# Patient Record
Sex: MALE | Race: BLACK OR AFRICAN AMERICAN | NOT HISPANIC OR LATINO | Employment: UNEMPLOYED | URBAN - METROPOLITAN AREA
[De-identification: names, ages, dates, MRNs, and addresses within clinical notes are randomized per-mention and may not be internally consistent; named-entity substitution may affect disease eponyms.]

---

## 2024-05-11 ENCOUNTER — APPOINTMENT (EMERGENCY)
Dept: CT IMAGING | Facility: HOSPITAL | Age: 49
DRG: 045 | End: 2024-05-11
Payer: COMMERCIAL

## 2024-05-11 ENCOUNTER — APPOINTMENT (EMERGENCY)
Dept: RADIOLOGY | Facility: HOSPITAL | Age: 49
DRG: 045 | End: 2024-05-11
Payer: COMMERCIAL

## 2024-05-11 ENCOUNTER — HOSPITAL ENCOUNTER (INPATIENT)
Facility: HOSPITAL | Age: 49
LOS: 3 days | Discharge: HOME | DRG: 045 | End: 2024-05-14
Attending: EMERGENCY MEDICINE | Admitting: HOSPITALIST
Payer: COMMERCIAL

## 2024-05-11 DIAGNOSIS — I63.9 CEREBROVASCULAR ACCIDENT (CVA), UNSPECIFIED MECHANISM (HCC): Primary | ICD-10-CM

## 2024-05-11 DIAGNOSIS — R29.90 STROKE-LIKE SYMPTOMS: ICD-10-CM

## 2024-05-11 DIAGNOSIS — I63.81 CEREBROVASCULAR ACCIDENT (CVA) OF LEFT BASAL GANGLIA (HCC): ICD-10-CM

## 2024-05-11 PROBLEM — I10 HYPERTENSION: Status: ACTIVE | Noted: 2024-05-11

## 2024-05-11 PROBLEM — E87.6 HYPOKALEMIA: Status: ACTIVE | Noted: 2024-05-11

## 2024-05-11 PROBLEM — K59.00 CONSTIPATION: Status: ACTIVE | Noted: 2024-05-11

## 2024-05-11 LAB
ALBUMIN SERPL BCP-MCNC: 4 G/DL (ref 3.5–5)
ALP SERPL-CCNC: 71 U/L (ref 34–104)
ALT SERPL W P-5'-P-CCNC: 23 U/L (ref 7–52)
ANION GAP SERPL CALCULATED.3IONS-SCNC: 6 MMOL/L (ref 4–13)
AST SERPL W P-5'-P-CCNC: 19 U/L (ref 13–39)
BASOPHILS # BLD AUTO: 0.02 THOUSANDS/ÂΜL (ref 0–0.1)
BASOPHILS NFR BLD AUTO: 0 % (ref 0–1)
BILIRUB SERPL-MCNC: 0.71 MG/DL (ref 0.2–1)
BUN SERPL-MCNC: 15 MG/DL (ref 5–25)
CALCIUM SERPL-MCNC: 8.9 MG/DL (ref 8.4–10.2)
CARDIAC TROPONIN I PNL SERPL HS: <2 NG/L
CHLORIDE SERPL-SCNC: 103 MMOL/L (ref 96–108)
CO2 SERPL-SCNC: 27 MMOL/L (ref 21–32)
CREAT SERPL-MCNC: 0.86 MG/DL (ref 0.6–1.3)
EOSINOPHIL # BLD AUTO: 0.16 THOUSAND/ÂΜL (ref 0–0.61)
EOSINOPHIL NFR BLD AUTO: 3 % (ref 0–6)
ERYTHROCYTE [DISTWIDTH] IN BLOOD BY AUTOMATED COUNT: 12.3 % (ref 11.6–15.1)
GFR SERPL CREATININE-BSD FRML MDRD: 102 ML/MIN/1.73SQ M
GLUCOSE SERPL-MCNC: 99 MG/DL (ref 65–140)
HCT VFR BLD AUTO: 38.9 % (ref 36.5–49.3)
HGB BLD-MCNC: 13.2 G/DL (ref 12–17)
IMM GRANULOCYTES # BLD AUTO: 0.02 THOUSAND/UL (ref 0–0.2)
IMM GRANULOCYTES NFR BLD AUTO: 0 % (ref 0–2)
LIPASE SERPL-CCNC: 28 U/L (ref 11–82)
LYMPHOCYTES # BLD AUTO: 1.57 THOUSANDS/ÂΜL (ref 0.6–4.47)
LYMPHOCYTES NFR BLD AUTO: 25 % (ref 14–44)
MAGNESIUM SERPL-MCNC: 1.9 MG/DL (ref 1.9–2.7)
MCH RBC QN AUTO: 29.7 PG (ref 26.8–34.3)
MCHC RBC AUTO-ENTMCNC: 33.9 G/DL (ref 31.4–37.4)
MCV RBC AUTO: 87 FL (ref 82–98)
MONOCYTES # BLD AUTO: 0.59 THOUSAND/ÂΜL (ref 0.17–1.22)
MONOCYTES NFR BLD AUTO: 9 % (ref 4–12)
NEUTROPHILS # BLD AUTO: 3.95 THOUSANDS/ÂΜL (ref 1.85–7.62)
NEUTS SEG NFR BLD AUTO: 63 % (ref 43–75)
NRBC BLD AUTO-RTO: 0 /100 WBCS
PLATELET # BLD AUTO: 206 THOUSANDS/UL (ref 149–390)
PMV BLD AUTO: 9.2 FL (ref 8.9–12.7)
POTASSIUM SERPL-SCNC: 3.4 MMOL/L (ref 3.5–5.3)
PROT SERPL-MCNC: 6.7 G/DL (ref 6.4–8.4)
RBC # BLD AUTO: 4.45 MILLION/UL (ref 3.88–5.62)
SODIUM SERPL-SCNC: 136 MMOL/L (ref 135–147)
TSH SERPL DL<=0.05 MIU/L-ACNC: 1.4 UIU/ML (ref 0.45–4.5)
WBC # BLD AUTO: 6.31 THOUSAND/UL (ref 4.31–10.16)

## 2024-05-11 PROCEDURE — 36415 COLL VENOUS BLD VENIPUNCTURE: CPT

## 2024-05-11 PROCEDURE — 80053 COMPREHEN METABOLIC PANEL: CPT

## 2024-05-11 PROCEDURE — 83690 ASSAY OF LIPASE: CPT

## 2024-05-11 PROCEDURE — 99222 1ST HOSP IP/OBS MODERATE 55: CPT | Performed by: HOSPITALIST

## 2024-05-11 PROCEDURE — 83735 ASSAY OF MAGNESIUM: CPT

## 2024-05-11 PROCEDURE — 99285 EMERGENCY DEPT VISIT HI MDM: CPT

## 2024-05-11 PROCEDURE — 84484 ASSAY OF TROPONIN QUANT: CPT

## 2024-05-11 PROCEDURE — 99285 EMERGENCY DEPT VISIT HI MDM: CPT | Performed by: EMERGENCY MEDICINE

## 2024-05-11 PROCEDURE — 93005 ELECTROCARDIOGRAM TRACING: CPT

## 2024-05-11 PROCEDURE — 84443 ASSAY THYROID STIM HORMONE: CPT

## 2024-05-11 PROCEDURE — 85025 COMPLETE CBC W/AUTO DIFF WBC: CPT

## 2024-05-11 PROCEDURE — 71045 X-RAY EXAM CHEST 1 VIEW: CPT

## 2024-05-11 PROCEDURE — 70450 CT HEAD/BRAIN W/O DYE: CPT

## 2024-05-11 RX ORDER — ONDANSETRON 2 MG/ML
4 INJECTION INTRAMUSCULAR; INTRAVENOUS EVERY 6 HOURS PRN
Status: DISCONTINUED | OUTPATIENT
Start: 2024-05-11 | End: 2024-05-14 | Stop reason: HOSPADM

## 2024-05-11 RX ORDER — POLYETHYLENE GLYCOL 3350 17 G/17G
17 POWDER, FOR SOLUTION ORAL DAILY PRN
Status: DISCONTINUED | OUTPATIENT
Start: 2024-05-11 | End: 2024-05-14 | Stop reason: HOSPADM

## 2024-05-11 RX ORDER — ENOXAPARIN SODIUM 100 MG/ML
40 INJECTION SUBCUTANEOUS 2 TIMES DAILY
Status: DISCONTINUED | OUTPATIENT
Start: 2024-05-11 | End: 2024-05-11

## 2024-05-11 RX ORDER — ASPIRIN 325 MG
325 TABLET ORAL DAILY
Status: DISCONTINUED | OUTPATIENT
Start: 2024-05-11 | End: 2024-05-11

## 2024-05-11 RX ORDER — HYDRALAZINE HYDROCHLORIDE 20 MG/ML
10 INJECTION INTRAMUSCULAR; INTRAVENOUS EVERY 6 HOURS PRN
Status: DISCONTINUED | OUTPATIENT
Start: 2024-05-11 | End: 2024-05-14 | Stop reason: HOSPADM

## 2024-05-11 RX ORDER — SENNOSIDES 8.6 MG
1 TABLET ORAL DAILY
Status: DISCONTINUED | OUTPATIENT
Start: 2024-05-12 | End: 2024-05-14 | Stop reason: HOSPADM

## 2024-05-11 RX ORDER — ASPIRIN 325 MG
325 TABLET ORAL ONCE
Status: COMPLETED | OUTPATIENT
Start: 2024-05-11 | End: 2024-05-11

## 2024-05-11 RX ORDER — ASPIRIN 81 MG/1
81 TABLET, CHEWABLE ORAL DAILY
Status: DISCONTINUED | OUTPATIENT
Start: 2024-05-12 | End: 2024-05-12

## 2024-05-11 RX ORDER — ACETAMINOPHEN 325 MG/1
650 TABLET ORAL EVERY 6 HOURS PRN
Status: DISCONTINUED | OUTPATIENT
Start: 2024-05-11 | End: 2024-05-14 | Stop reason: HOSPADM

## 2024-05-11 RX ORDER — ATORVASTATIN CALCIUM 40 MG/1
40 TABLET, FILM COATED ORAL EVERY EVENING
Status: DISCONTINUED | OUTPATIENT
Start: 2024-05-11 | End: 2024-05-12

## 2024-05-11 RX ORDER — POTASSIUM CHLORIDE 20 MEQ/1
40 TABLET, EXTENDED RELEASE ORAL ONCE
Status: COMPLETED | OUTPATIENT
Start: 2024-05-11 | End: 2024-05-11

## 2024-05-11 RX ORDER — ENOXAPARIN SODIUM 100 MG/ML
40 INJECTION SUBCUTANEOUS
Status: DISCONTINUED | OUTPATIENT
Start: 2024-05-12 | End: 2024-05-14 | Stop reason: HOSPADM

## 2024-05-11 RX ADMIN — ASPIRIN 325 MG ORAL TABLET 325 MG: 325 PILL ORAL at 19:30

## 2024-05-11 RX ADMIN — POTASSIUM CHLORIDE 40 MEQ: 1500 TABLET, EXTENDED RELEASE ORAL at 19:30

## 2024-05-11 RX ADMIN — ATORVASTATIN CALCIUM 40 MG: 40 TABLET, FILM COATED ORAL at 19:30

## 2024-05-11 NOTE — ASSESSMENT & PLAN NOTE
Presented with 1 week of persistent hiccups.   Expressive aphasia and impaired finger-to-nose noted on admission exam  CT head: Hypodensity in the left basal ganglia concerning for subacute ischemia  No TNK or stroke alert in the ED due to symptoms being present for 1 week  At baseline patient is highly educated (Masters degree) and has no difficulty expressing himself fluently in English  The reflex arc that causes hiccups is thought to involve the basal ganglia - the patient's presenting symptoms are consistent with basal ganglia stroke   TSH within normal limits  Risk factor for CVA: Hypertension  Blood Pressure: 124/80    Plan:  Follow-up MRI brain  Follow up TTE  Monitor on tele  Follow up Lipid Panel and HbA1c  Start Atorvastatin 40 mg daily  Aspirin 325 mg now, followed by Aspirin 81 mg daily  Nursing dysphagia screen prior to starting diet  PT/OT/Speech eval  Neuro checks per protocol  Neurology consulted, appreciate recommendations

## 2024-05-11 NOTE — H&P
[facility]  H&P  Name: Galdino Gaines 48 y.o. male I MRN: [id number]  Unit/Bed#: S MS 311Alex I Date of Admission: 5/11/2024   Date of Service: 5/11/2024 I Hospital Day: 0      Assessment/Plan   * Cerebrovascular accident (CVA) (HCC)  Assessment & Plan  Presented with 1 week of persistent hiccups.   Expressive aphasia and impaired finger-to-nose noted on admission exam  CT head: Hypodensity in the left basal ganglia concerning for subacute ischemia  No TNK or stroke alert in the ED due to symptoms being present for 1 week  At baseline patient is highly educated (Masters degree) and has no difficulty expressing himself fluently in English  The reflex arc that causes hiccups is thought to involve the basal ganglia - the patient's presenting symptoms are consistent with basal ganglia stroke   TSH within normal limits  Risk factor for CVA: Hypertension  Blood Pressure: 139/82    Plan:  Follow-up MRI brain  Follow up TTE  Monitor on tele  Follow up Lipid Panel and HbA1c  Start Atorvastatin 40 mg daily  Aspirin 325 mg now, followed by Aspirin 81 mg daily  Nursing dysphagia screen prior to starting diet  PT/OT/Speech eval  Neuro checks per protocol  Neurology consulted, appreciate recommendations    Hypertension  Assessment & Plan  Blood Pressure: 139/82  Patient reports history of hypertension, is on 1 medication at home but does not recall the name.  He does not recall the name of his primary physician.  He states that it is a private physician, not with [facility] or [facility].  Blood pressure currently well-controlled  The patient's stroke symptoms have been going on for 1 week. Do not need to allow for permissive HTN.     Plan:  Monitor blood pressure.   Avoid hypotension in the setting of CVA  The patient's sister will look up his home med  PRN Hydralazine for SBP >160    Constipation  Assessment & Plan  Patient reports last BM 2-3 days ago    Plan:  Senna 8.6mg daily  Miralax daily  PRN    Hypokalemia  Assessment & Plan  Recent Labs     05/11/24  1402   K 3.4*   MG 1.9       Mild hypokalemia present on admission  Patient endorses poor po intake due to decreased appetite x 1 week    Plan:  Check Mg  Monitor BMP   K/Mg repletion as appropriate         VTE Pharmacologic Prophylaxis: VTE Score: 7 High Risk (Score >/= 5) - Pharmacological DVT Prophylaxis Ordered: enoxaparin (Lovenox). Sequential Compression Devices Ordered.  Code Status: Level 1 - Full Code   Discussion with family: Updated  (sister) via phone.    Anticipated Length of Stay: Patient will be admitted on an inpatient basis with an anticipated length of stay of greater than 2 midnights secondary to CVA.    Chief Complaint: Persistent Hiccups x 1 week    History of Present Illness:  History was obtained from the patient and his sister, Meli (who he lives) with as collateral.     Galdino Gaines is a 48 y.o. male with a PMH of hypertension who presents with persistent hiccups x 1 week.  The patient also notes decreased appetite for the past week.  He has not felt hungry, and has not had a desire to eat.  He denies recent illness, recent travel nausea, vomiting, or abdominal pain.  He has a history of hypertension for which he takes 1 medication.  He does not remember the name of the medication.  He denies noticing any difficulty expressing himself, or word-finding difficulty.     Per the patient's sister Meli, the patient has been having hiccups for the past week. He has also been more quiet than usual this week, and his appetite has been decreased. He has not complained of headaches, vision changes, dizziness, weakness, gait disturbance, chest pain, or palpations. She notes that he had difficulty expressing himself while trying to answer questions in triage today. This is very unusual for him, because he is highly educated, has a Master's in Accounting, and all of his higher education has been in English so he speaks  fluently at baseline. She has not noticed any difficulty with coordination or balance.  The patient does not have any history of blood clots, hypercoagulability, previous strokes, autoimmune disease, or arrhythmias.       Review of Systems:  Review of Systems   Constitutional:  Positive for appetite change (decreased). Negative for chills, fatigue and fever.   HENT:  Negative for rhinorrhea, sneezing and sore throat.    Eyes:  Negative for visual disturbance.   Respiratory:  Negative for cough, choking and shortness of breath.    Cardiovascular:  Negative for chest pain, palpitations and leg swelling.   Gastrointestinal:  Positive for constipation (last BM 2-3 days ago). Negative for abdominal pain, diarrhea, nausea and vomiting.   Genitourinary:  Negative for difficulty urinating, dysuria and frequency.   Musculoskeletal:  Negative for gait problem and neck stiffness.   Skin:  Negative for rash.   Allergic/Immunologic: Negative for food allergies.   Neurological:  Negative for dizziness, syncope, facial asymmetry, speech difficulty, weakness, numbness and headaches.   Psychiatric/Behavioral:  Negative for confusion, decreased concentration and sleep disturbance.    All other systems reviewed and are negative.      Past Medical and Surgical History:   Past Medical History:   Diagnosis Date    Hypertension        No past surgical history on file.    Meds/Allergies:  Prior to Admission medications    Not on File     The patient reports taking 1 medication for hypertension.  Neither the patient nor his Sister Meli recall the name of the medication. The patient's sister will find out the name of the medication.     Supplements:  The patient does not take any supplements or vitamins. No testosterone supplementation.     Allergies: No Known Allergies    Social History:  Marital Status: Single   Occupation: Has a Master's Degree level of education in Accounting   Patient Pre-hospital Living Situation: With other family  "member: Sister, Meli  Patient Pre-hospital Level of Mobility: walks  Patient Pre-hospital Diet Restrictions: None  Substance Use History: Denies tobacco, alcohol, or other substance use   Social History     Substance and Sexual Activity   Alcohol Use Not on file     Social History     Tobacco Use   Smoking Status Not on file   Smokeless Tobacco Not on file     Social History     Substance and Sexual Activity   Drug Use Not on file       Family History:  Patient reports family history of CVA, unsure of age (father)    Physical Exam:     Vitals:   Blood Pressure: 139/82 (05/11/24 1915)  Pulse: 59 (05/11/24 1915)  Temperature: 98.6 °F (37 °C) (05/11/24 1915)  Temp Source: Oral (05/11/24 1731)  Respirations: 18 (05/11/24 1915)  Height: 5' 7\" (170.2 cm) (05/11/24 1731)  Weight - Scale: 73.8 kg (162 lb 11.2 oz) (05/11/24 1813)  SpO2: 99 % (05/11/24 1915)    Physical Exam  Vitals reviewed.   Constitutional:       General: He is not in acute distress.     Appearance: Normal appearance. He is not ill-appearing.   HENT:      Head: Normocephalic and atraumatic.      Right Ear: External ear normal.      Left Ear: External ear normal.      Nose: Nose normal.      Mouth/Throat:      Mouth: Mucous membranes are moist.      Pharynx: Oropharynx is clear. No oropharyngeal exudate.   Eyes:      General: No visual field deficit or scleral icterus.     Extraocular Movements: Extraocular movements intact.      Pupils: Pupils are equal, round, and reactive to light.   Cardiovascular:      Rate and Rhythm: Normal rate and regular rhythm.      Pulses: Normal pulses.      Heart sounds: Normal heart sounds.   Pulmonary:      Effort: Pulmonary effort is normal.      Breath sounds: Normal breath sounds.   Abdominal:      General: Abdomen is flat. Bowel sounds are normal. There is no distension.      Palpations: Abdomen is soft.      Tenderness: There is no abdominal tenderness.   Musculoskeletal:      Cervical back: Neck supple.      Right lower " leg: No edema.      Left lower leg: No edema.   Skin:     General: Skin is warm and dry.   Neurological:      Mental Status: He is alert and oriented to person, place, and time.      Cranial Nerves: Cranial nerves 2-12 are intact. No dysarthria or facial asymmetry.      Sensory: Sensation is intact.      Motor: No weakness, tremor or atrophy.      Coordination: Finger-Nose-Finger Test abnormal (decreased accuracy bilaterally, right worse than left). Heel to Shin Test normal. Impaired rapid alternating movements (slow, bilaterally).      Deep Tendon Reflexes: Reflexes normal. Babinski sign absent on the right side. Babinski sign absent on the left side.      Reflex Scores:       Bicep reflexes are 2+ on the right side.       Brachioradialis reflexes are 2+ on the left side.       Patellar reflexes are 2+ on the right side and 2+ on the left side.     Comments: Uncontrolled hiccupping and yawning throughout exam    Expressive aphasia noted. The patient takes much longer than expected to answer questions, and answers in short, simple sentences. He does not appear to be frustrated with his limited ability to communicate.  Once he does answer, the answers are accurate.  He does not appear to be confused.   Psychiatric:         Mood and Affect: Affect is flat.         Speech: Speech is delayed.         Behavior: Behavior is slowed.        Additional Data:     Lab Results:  Results from last 7 days   Lab Units 05/11/24  1330   WBC Thousand/uL 6.31   HEMOGLOBIN g/dL 13.2   HEMATOCRIT % 38.9   PLATELETS Thousands/uL 206   SEGS PCT % 63   LYMPHO PCT % 25   MONO PCT % 9   EOS PCT % 3     Results from last 7 days   Lab Units 05/11/24  1402   SODIUM mmol/L 136   POTASSIUM mmol/L 3.4*   CHLORIDE mmol/L 103   CO2 mmol/L 27   BUN mg/dL 15   CREATININE mg/dL 0.86   ANION GAP mmol/L 6   CALCIUM mg/dL 8.9   ALBUMIN g/dL 4.0   TOTAL BILIRUBIN mg/dL 0.71   ALK PHOS U/L 71   ALT U/L 23   AST U/L 19   GLUCOSE RANDOM mg/dL 99                        Lines/Drains:  Invasive Devices       Peripheral Intravenous Line  Duration             Peripheral IV 05/11/24 Left Antecubital <1 day                        Imaging: Reviewed radiology reports from this admission including: chest xray and CT head and Personally reviewed the following imaging: chest xray and CT head  CT head without contrast   Final Result by Shravan Dennis MD (05/11 1619)      Hypodensity in the left basal ganglia measuring approximately 3.0 x 1.3 cm, concerning for subacute ischemia. MRI recommended.                  Workstation performed: [id number]         XR chest portable   ED Interpretation by Cruz De León DO (05/11 1317)       No acute pathology      MRI Inpatient Order    (Results Pending)       EKG and Other Studies Reviewed on Admission:   EKG: Sinus Bradycardia. HR 59. No ischemic changes or bundle branch blocks noted.     ** Please Note: This note has been constructed using a voice recognition system. **

## 2024-05-11 NOTE — ASSESSMENT & PLAN NOTE
Blood Pressure: 124/80  Patient reports history of hypertension, is on 1 medication at home but does not recall the name.  He does not recall the name of his primary physician.  He states that it is a private physician[facility]  Blood pressure currently well-controlled  The patient's stroke symptoms have been going on for 1 week. Do not need to allow for permissive HTN.     Plan:  Monitor blood pressure. Blood pressure currently well-controlled.   The patient's sister will look up his home med  PRN Hydralazine for SBP >160

## 2024-05-11 NOTE — ASSESSMENT & PLAN NOTE
Patient reports last BM 2-3 days ago    Plan:  Senna 8.6mg daily  Miralax daily PRN   Pt returned call. Spoke with pt to convey PCP recommendations as below. Pt verbalized understanding and is agreeable to referral.

## 2024-05-11 NOTE — ASSESSMENT & PLAN NOTE
Recent Labs     05/11/24  1402   K 3.4*     Mild hypokalemia present on admission  Patient endorses poor po intake due to decreased appetite x 1 week    Plan:  Check Mg  Monitor BMP   K/Mg repletion as appropriate

## 2024-05-11 NOTE — ASSESSMENT & PLAN NOTE
Blood Pressure: 139/82  Patient reports history of hypertension, is on 1 medication at home but does not recall the name.  He does not recall the name of his primary physician.  He states that it is a private physician, [facility]  Blood pressure currently well-controlled  The patient's stroke symptoms have been going on for 1 week. Do not need to allow for permissive HTN.     Plan:  Monitor blood pressure.   Avoid hypotension in the setting of CVA  The patient's sister will look up his home med  PRN Hydralazine for SBP >160

## 2024-05-11 NOTE — ASSESSMENT & PLAN NOTE
Recent Labs     05/11/24  1402   K 3.4*   MG 1.9       Mild hypokalemia present on admission  Patient endorses poor po intake due to decreased appetite x 1 week    Plan:  Check Mg  Monitor BMP   K/Mg repletion as appropriate

## 2024-05-11 NOTE — ED PROVIDER NOTES
"History  Chief Complaint   Patient presents with    Hiccups     X5 days. No other symptoms. C/o chest pressure    Chest Pain     HPI    Patient is a 47 yo M presenting with hiccups for 5 days - he states in addition to this, he has just felt \"off\". He has difficulty describing his symptoms. In providing his HPI, patient seems to be somewhat difficult to understand and seems to have some aphasia. His family is in the room and notes that they believe his affect has been flat, that he has been acting more subdued than usual.     None       Past Medical History:   Diagnosis Date    Hypertension        No past surgical history on file.    No family history on file.  I have reviewed and agree with the history as documented.    E-Cigarette/Vaping     E-Cigarette/Vaping Substances           Review of Systems   Constitutional:  Positive for fatigue. Negative for chills and fever.   HENT: Negative.     Eyes: Negative.    Respiratory:  Negative for cough and shortness of breath.    Cardiovascular:  Negative for chest pain and palpitations.   Gastrointestinal:  Negative for abdominal pain and vomiting.        Hiccups   Genitourinary:  Negative for dysuria and hematuria.   Musculoskeletal:  Negative for arthralgias and back pain.   Skin:  Negative for color change and rash.   Neurological:  Positive for speech difficulty. Negative for syncope, light-headedness and headaches.   All other systems reviewed and are negative.      Physical Exam  ED Triage Vitals   Temperature Pulse Respirations Blood Pressure SpO2   05/11/24 1252 05/11/24 1235 05/11/24 1235 05/11/24 1235 05/11/24 1235   98.9 °F (37.2 °C) 68 18 144/72 99 %      Temp Source Heart Rate Source Patient Position - Orthostatic VS BP Location FiO2 (%)   05/11/24 1252 05/11/24 1235 05/11/24 1235 05/11/24 1235 --   Oral Monitor Sitting Right arm       Pain Score       --                    Orthostatic Vital Signs  Vitals:    05/11/24 1235 05/11/24 1400 05/11/24 1500   BP: " 144/72 127/75    Pulse: 68 62 59   Patient Position - Orthostatic VS: Sitting Sitting        Physical Exam  Vitals and nursing note reviewed.   Constitutional:       General: He is not in acute distress.  HENT:      Head: Normocephalic and atraumatic.   Eyes:      Conjunctiva/sclera: Conjunctivae normal.   Cardiovascular:      Rate and Rhythm: Normal rate and regular rhythm.      Heart sounds: No murmur heard.  Pulmonary:      Effort: Pulmonary effort is normal. No respiratory distress.      Breath sounds: Normal breath sounds.   Abdominal:      Palpations: Abdomen is soft.      Tenderness: There is no abdominal tenderness.   Musculoskeletal:         General: No swelling.      Cervical back: Neck supple.   Skin:     General: Skin is warm and dry.   Neurological:      Mental Status: He is alert.      Cranial Nerves: No cranial nerve deficit.      Motor: Weakness present.      Coordination: Finger-Nose-Finger Test abnormal.      Comments: Aphasia noted with difficulty speaking. 4/5 muscle strength on R side in upper and lower extremities.         ED Medications  Medications - No data to display    Diagnostic Studies  Results Reviewed       Procedure Component Value Units Date/Time    TSH, 3rd generation with Free T4 reflex [[id number]]  (Normal) Collected: 05/11/24 1402    Lab Status: Final result Specimen: Blood from Arm, Left Updated: 05/11/24 1649     TSH 3RD GENERATON 1.404 uIU/mL     Lipase [[id number]]  (Normal) Collected: 05/11/24 1402    Lab Status: Final result Specimen: Blood from Arm, Left Updated: 05/11/24 1424     Lipase 28 u/L     Comprehensive metabolic panel [[id number]]  (Abnormal) Collected: 05/11/24 1402    Lab Status: Final result Specimen: Blood from Arm, Left Updated: 05/11/24 1424     Sodium 136 mmol/L      Potassium 3.4 mmol/L      Chloride 103 mmol/L      CO2 27 mmol/L      ANION GAP 6 mmol/L      BUN 15 mg/dL      Creatinine 0.86 mg/dL      Glucose 99 mg/dL      Calcium 8.9 mg/dL      AST 19 U/L       ALT 23 U/L      Alkaline Phosphatase 71 U/L      Total Protein 6.7 g/dL      Albumin 4.0 g/dL      Total Bilirubin 0.71 mg/dL      eGFR 102 ml/min/1.73sq m     Narrative:      National Kidney Disease Foundation guidelines for Chronic Kidney Disease (CKD):     Stage 1 with normal or high GFR (GFR > 90 mL/min/1.73 square meters)    Stage 2 Mild CKD (GFR = 60-89 mL/min/1.73 square meters)    Stage 3A Moderate CKD (GFR = 45-59 mL/min/1.73 square meters)    Stage 3B Moderate CKD (GFR = 30-44 mL/min/1.73 square meters)    Stage 4 Severe CKD (GFR = 15-29 mL/min/1.73 square meters)    Stage 5 End Stage CKD (GFR <15 mL/min/1.73 square meters)  Note: GFR calculation is accurate only with a steady state creatinine    HS Troponin 0hr (reflex protocol) [[id number]]  (Normal) Collected: 05/11/24 1331    Lab Status: Final result Specimen: Blood from Arm, Right Updated: 05/11/24 1403     hs TnI 0hr <2 ng/L     CBC and differential [[id number]] Collected: 05/11/24 1330    Lab Status: Final result Specimen: Blood from Arm, Right Updated: 05/11/24 1344     WBC 6.31 Thousand/uL      RBC 4.45 Million/uL      Hemoglobin 13.2 g/dL      Hematocrit 38.9 %      MCV 87 fL      MCH 29.7 pg      MCHC 33.9 g/dL      RDW 12.3 %      MPV 9.2 fL      Platelets 206 Thousands/uL      nRBC 0 /100 WBCs      Segmented % 63 %      Immature Grans % 0 %      Lymphocytes % 25 %      Monocytes % 9 %      Eosinophils Relative 3 %      Basophils Relative 0 %      Absolute Neutrophils 3.95 Thousands/µL      Absolute Immature Grans 0.02 Thousand/uL      Absolute Lymphocytes 1.57 Thousands/µL      Absolute Monocytes 0.59 Thousand/µL      Eosinophils Absolute 0.16 Thousand/µL      Basophils Absolute 0.02 Thousands/µL                    CT head without contrast   Final Result by Shravan Dennis MD (05/11 1619)      Hypodensity in the left basal ganglia measuring approximately 3.0 x 1.3 cm, concerning for subacute ischemia. MRI recommended.                   Workstation performed: [id number]         XR chest portable   ED Interpretation by Cruz De León DO (05/11 1317)       No acute pathology            Procedures  ECG 12 Lead Documentation Only    Date/Time: 5/11/2024 5:09 PM    Performed by: Helene Shannon DO  Authorized by: Helene Shannon DO    Indications / Diagnosis:  Stroke  ECG reviewed by me, the ED Provider: yes    Patient location:  ED  Previous ECG:     Previous ECG:  Unavailable  Interpretation:     Interpretation: abnormal    Rate:     ECG rate:  58  Rhythm:     Rhythm: sinus bradycardia    Ectopy:     Ectopy: none    QRS:     QRS axis:  Normal  Conduction:     Conduction: normal    ST segments:     ST segments:  Normal  T waves:     T waves: inverted      Inverted:  III, aVR, aVL and V1        ED Course                  Stroke Assessment       Row Name 05/11/24 1337             NIH Stroke Scale    Interval Baseline      Level of Consciousness (1a.) 0      LOC Questions (1b.) 0      LOC Commands (1c.) 0      Best Gaze (2.) 0      Visual (3.) 0      Facial Palsy (4.) 1      Motor Arm, Left (5a.) 0      Motor Arm, Right (5b.) 0      Motor Leg, Left (6a.) 0      Motor Leg, Right (6b.) 0      Limb Ataxia (7.) 1      Sensory (8.) 0      Best Language (9.) 1      Dysarthria (10.) 1      Extinction and Inattention (11.) (Formerly Neglect) 0      Total 4                    Flowsheet Row Most Recent Value   Thrombolytic Decision Options    Thrombolytic Decision Patient not a candidate.   Patient is not a candidate options Unclear time of onset outside appropriate time window.                    SBIRT 20yo+      Flowsheet Row Most Recent Value   Initial Alcohol Screen: US AUDIT-C     1. How often do you have a drink containing alcohol? 0 Filed at: 05/11/2024 1237   2. How many drinks containing alcohol do you have on a typical day you are drinking?  0 Filed at: 05/11/2024 1237   3a. Male UNDER 65: How often do you have five or  more drinks on one occasion? 0 Filed at: 05/11/2024 1237   Audit-C Score 0 Filed at: 05/11/2024 1237   REBEKAH: How many times in the past year have you...    Used an illegal drug or used a prescription medication for non-medical reasons? Never Filed at: 05/11/2024 1237                  Medical Decision Making  47 yo M patient presenting with hiccups for 5 days. On initial evaluation, patient had stable vitals, on exam had a flat affect and difficulty speaking. Initially was thought to be secondary to language barrier, when consulting with family it was noted that patient has been speaking english since he was young. Given concern for aphasia, full neuro exam revealed some mild right sided weakness and ataxia with finger-nose finger testing. Given this, CT head was ordered which showed basal ganglia stroke on L side. Otherwise, lab workup was unremarkable and EKG showed no acute changes. It is difficult to say what etiology of hiccups is, could potentially be related to stroke or undiagnosed GI issue. The case was discussed with ZBIGNIEW who ultimately admitted the patient in stable condition for stroke workup.     Problems Addressed:  Cerebrovascular accident (CVA), unspecified mechanism (HCC): acute illness or injury    Amount and/or Complexity of Data Reviewed  Labs: ordered.  Radiology: ordered and independent interpretation performed.    Risk  Decision regarding hospitalization.          Disposition  Final diagnoses:   Cerebrovascular accident (CVA), unspecified mechanism (HCC)     Time reflects when diagnosis was documented in both MDM as applicable and the Disposition within this note       Time User Action Codes Description Comment    5/11/2024  4:38 PM Helene Shannon Add [I63.9] Left basal ganglia embolic stroke (HCC)     5/11/2024  4:38 PM Helene Shannon Remove [I63.9] Left basal ganglia embolic stroke (HCC)     5/11/2024  4:38 PM Helene Shannon Add [I63.9] Cerebrovascular accident (CVA), unspecified mechanism  ([facility])           ED Disposition       ED Disposition   Admit    Condition   Stable    Date/Time   Sat May 11, 2024 3379    Comment   Case was discussed with Dr. Jean and the patient's admission status was agreed to be Admission Status: inpatient status .               Follow-up Information    None         Patient's Medications    No medications on file     No discharge procedures on file.    PDMP Review       None             ED Provider  Attending physically available and evaluated Galdino Gaines. I managed the patient along with the ED Attending.    Electronically Signed by           Helene Shannon DO  05/11/24 2610

## 2024-05-11 NOTE — ASSESSMENT & PLAN NOTE
Presented with 1 week of persistent hiccups.   Expressive aphasia and impaired finger-to-nose noted on admission exam  CT head: Hypodensity in the left basal ganglia concerning for subacute ischemia  No TNK or stroke alert in the ED due to symptoms being present for 1 week  At baseline patient is highly educated (Masters degree) and has no difficulty expressing himself fluently in English  The reflex arc that causes hiccups is thought to involve the basal ganglia - the patient's presenting symptoms are consistent with basal ganglia stroke   TSH within normal limits  Risk factor for CVA: Hypertension, severe hyperlipidemia      Plan:  Follow-up MRI brain  Follow up TTE  Monitor on tele  Follow up HbA1c  Atorvastatin 80 mg daily, aspirin 81 mg daily  PT/OT/Speech eval  Neurology consulted, appreciate recommendations

## 2024-05-11 NOTE — ED ATTENDING ATTESTATION
5/11/2024  ICruz DO, saw and evaluated the patient. I have discussed the patient with the resident/non-physician practitioner and agree with the resident's/non-physician practitioner's findings, Plan of Care, and MDM as documented in the resident's/non-physician practitioner's note, except where noted. All available labs and Radiology studies were reviewed.  I was present for key portions of any procedure(s) performed by the resident/non-physician practitioner and I was immediately available to provide assistance.       At this point I agree with the current assessment done in the Emergency Department.  I have conducted an independent evaluation of this patient a history and physical is as follows:                         48-year-old male presents with multiple symptoms initially his main complaint was intractable hiccups for the past 4 days which actually stopped this morning, patient initially not very forthcoming with this but family states his speech has been off, has had a flat affect, and has been acting  differently.  Patient was not very forthcoming with history and history was limited.         History provided by: brother and sister.  Chest Pain                     Physical Exam  Vitals reviewed.   Constitutional:       Appearance: He is well-developed. He is not diaphoretic.   HENT:      Head: Normocephalic and atraumatic.      Right Ear: External ear normal.      Left Ear: External ear normal.      Nose: Nose normal.   Eyes:      General:         Right eye: No discharge.         Left eye: No discharge.      Pupils: Pupils are equal, round, and reactive to light.   Neck:      Trachea: No tracheal deviation.   Cardiovascular:      Rate and Rhythm: Normal rate and regular rhythm.      Heart sounds: Normal heart sounds. No murmur heard.  Pulmonary:      Effort: Pulmonary effort is normal. No respiratory distress.      Breath sounds: Normal breath sounds. No stridor.   Abdominal:      General:  There is no distension.      Palpations: Abdomen is soft.      Tenderness: There is no abdominal tenderness. There is no guarding or rebound.   Musculoskeletal:         General: Normal range of motion.      Cervical back: Normal range of motion and neck supple.   Skin:     General: Skin is warm and dry.      Coloration: Skin is not pale.      Findings: No erythema.   Neurological:      Mental Status: He is alert and oriented to person, place, and time.      Comments:     Abnormal right hand to finger-to-nose, abnormal right heel-to-shin, ataxia of the right hand, right-sided facial droop, abnormal/atypical speech             Labs Reviewed   COMPREHENSIVE METABOLIC PANEL - Abnormal       Result Value Ref Range Status    Sodium 136  135 - 147 mmol/L Final    Potassium 3.4 (*) 3.5 - 5.3 mmol/L Final    Chloride 103  96 - 108 mmol/L Final    CO2 27  21 - 32 mmol/L Final    ANION GAP 6  4 - 13 mmol/L Final    BUN 15  5 - 25 mg/dL Final    Creatinine 0.86  0.60 - 1.30 mg/dL Final    Comment: Standardized to IDMS reference method    Glucose 99  65 - 140 mg/dL Final    Comment: If the patient is fasting, the ADA then defines impaired fasting glucose as > 100 mg/dL and diabetes as > or equal to 123 mg/dL.    Calcium 8.9  8.4 - 10.2 mg/dL Final    AST 19  13 - 39 U/L Final    ALT 23  7 - 52 U/L Final    Comment: Specimen collection should occur prior to Sulfasalazine administration due to the potential for falsely depressed results.     Alkaline Phosphatase 71  34 - 104 U/L Final    Total Protein 6.7  6.4 - 8.4 g/dL Final    Albumin 4.0  3.5 - 5.0 g/dL Final    Total Bilirubin 0.71  0.20 - 1.00 mg/dL Final    Comment: Use of this assay is not recommended for patients undergoing treatment with eltrombopag due to the potential for falsely elevated results.  N-acetyl-p-benzoquinone imine (metabolite of Acetaminophen) will generate erroneously low results in samples for patients that have taken an overdose of Acetaminophen.     "eGFR 102  ml/min/1.73sq m Final    Narrative:     National Kidney Disease Foundation guidelines for Chronic Kidney Disease (CKD):     Stage 1 with normal or high GFR (GFR > 90 mL/min/1.73 square meters)    Stage 2 Mild CKD (GFR = 60-89 mL/min/1.73 square meters)    Stage 3A Moderate CKD (GFR = 45-59 mL/min/1.73 square meters)    Stage 3B Moderate CKD (GFR = 30-44 mL/min/1.73 square meters)    Stage 4 Severe CKD (GFR = 15-29 mL/min/1.73 square meters)    Stage 5 End Stage CKD (GFR <15 mL/min/1.73 square meters)  Note: GFR calculation is accurate only with a steady state creatinine   LIPASE - Normal    Lipase 28  11 - 82 u/L Final   HS TROPONIN I 0HR - Normal    hs TnI 0hr <2  \"Refer to ACS Flowchart\"- see link ng/L Final    Comment:                                              Initial (time 0) result  If >=50 ng/L, Myocardial injury suggested ;  Type of myocardial injury and treatment strategy  to be determined.  If 5-49 ng/L, a delta result at 2 hours and or 4 hours will be needed to further evaluate.  If <4 ng/L, and chest pain has been >3 hours since onset, patient may qualify for discharge based on the HEART score in the ED.  If <5 ng/L and <3hours since onset of chest pain, a delta result at 2 hours will be needed to further evaluate.    HS Troponin 99th Percentile URL of a Health Population=12 ng/L with a 95% Confidence Interval of 8-18 ng/L.    Second Troponin (time 2 hours)  If calculated delta >= 20 ng/L,  Myocardial injury suggested ; Type of myocardial injury and treatment strategy to be determined.  If 5-49 ng/L and the calculated delta is 5-19 ng/L, consult medical service for evaluation.  Continue evaluation for ischemia on ecg and other possible etiology and repeat hs troponin at 4 hours.  If delta is <5 ng/L at 2 hours, consider discharge based on risk stratification via the HEART score (if in ED), or JAVIER risk score in IP/Observation.    HS Troponin 99th Percentile URL of a Health Population=12 ng/L " with a 95% Confidence Interval of 8-18 ng/L.   CBC AND DIFFERENTIAL    WBC 6.31  4.31 - 10.16 Thousand/uL Final    RBC 4.45  3.88 - 5.62 Million/uL Final    Hemoglobin 13.2  12.0 - 17.0 g/dL Final    Hematocrit 38.9  36.5 - 49.3 % Final    MCV 87  82 - 98 fL Final    MCH 29.7  26.8 - 34.3 pg Final    MCHC 33.9  31.4 - 37.4 g/dL Final    RDW 12.3  11.6 - 15.1 % Final    MPV 9.2  8.9 - 12.7 fL Final    Platelets 206  149 - 390 Thousands/uL Final    nRBC 0  /100 WBCs Final    Segmented % 63  43 - 75 % Final    Immature Grans % 0  0 - 2 % Final    Lymphocytes % 25  14 - 44 % Final    Monocytes % 9  4 - 12 % Final    Eosinophils Relative 3  0 - 6 % Final    Basophils Relative 0  0 - 1 % Final    Absolute Neutrophils 3.95  1.85 - 7.62 Thousands/µL Final    Absolute Immature Grans 0.02  0.00 - 0.20 Thousand/uL Final    Absolute Lymphocytes 1.57  0.60 - 4.47 Thousands/µL Final    Absolute Monocytes 0.59  0.17 - 1.22 Thousand/µL Final    Eosinophils Absolute 0.16  0.00 - 0.61 Thousand/µL Final    Basophils Absolute 0.02  0.00 - 0.10 Thousands/µL Final   TSH, 3RD GENERATION WITH FREE T4 REFLEX         CT head without contrast   Final Result      Hypodensity in the left basal ganglia measuring approximately 3.0 x 1.3 cm, concerning for subacute ischemia. MRI recommended.                  Workstation performed: [id number]         XR chest portable   ED Interpretation       No acute pathology                   Procedures                            MDM  Number of Diagnoses or Management Options  Cerebrovascular accident (CVA), unspecified mechanism (HCC)  Diagnosis management comments:       Initial ED assessment:   48-year-old male, brought in for hiccups but found to have a right-sided facial droop, right arm ataxia abnormal finger-to-nose    Initial DDx includes but is not limited to:   CVA, intracranial neoplasm, intracranial hemorrhage    Initial ED plan:   Blood work, CT head, admission    No stroke alert called/no  thrombolytics given as patient is out of the safe therapeutic window    Final ED summary/disposition:   After evaluation and workup in the emergency department, subacute CVA, admitted to hospital for further workup evaluation               Time reflects when diagnosis was documented in both MDM as applicable and the Disposition within this note       Time User Action Codes Description Comment    5/11/2024  4:38 PM Helene Shannon Add [I63.9] Left basal ganglia embolic stroke (HCC)     5/11/2024  4:38 PM Helene Shannon Remove [I63.9] Left basal ganglia embolic stroke (HCC)     5/11/2024  4:38 PM Helene Shannon Add [I63.9] Cerebrovascular accident (CVA), unspecified mechanism (HCC)           ED Disposition       ED Disposition   Admit    Condition   Stable    Date/Time   Sat May 11, 2024  4:37 PM    Comment   Case was discussed with Dr. Jean and the patient's admission status was agreed to be Admission Status: inpatient status .               Follow-up Information    None

## 2024-05-12 ENCOUNTER — APPOINTMENT (INPATIENT)
Dept: MRI IMAGING | Facility: HOSPITAL | Age: 49
DRG: 045 | End: 2024-05-12
Payer: COMMERCIAL

## 2024-05-12 ENCOUNTER — APPOINTMENT (INPATIENT)
Dept: CT IMAGING | Facility: HOSPITAL | Age: 49
DRG: 045 | End: 2024-05-12
Payer: COMMERCIAL

## 2024-05-12 ENCOUNTER — APPOINTMENT (INPATIENT)
Dept: NON INVASIVE DIAGNOSTICS | Facility: HOSPITAL | Age: 49
DRG: 045 | End: 2024-05-12
Payer: COMMERCIAL

## 2024-05-12 PROBLEM — I63.81 CEREBROVASCULAR ACCIDENT (CVA) OF LEFT BASAL GANGLIA (HCC): Status: ACTIVE | Noted: 2024-05-11

## 2024-05-12 PROBLEM — R06.6 INTRACTABLE HICCUPS: Status: ACTIVE | Noted: 2024-05-12

## 2024-05-12 PROBLEM — E87.6 HYPOKALEMIA: Status: RESOLVED | Noted: 2024-05-11 | Resolved: 2024-05-12

## 2024-05-12 LAB
ANION GAP SERPL CALCULATED.3IONS-SCNC: 7 MMOL/L (ref 4–13)
AORTIC ROOT: 2.8 CM
APICAL FOUR CHAMBER EJECTION FRACTION: 72 %
ATRIAL RATE: 59 BPM
BASOPHILS # BLD AUTO: 0.03 THOUSANDS/ÂΜL (ref 0–0.1)
BASOPHILS NFR BLD AUTO: 0 % (ref 0–1)
BSA FOR ECHO PROCEDURE: 1.86 M2
BUN SERPL-MCNC: 12 MG/DL (ref 5–25)
CALCIUM SERPL-MCNC: 9.3 MG/DL (ref 8.4–10.2)
CHLORIDE SERPL-SCNC: 103 MMOL/L (ref 96–108)
CHOLEST SERPL-MCNC: 282 MG/DL
CO2 SERPL-SCNC: 27 MMOL/L (ref 21–32)
CREAT SERPL-MCNC: 0.96 MG/DL (ref 0.6–1.3)
E WAVE DECELERATION TIME: 341 MS
E/A RATIO: 0.59
EOSINOPHIL # BLD AUTO: 0.14 THOUSAND/ÂΜL (ref 0–0.61)
EOSINOPHIL NFR BLD AUTO: 2 % (ref 0–6)
ERYTHROCYTE [DISTWIDTH] IN BLOOD BY AUTOMATED COUNT: 12.5 % (ref 11.6–15.1)
FRACTIONAL SHORTENING: 47 (ref 28–44)
GFR SERPL CREATININE-BSD FRML MDRD: 93 ML/MIN/1.73SQ M
GLUCOSE SERPL-MCNC: 92 MG/DL (ref 65–140)
HCT VFR BLD AUTO: 43.3 % (ref 36.5–49.3)
HDLC SERPL-MCNC: 59 MG/DL
HGB BLD-MCNC: 14.5 G/DL (ref 12–17)
IMM GRANULOCYTES # BLD AUTO: 0.03 THOUSAND/UL (ref 0–0.2)
IMM GRANULOCYTES NFR BLD AUTO: 0 % (ref 0–2)
INTERVENTRICULAR SEPTUM IN DIASTOLE (PARASTERNAL SHORT AXIS VIEW): 1 CM
INTERVENTRICULAR SEPTUM: 1 CM (ref 0.6–1.1)
LAAS-AP2: 14 CM2
LAAS-AP4: 15.2 CM2
LDLC SERPL CALC-MCNC: 205 MG/DL (ref 0–100)
LEFT ATRIUM SIZE: 2.7 CM
LEFT ATRIUM VOLUME (MOD BIPLANE): 37 ML
LEFT ATRIUM VOLUME INDEX (MOD BIPLANE): 19.9 ML/M2
LEFT INTERNAL DIMENSION IN SYSTOLE: 2.3 CM (ref 2.1–4)
LEFT VENTRICULAR INTERNAL DIMENSION IN DIASTOLE: 4.3 CM (ref 3.5–6)
LEFT VENTRICULAR POSTERIOR WALL IN END DIASTOLE: 0.8 CM
LEFT VENTRICULAR STROKE VOLUME: 65 ML
LVSV (TEICH): 65 ML
LYMPHOCYTES # BLD AUTO: 1.51 THOUSANDS/ÂΜL (ref 0.6–4.47)
LYMPHOCYTES NFR BLD AUTO: 20 % (ref 14–44)
MCH RBC QN AUTO: 29.3 PG (ref 26.8–34.3)
MCHC RBC AUTO-ENTMCNC: 33.5 G/DL (ref 31.4–37.4)
MCV RBC AUTO: 88 FL (ref 82–98)
MONOCYTES # BLD AUTO: 0.57 THOUSAND/ÂΜL (ref 0.17–1.22)
MONOCYTES NFR BLD AUTO: 8 % (ref 4–12)
MV E'TISSUE VEL-SEP: 6 CM/S
MV PEAK A VEL: 0.75 M/S
MV PEAK E VEL: 44 CM/S
MV STENOSIS PRESSURE HALF TIME: 99 MS
MV VALVE AREA P 1/2 METHOD: 2.22
NEUTROPHILS # BLD AUTO: 5.23 THOUSANDS/ÂΜL (ref 1.85–7.62)
NEUTS SEG NFR BLD AUTO: 70 % (ref 43–75)
NRBC BLD AUTO-RTO: 0 /100 WBCS
P AXIS: 46 DEGREES
PLATELET # BLD AUTO: 223 THOUSANDS/UL (ref 149–390)
PMV BLD AUTO: 9.4 FL (ref 8.9–12.7)
POTASSIUM SERPL-SCNC: 4.3 MMOL/L (ref 3.5–5.3)
PR INTERVAL: 158 MS
QRS AXIS: 88 DEGREES
QRSD INTERVAL: 124 MS
QT INTERVAL: 404 MS
QTC INTERVAL: 399 MS
RBC # BLD AUTO: 4.95 MILLION/UL (ref 3.88–5.62)
RIGHT ATRIAL 2D VOLUME: 25 ML
RIGHT ATRIUM AREA SYSTOLE A4C: 11.9 CM2
RIGHT VENTRICLE ID DIMENSION: 3 CM
SL CV LEFT ATRIUM LENGTH A2C: 4.8 CM
SL CV LV EF: 70
SL CV PED ECHO LEFT VENTRICLE DIASTOLIC VOLUME (MOD BIPLANE) 2D: 83 ML
SL CV PED ECHO LEFT VENTRICLE SYSTOLIC VOLUME (MOD BIPLANE) 2D: 18 ML
SODIUM SERPL-SCNC: 137 MMOL/L (ref 135–147)
T WAVE AXIS: -2 DEGREES
TRICUSPID ANNULAR PLANE SYSTOLIC EXCURSION: 2.4 CM
TRIGL SERPL-MCNC: 88 MG/DL
VENTRICULAR RATE: 59 BPM
WBC # BLD AUTO: 7.51 THOUSAND/UL (ref 4.31–10.16)

## 2024-05-12 PROCEDURE — 85306 CLOT INHIBIT PROT S FREE: CPT | Performed by: NURSE PRACTITIONER

## 2024-05-12 PROCEDURE — 86147 CARDIOLIPIN ANTIBODY EA IG: CPT | Performed by: NURSE PRACTITIONER

## 2024-05-12 PROCEDURE — 70496 CT ANGIOGRAPHY HEAD: CPT

## 2024-05-12 PROCEDURE — 86146 BETA-2 GLYCOPROTEIN ANTIBODY: CPT | Performed by: NURSE PRACTITIONER

## 2024-05-12 PROCEDURE — 85613 RUSSELL VIPER VENOM DILUTED: CPT | Performed by: NURSE PRACTITIONER

## 2024-05-12 PROCEDURE — 80048 BASIC METABOLIC PNL TOTAL CA: CPT

## 2024-05-12 PROCEDURE — 85670 THROMBIN TIME PLASMA: CPT | Performed by: NURSE PRACTITIONER

## 2024-05-12 PROCEDURE — 97163 PT EVAL HIGH COMPLEX 45 MIN: CPT

## 2024-05-12 PROCEDURE — 85025 COMPLETE CBC W/AUTO DIFF WBC: CPT

## 2024-05-12 PROCEDURE — 85300 ANTITHROMBIN III ACTIVITY: CPT | Performed by: NURSE PRACTITIONER

## 2024-05-12 PROCEDURE — 93010 ELECTROCARDIOGRAM REPORT: CPT | Performed by: INTERNAL MEDICINE

## 2024-05-12 PROCEDURE — 99232 SBSQ HOSP IP/OBS MODERATE 35: CPT | Performed by: INTERNAL MEDICINE

## 2024-05-12 PROCEDURE — 93306 TTE W/DOPPLER COMPLETE: CPT

## 2024-05-12 PROCEDURE — 80061 LIPID PANEL: CPT

## 2024-05-12 PROCEDURE — 85305 CLOT INHIBIT PROT S TOTAL: CPT | Performed by: NURSE PRACTITIONER

## 2024-05-12 PROCEDURE — 99255 IP/OBS CONSLTJ NEW/EST HI 80: CPT | Performed by: STUDENT IN AN ORGANIZED HEALTH CARE EDUCATION/TRAINING PROGRAM

## 2024-05-12 PROCEDURE — 97166 OT EVAL MOD COMPLEX 45 MIN: CPT

## 2024-05-12 PROCEDURE — 70551 MRI BRAIN STEM W/O DYE: CPT

## 2024-05-12 PROCEDURE — 85732 THROMBOPLASTIN TIME PARTIAL: CPT | Performed by: NURSE PRACTITIONER

## 2024-05-12 PROCEDURE — 70498 CT ANGIOGRAPHY NECK: CPT

## 2024-05-12 PROCEDURE — 83036 HEMOGLOBIN GLYCOSYLATED A1C: CPT

## 2024-05-12 PROCEDURE — 93306 TTE W/DOPPLER COMPLETE: CPT | Performed by: INTERNAL MEDICINE

## 2024-05-12 PROCEDURE — 85705 THROMBOPLASTIN INHIBITION: CPT | Performed by: NURSE PRACTITIONER

## 2024-05-12 PROCEDURE — 85303 CLOT INHIBIT PROT C ACTIVITY: CPT | Performed by: NURSE PRACTITIONER

## 2024-05-12 RX ORDER — CHLORPROMAZINE HYDROCHLORIDE 10 MG/1
10 TABLET, FILM COATED ORAL EVERY 6 HOURS PRN
Status: DISCONTINUED | OUTPATIENT
Start: 2024-05-12 | End: 2024-05-12

## 2024-05-12 RX ORDER — METOCLOPRAMIDE HYDROCHLORIDE 5 MG/ML
10 INJECTION INTRAMUSCULAR; INTRAVENOUS EVERY 6 HOURS PRN
Status: DISCONTINUED | OUTPATIENT
Start: 2024-05-12 | End: 2024-05-12

## 2024-05-12 RX ORDER — CHLORPROMAZINE HYDROCHLORIDE 25 MG/1
25 TABLET, FILM COATED ORAL EVERY 6 HOURS
Status: DISCONTINUED | OUTPATIENT
Start: 2024-05-12 | End: 2024-05-13

## 2024-05-12 RX ORDER — CHLORPROMAZINE HYDROCHLORIDE 10 MG/1
10 TABLET, FILM COATED ORAL EVERY 6 HOURS
Status: DISCONTINUED | OUTPATIENT
Start: 2024-05-12 | End: 2024-05-12

## 2024-05-12 RX ORDER — CHLORPROMAZINE HYDROCHLORIDE 25 MG/1
25 TABLET, FILM COATED ORAL EVERY 6 HOURS
Status: DISCONTINUED | OUTPATIENT
Start: 2024-05-12 | End: 2024-05-12

## 2024-05-12 RX ORDER — ATORVASTATIN CALCIUM 40 MG/1
80 TABLET, FILM COATED ORAL EVERY EVENING
Status: DISCONTINUED | OUTPATIENT
Start: 2024-05-12 | End: 2024-05-14 | Stop reason: HOSPADM

## 2024-05-12 RX ADMIN — CHLORPROMAZINE HYDROCHLORIDE 25 MG: 25 TABLET, FILM COATED ORAL at 23:42

## 2024-05-12 RX ADMIN — IOHEXOL 85 ML: 350 INJECTION, SOLUTION INTRAVENOUS at 21:49

## 2024-05-12 RX ADMIN — CHLORPROMAZINE HYDROCHLORIDE 10 MG: 10 TABLET, FILM COATED ORAL at 09:07

## 2024-05-12 RX ADMIN — ENOXAPARIN SODIUM 40 MG: 40 INJECTION SUBCUTANEOUS at 08:59

## 2024-05-12 RX ADMIN — SENNOSIDES 8.6 MG: 8.6 TABLET, FILM COATED ORAL at 08:59

## 2024-05-12 RX ADMIN — CHLORPROMAZINE HYDROCHLORIDE 25 MG: 25 TABLET, FILM COATED ORAL at 17:30

## 2024-05-12 RX ADMIN — ONDANSETRON 4 MG: 2 INJECTION INTRAMUSCULAR; INTRAVENOUS at 09:00

## 2024-05-12 RX ADMIN — CHLORPROMAZINE HYDROCHLORIDE 50 MG: 25 TABLET, FILM COATED ORAL at 11:50

## 2024-05-12 RX ADMIN — ATORVASTATIN CALCIUM 80 MG: 40 TABLET, FILM COATED ORAL at 17:30

## 2024-05-12 RX ADMIN — ASPIRIN 81 MG CHEWABLE TABLET 81 MG: 81 TABLET CHEWABLE at 08:59

## 2024-05-12 NOTE — ASSESSMENT & PLAN NOTE
Neurology is asked to see this 48-year-old right-hand-dominant gentleman who as noted elsewhere presents with a 1 week history of reportedly acute onset hiccups and by the patient's report also difficulty with language.  The patient reports only a history of hypertension.  The patient reports that it was about a week ago that when he started with the hiccups he also noted that he was having trouble getting his words out.  He presented yesterday to our facility for a workup of the hiccups and his family thought that he was not acting his usual self.  Screening head CT done noted what appears to be a subacute infarct in the left basal ganglia region.  Patient is now admitted for the rest of the workup.  At the time of this exam his CTA head and neck, MRI brain with and without, as well as his echo are all pending.     -  Stroke protocol as he is currently on  -  MRI of brain with out contrast - likely to be done this evening or tomorrow a.m.  -  Echocardiogram -  -  maintain 1 40 if possible.  If he becomes hypotensive decrease head of the bed maintain perfusion pressure with the fluids.  -  Telemetry, continue no AFib seen so far,   -  Continue ... Antipltl aspirin 81.  He has had 325 aspirin bolus.  -  Lipid panel - Statin medication started and to be maintained post discharge  -  A1C & other labs  -  Keep euvolemic, as dehydration can worsen exam and function  -  Treat high blood sugars if arises  -  Therapies to include PT/OT/ST  -  DVT prevention   -  Secondary stroke / TIA  risk factor modification  -  Frequent neurological check and notify neurology with any change in neuro status  -  Continue to monitor for infectious and metabolic derangements and treat as arises

## 2024-05-12 NOTE — OCCUPATIONAL THERAPY NOTE
Occupational Therapy Evaluation     Patient Name: Galdino Gaines  Today's Date: 5/12/2024  Problem List  Principal Problem:    Sub Acute  Cerebrovascular accident (CVA) of left basal ganglia (HCC)  Active Problems:    Hypertension    Constipation    Intractable hiccups    Past Medical History  Past Medical History:   Diagnosis Date    Hypertension       05/12/24 1111   Note Type   Note type Evaluation   Pain Assessment   Pain Assessment Tool 0-10   Pain Score No Pain   Restrictions/Precautions   Weight Bearing Precautions Per Order No   Other Precautions (S)  Chair Alarm;Bed Alarm;Cognitive   Home Living   Type of Home House  (0 JESSIE)   Home Layout One level   Bathroom Shower/Tub Walk-in shower   Additional Comments Patient is questionable on history.Patient states lives with sister and her family. He came 2 months ago from  [place] . His sister home his bed and bath is on the main level   Prior Function   Level of [place] Independent with ADLs;Independent with functional mobility   Lives With Family   Vocational Full time employment  (Computer science)   ADL   Eating Assistance 7  Independent   Grooming Assistance 5  Supervision/Setup   UB Bathing Assistance 5  Supervision/Setup   UB Bathing Deficit Right arm;Left arm   UB Dressing Assistance 5  Supervision/Setup   LB Dressing Assistance 5  Supervision/Setup   LB Dressing Deficit Thread RLE into pants;Thread LLE into pants;Don/doff R sock;Don/doff L sock   Toileting Assistance  5  Supervision/Setup   Bed Mobility   Supine to Sit 6  Modified independent   Additional items Increased time required   Sit to Supine 6  Modified independent   Additional items Assist x 1;Increased time required   Transfers   Sit to Stand 6  Modified independent   Additional items Increased time required   Stand to Sit 6  Modified independent   Additional items Increased time required   Functional Mobility   Additional Comments Patient ambuted with out AD at Sup. Patient had  unsteady gait noted when making turns   Balance   Static Sitting Fair +   Dynamic Sitting Fair +   Static Standing Fair -   Dynamic Standing Fair -   Activity Tolerance   Activity Tolerance Patient tolerated treatment well   Nurse Made Aware RN   RUE Assessment   RUE Assessment WFL   LUE Assessment   LUE Assessment WFL   Hand Function   Gross Motor Coordination Functional   Fine Motor Coordination Functional   Vision-Basic Assessment   Current Vision Wears glasses all the time   Vision - Complex Assessment   Acuity Able to read clock/calendar on wall without difficulty   Cognition   Overall Cognitive Status Impaired   Arousal/Participation Alert   Attention Difficulty attending to directions   Orientation Level Oriented to person;Disoriented to place;Disoriented to time;Oriented to place   Following Commands Follows one step commands with increased time or repetition   Comments (S)  Patient has difficulty following commands requires increase time fro processing information.   Assessment   Limitation Decreased ADL status;Decreased self-care trans;Decreased high-level ADLs   Prognosis Fair   Assessment Patient evaluated by Occupational Therapy.  Patient admitted with Cerebrovascular accident (CVA) of left basal ganglia (HCC).  The patients occupational profile, medical and therapy history includes a expanded review of medical and/or therapy records and additional review of physical, cognitive, or psychosocial history related to current functional performance.  Comorbidities affecting functional mobility and ADLS include: CVA and hypertension.  Prior to admission, patient was independent with functional mobility without assistive device, independent with ADLS, independent with IADLS, and living with sister and gregory family in a 2 level home with 0 steps to enter. See above for performance levels. The evaluation identifies the following performance deficits: impaired balance, decreased endurance, decreased coordination,  increased fall risk, new onset of impairment of functional mobility, decreased ADLS, decreased activity tolerance, and decreased safety awareness, that result in activity limitations and/or participation restrictions. This evaluation requires clinical decision making of moderate complexity, because the patient may present with comorbidities that affect occupational performance and required minimal or moderate modification of tasks or assistance with the consideration of several treatment options.  The Barthel Index was used as a functional outcome tool presenting with a score of Barthel Index Score: 90, .  The patient's raw score on the -PAC Daily Activity Inpatient Short Form is 20. A raw score of less than 19 suggests the patient may benefit from discharge to home.  Please refer to the recommendation of the Occupational Therapist for safe discharge planning. Patient will benefit from skilled Occupational Therapy services to address above deficits and facilitate a safe return to prior level of function.   Goals   Patient Goals unable to express at this time   LTG Time Frame   (8-14)   Long Term Goal #1 See below   Plan   Treatment Interventions ADL retraining;Endurance training;Patient/family training;Neuromuscular reeducation;Compensatory technique education;Continued evaluation;Activityengagement   Goal Expiration Date 05/26/24   OT Frequency 1-2x/wk   Discharge Recommendation   Rehab Resource Intensity Level, OT III (Minimum Resource Intensity)   AM-PAC Daily Activity Inpatient   Lower Body Dressing 3   Bathing 3   Toileting 3   Upper Body Dressing 4   Grooming 3   Eating 4   Daily Activity Raw Score 20   Daily Activity Standardized Score (Calc for Raw Score >=11) 42.03   AM-[facility] Applied Cognition Inpatient   Following a Speech/Presentation 4   Understanding Ordinary Conversation 4   Taking Medications 3   Remembering Where Things Are Placed or Put Away 3   Remembering List of 4-5 Errands 3   Taking Care of  Complicated Tasks 3   Applied Cognition Raw Score 20   Applied Cognition Standardized Score 41.76   Barthel Index   Feeding 10   Bathing 5   Grooming Score 5   Dressing Score 10   Bladder Score 10   Bowels Score 10   Toilet Use Score 10   Transfers (Bed/Chair) Score 10   Mobility (Level Surface) Score 10   Stairs Score 10   Barthel Index Score 90   Modified Hye Scale   Modified Hye Scale 2   End of Consult   Patient Position at End of Consult Supine;Bed/Chair alarm activated;All needs within reach   Nurse Communication Nurse aware of consult   GOALS    1) Pt will improve activity tolerance to G for min 30 min txment sessions for increase engagement in functional tasks    2) Pt will complete UB/LB dressing/self care w/ mod I using adaptive device and DME as needed    3) Pt will complete bathing Independent  w/ use of AE and DME as needed    4) Pt will complete toileting w/ Independent w/ G hygiene/thoroughness using DME as needed    5) Pt will improve functional transfers to Independent on/off all surfaces using DME as needed w/ G balance/safety     6) Pt will improve functional mobility during ADL/IADL/leisure tasks to Independent using DME as needed w/ G balance/safety

## 2024-05-12 NOTE — CONSULTS
[facility]  Neurology consult - Name: Galdino Gaines 48 y.o. male   I MRN: [id number] Unit/Bed#: S -01 I   Date of Admission: 5/11/2024  Date of Service: 5/12/2024 I Hospital Day: 1    Inpatient consult to Neurology  Consult performed by: EVE Miller  Consult ordered by: Shana Weinstein MD    Reason for Consult / Principal Problem: Stroke found on CAT scan  Hx and PE limited by: None, although the patient's language right now related to his stroke is limiting his review of systems  Review of previous medical records was completed.   Family, was not present at the bedside for history and examination    Assessment/Plan   * Sub Acute  Cerebrovascular accident (CVA) of left basal ganglia (HCC)  Assessment & Plan  Neurology is asked to see this 48-year-old right-hand-dominant gentleman who as noted elsewhere presents with a 1 week history of reportedly acute onset hiccups and by the patient's report also difficulty with language.  The patient reports only a history of hypertension.  The patient reports that it was about a week ago that when he started with the hiccups he also noted that he was having trouble getting his words out.  He presented yesterday to our facility for a workup of the hiccups and his family thought that he was not acting his usual self.  Screening head CT done noted what appears to be a subacute infarct in the left basal ganglia region.  Patient is now admitted for the rest of the workup.  At the time of this exam his CTA head and neck, MRI brain with and without, as well as his echo are all pending.     -  Stroke protocol as he is currently on  -  MRI of brain with out contrast - likely to be done this evening or tomorrow a.m.  -  Echocardiogram -  -  maintain 1 40 if possible.  If he becomes hypotensive decrease head of the bed maintain perfusion pressure with the fluids.  -  Telemetry, continue no AFib seen so far,   -  Continue ... Antipltl aspirin 81.   He has had 325 aspirin bolus.  -  Lipid panel - Statin medication started and to be maintained post discharge  -  A1C & other labs  -  Keep euvolemic, as dehydration can worsen exam and function  -  Treat high blood sugars if arises  -  Therapies to include PT/OT/ST  -  DVT prevention   -  Secondary stroke / TIA  risk factor modification  -  Frequent neurological check and notify neurology with any change in neuro status  -  Continue to monitor for infectious and metabolic derangements and treat as arises         This patient will need to see neurology as an outpatient nonurgently hopefully within 1 to 2 months.  His meds are yet to be determined given the rest of his workup is pending.  He should also be following up with his PCP within 1 to 2 weeks if possible.      HPI: Galdino Gaines is a right handed  48 y.o. male who neurology is asked to see after he presented to our ED for 5 days his hiccups.  As noted above routine CT head scan screening appreciated what appears to be a subacute left basal ganglia infarct.  Patient is admitted for workup.  He has a history of hypertension and he reports to this examiner he is medication compliant.  He denies that he had any prodromal that he is unable to recall or report, he does have language problems currently see below, at this time..      ROS: 12 system cued query: He has been medicated for the headaches and this feels improved.  He has no headache at this time.  He has no numbness or tingling, no chest pain palpitations or shortness of breath.  The remainder of his queries negative.      Historical Information     Past Medical History:   Diagnosis Date    Hypertension      History reviewed. No pertinent surgical history.    Social History : Patient is single.  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  He is a non-smoker no alcohol no recreational drugs.        Family History:  "History reviewed. No pertinent family history.      No Known Allergies  Meds:all current active meds have been reviewed.  He is aspirin naïve and compliant with his meds.    Scheduled Meds:  Current Facility-Administered Medications   Medication Dose Route Frequency    acetaminophen  650 mg Oral Q6H PRN    aspirin  81 mg Oral Daily    atorvastatin  80 mg Oral QPM    chlorproMAZINE  25 mg Oral Q6H    enoxaparin  40 mg Subcutaneous Q24H WILLIAM    hydrALAZINE  10 mg Intravenous Q6H PRN    ondansetron  4 mg Intravenous Q6H PRN    polyethylene glycol  17 g Oral Daily PRN    senna  1 tablet Oral Daily     PRN Meds:.  acetaminophen    hydrALAZINE    ondansetron    polyethylene glycol      Physical Exam:   Objective   Vitals:Blood pressure 152/85, pulse 66, temperature 99.3 °F (37.4 °C), resp. rate 20, height 5' 7\" (1.702 m), weight 74.7 kg (164 lb 10.9 oz), SpO2 99%.,Body mass index is 25.79 kg/m².      Patient was examined in bed there is no family present.  General: alert, appears stated age and cooperative  Head: Normocephalic, without obvious abnormality, atraumatic  Oral exam: lips, mucosa, and tongue moist;   Neck: no carotid bruit,   Lungs: clear to auscultation ant. bilaterally  Heart: regular rate and rhythm, S1, S2 normal, no murmur appreciated,   Abdomen: soft, +BS    Extremities: atraumatic, no cyanosis or edema    Neurologic:   Mental status: Alert, he appears quiet and subdued.  Oriented to name he was able to engage in the LES.  He consistently reported the date incorrectly, 12/14/2004, despite attempts to recall it correctly.  He was able to write the date correctly though.  He was able to read numbers easier than he was able to verbally answer questions.  He had some difficulty reading the word list.  He was able to more spontaneously describe the cookie jar picture and he had difficulty with the cactus in the hammock, possibly because English is his second language.  Thought content appropriate  CN Exam: " YARELIS, EOM's I, VF full, Gaze conjugate No sensory lateralizations (No PP on face on today's exam), however he had a subtle right facial weakness.  Hearing I B, CNIX-XII I B  Motor: full power, age appropriate x 4 limbs  Sensory: intact  X 4 limbs, 4 mod inc lt, temp, (not PP tested on today's exam)  Cerebellar: He did well with cerebellar maneuvers  DTR's: Age appropriate, WNL; Plantars: downgoing  Gait: I did not gait him on today's exam.  His nurse reports that he is ambulating smoothly.       Lab Results: I have personally reviewed pertinent reports.  , CBC:   Results from last 7 days   Lab Units 05/12/24 0527 05/11/24  1330   WBC Thousand/uL 7.51 6.31   RBC Million/uL 4.95 4.45   HEMOGLOBIN g/dL 14.5 13.2   HEMATOCRIT % 43.3 38.9   MCV fL 88 87   PLATELETS Thousands/uL 223 206   , BMP/CMP:   Results from last 7 days   Lab Units 05/12/24 0527 05/11/24  1402   SODIUM mmol/L 137 136   POTASSIUM mmol/L 4.3 3.4*   CHLORIDE mmol/L 103 103   CO2 mmol/L 27 27   BUN mg/dL 12 15   CREATININE mg/dL 0.96 0.86   CALCIUM mg/dL 9.3 8.9   AST U/L  --  19   ALT U/L  --  23   ALK PHOS U/L  --  71   EGFR ml/min/1.73sq m 93 102   , Vitamin B12:   , HgBA1C:   , TSH:   Results from last 7 days   Lab Units 05/11/24  1402   TSH 3RD GENERATON uIU/mL 1.404   , Coagulation:   , Lipid Profile:   Results from last 7 days   Lab Units 05/12/24  0527   HDL mg/dL 59   LDL CALC mg/dL 205*   TRIGLYCERIDES mg/dL 88        Imaging Studies: I have personally reviewed pertinent films in PACS and I have reviewed his images, his CAT scan only so far is available, and I have reviewed the pictures at the bedside with the patient at his request.  His MRI is pending.    EEG, Echo, Pathology, and Other Studies:  His echocardiogram is pending likely to be done today.    Counseling / Coordination of Care  Total time spent today approximately 60 total minutes. Greater than 50% of total time was spent with the patient and / or family counseling and / or  coordination of care. A description of the counseling / coordination of care: All of the above was discussed and reviewed with the patient today at the bedside.  His language and speech is somewhat slow today because of both his stroke as well as I suspect because of English is his second language.  He had several questions I believe they were all answered to his satisfaction within the limits of the workup at this time.  His family was not available on today's exam.      Dictation voice to text software has been used in the creation of this document. Please consider this in light of any contextual or grammatical errors.

## 2024-05-12 NOTE — PROGRESS NOTES
[facility]  Progress Note  Name: Galdino Gaines I  MRN: [id number]  Unit/Bed#: S -[facility] I Date of Admission: 5/11/2024   Date of Service: 5/12/2024 I Hospital Day: 1    Assessment/Plan   * Sub Acute  Cerebrovascular accident (CVA) of left basal ganglia (HCC)  Assessment & Plan  Presented with 1 week of persistent hiccups.   Expressive aphasia and impaired finger-to-nose noted on admission exam  CT head: Hypodensity in the left basal ganglia concerning for subacute ischemia  No TNK or stroke alert in the ED due to symptoms being present for 1 week  At baseline patient is highly educated (Masters degree) and has no difficulty expressing himself fluently in English  The reflex arc that causes hiccups is thought to involve the basal ganglia - the patient's presenting symptoms are consistent with basal ganglia stroke   TSH within normal limits  Risk factor for CVA: Hypertension, severe hyperlipidemia      Plan:  Follow-up MRI brain  Follow up TTE  Monitor on tele  Follow up HbA1c  Atorvastatin 80 mg daily, aspirin 81 mg daily  PT/OT/Speech eval  Neurology consulted, appreciate recommendations    Intractable hiccups  Assessment & Plan  Possibly related to CVA, will start Thorazine     Constipation  Assessment & Plan  Patient reports last BM 2-3 days ago    Plan:  Senna 8.6mg daily  Miralax daily PRN    Hypertension  Assessment & Plan  Blood Pressure: 139/82  Patient reports history of hypertension, is on 1 medication at home but does not recall the name.  He does not recall the name of his primary physician.  He states that it is a private physician, not with [facility] or [facility].  Blood pressure currently well-controlled  The patient's stroke symptoms have been going on for 1 week. Do not need to allow for permissive HTN.     Plan:  Monitor blood pressure.   Avoid hypotension in the setting of CVA  The patient's sister will look up his home med  PRN Hydralazine for SBP  >160    Hypokalemia-resolved as of 2024  Assessment & Plan  Recent Labs     24  1402   K 3.4*   MG 1.9       Mild hypokalemia present on admission  Patient endorses poor po intake due to decreased appetite x 1 week    Plan:  Check Mg  Monitor BMP   K/Mg repletion as appropriate             VTE Pharmacologic Prophylaxis: VTE Score: 7 Moderate Risk (Score 3-4) - Pharmacological DVT Prophylaxis Ordered: enoxaparin (Lovenox).    Mobility:   Basic Mobility Inpatient Raw Score: 22  JH-HLM Goal: 7: Walk 25 feet or more  JH-HLM Achieved: 7: Walk 25 feet or more  JH-HLM Goal achieved. Continue to encourage appropriate mobility.    Patient Centered Rounds: I performed bedside rounds with nursing staff today.  Discussions with Specialists or Other Care Team Provider:     Education and Discussions with Family / Patient: Updated  (sister) via phone.    Current Length of Stay: 1 day(s)  Current Patient Status: Inpatient   Discharge Plan: Anticipate discharge in 24-48 hrs to home.    Code Status: Level 1 - Full Code    Subjective:   Patient resting in bed.  Has persistent hiccups.  Still having some word finding difficulty but denies any pain.    Objective:     Vitals:   Temp (24hrs), Av.9 °F (37.2 °C), Min:98.6 °F (37 °C), Max:99.3 °F (37.4 °C)    Temp:  [98.6 °F (37 °C)-99.3 °F (37.4 °C)] 99.3 °F (37.4 °C)  HR:  [56-66] 66  Resp:  [16-20] 20  BP: (124-152)/(74-85) 152/85  SpO2:  [98 %-100 %] 99 %  Body mass index is 25.79 kg/m².     Input and Output Summary (last 24 hours):     Intake/Output Summary (Last 24 hours) at 2024 1259  Last data filed at 2024 0239  Gross per 24 hour   Intake --   Output 650 ml   Net -650 ml       Physical Exam:   Physical Exam  Constitutional:       General: He is not in acute distress.     Appearance: He is not ill-appearing.   HENT:      Mouth/Throat:      Mouth: Mucous membranes are moist.      Pharynx: Oropharynx is clear.   Cardiovascular:      Rate and  Rhythm: Normal rate and regular rhythm.      Heart sounds: No murmur heard.  Pulmonary:      Effort: Pulmonary effort is normal. No respiratory distress.      Breath sounds: Normal breath sounds. No wheezing.   Abdominal:      General: Abdomen is flat. There is no distension.      Palpations: Abdomen is soft.      Tenderness: There is no abdominal tenderness.   Musculoskeletal:      Right lower leg: No edema.      Left lower leg: No edema.   Skin:     General: Skin is warm and dry.   Neurological:      Mental Status: He is alert.      Comments: Mild expressive aphasia and persistent hiccups          Additional Data:     Labs:  Results from last 7 days   Lab Units 05/12/24  0527   WBC Thousand/uL 7.51   HEMOGLOBIN g/dL 14.5   HEMATOCRIT % 43.3   PLATELETS Thousands/uL 223   SEGS PCT % 70   LYMPHO PCT % 20   MONO PCT % 8   EOS PCT % 2     Results from last 7 days   Lab Units 05/12/24  0527 05/11/24  1402   SODIUM mmol/L 137 136   POTASSIUM mmol/L 4.3 3.4*   CHLORIDE mmol/L 103 103   CO2 mmol/L 27 27   BUN mg/dL 12 15   CREATININE mg/dL 0.96 0.86   ANION GAP mmol/L 7 6   CALCIUM mg/dL 9.3 8.9   ALBUMIN g/dL  --  4.0   TOTAL BILIRUBIN mg/dL  --  0.71   ALK PHOS U/L  --  71   ALT U/L  --  23   AST U/L  --  19   GLUCOSE RANDOM mg/dL 92 99                       Lines/Drains:  Invasive Devices       Peripheral Intravenous Line  Duration             Peripheral IV 05/11/24 Left Antecubital <1 day                      Telemetry:  Telemetry Orders (From admission, onward)               24 Hour Telemetry Monitoring  Continuous x 24 Hours (Telem)        Expiring   Question:  Reason for 24 Hour Telemetry  Answer:  TIA/Suspected CVA/ Confirmed CVA                     Telemetry Reviewed: Normal Sinus Rhythm  Indication for Continued Telemetry Use: Acute CVA             Imaging: Reviewed radiology reports from this admission including: CT head    Recent Cultures (last 7 days):         Last 24 Hours Medication List:   Current  Facility-Administered Medications   Medication Dose Route Frequency Provider Last Rate    acetaminophen  650 mg Oral Q6H PRN Shana Weinstein MD      aspirin  81 mg Oral Daily Shana Weinstein MD      atorvastatin  80 mg Oral QPM Kwadwo Crowley DO      chlorproMAZINE  25 mg Oral Q6H Kwadwo Crowley DO      enoxaparin  40 mg Subcutaneous Q24H WILLIAM Shana Weinstein MD      hydrALAZINE  10 mg Intravenous Q6H PRN Shana Weinstein MD      ondansetron  4 mg Intravenous Q6H PRN Shana Weinstein MD      polyethylene glycol  17 g Oral Daily PRN Shana Weinstein MD      senna  1 tablet Oral Daily Shana Weinstein MD          Today, Patient Was Seen By: Kwadwo Crowley DO    **Please Note: This note may have been constructed using a voice recognition system.**

## 2024-05-12 NOTE — PLAN OF CARE
Problem: PHYSICAL THERAPY ADULT  Goal: Performs mobility at highest level of function for planned discharge setting.  See evaluation for individualized goals.  Description: Treatment/Interventions: Functional transfer training, LE strengthening/ROM, Cognitive reorientation, Patient/family training, Equipment eval/education, Bed mobility, Gait training, Endurance training, Therapeutic exercise, Elevations, Spoke to nursing, OT, ST, Spoke to case management  Equipment Recommended:  (TBD)       See flowsheet documentation for full assessment, interventions and recommendations.  5/12/2024 1321 by Shala Renae PT  Outcome: Progressing  Note: Prognosis: Fair  Problem List: Decreased endurance, Impaired balance, Decreased mobility, Decreased coordination, Decreased cognition (gait deviations, limited command following and consistency with verbal responses.)  Assessment: Pt is a 48 y.o. male seen for PT evaluation s/p admit to [facility] on 5/11/2024 w/ Cerebrovascular accident (CVA) of left basal ganglia (HCC).  Order placed for PT.      Prior to admission: Pt reports living with his sister in home w/o stairs, and was indep prior to admission. However uncertain of pt's consistency of PTA report based on responses to orientation questions.  Upon evaluation: Pt needs no physical A for bed mobility and transfers, but progressively more physical A for ambulation during hallway distances. .      Pt's clinical presentation is currently unstable/unpredictable given the functional mobility deficits above, especially (but not limited to) gait deviations and limited direction following vs verbal response , potential delay in motor planning and combined with medical complications including abnormal potassium values and intractable hiccups .  Pt IS NOT at his mobility baseline.  He is at risk for falls based on impaired balance and limited command following, balance .       During this admission, pt would benefit from  continued skilled inpatient PT in the acute care setting in order to address deficits as defined above to maximize function and mobility.      Recommendations:     From a PT standpoint, recommend next several sessions focus on continued mobility training w/+/- SPC for distances, stair training as indicated, higher level balance activities   Pt may benefit from SPEECH consult due to limited accurate verbalization and inconsistent command following .  Barriers to Discharge: Inaccessible home environment (uncertain of pt's home set up)     Rehab Resource Intensity Level, PT: (S) III (Minimum Resource Intensity) (however unsure of pt's PTA home set up and home support due to limited verbal communication)    See flowsheet documentation for full assessment.

## 2024-05-12 NOTE — PLAN OF CARE
Problem: OCCUPATIONAL THERAPY ADULT  Goal: Performs self-care activities at highest level of function for planned discharge setting.  See evaluation for individualized goals.  Description: Treatment Interventions: ADL retraining, Endurance training, Patient/family training, Neuromuscular reeducation, Compensatory technique education, Continued evaluation, Activityengagement          See flowsheet documentation for full assessment, interventions and recommendations.   Note: Limitation: Decreased ADL status, Decreased self-care trans, Decreased high-level ADLs  Prognosis: Fair  Assessment: Patient evaluated by Occupational Therapy.  Patient admitted with Cerebrovascular accident (CVA) of left basal ganglia (HCC).  The patients occupational profile, medical and therapy history includes a expanded review of medical and/or therapy records and additional review of physical, cognitive, or psychosocial history related to current functional performance.  Comorbidities affecting functional mobility and ADLS include: CVA and hypertension.  Prior to admission, patient was independent with functional mobility without assistive device, independent with ADLS, independent with IADLS, and living with sister and gregory family in a 2 level home with 0 steps to enter. See above for performance levels. The evaluation identifies the following performance deficits: impaired balance, decreased endurance, decreased coordination, increased fall risk, new onset of impairment of functional mobility, decreased ADLS, decreased activity tolerance, and decreased safety awareness, that result in activity limitations and/or participation restrictions. This evaluation requires clinical decision making of moderate complexity, because the patient may present with comorbidities that affect occupational performance and required minimal or moderate modification of tasks or assistance with the consideration of several treatment options.  The Barthel Index  was used as a functional outcome tool presenting with a score of Barthel Index Score: 90, .  The patient's raw score on the -PAC Daily Activity Inpatient Short Form is 20. A raw score of less than 19 suggests the patient may benefit from discharge to home.  Please refer to the recommendation of the Occupational Therapist for safe discharge planning. Patient will benefit from skilled Occupational Therapy services to address above deficits and facilitate a safe return to prior level of function.     Rehab Resource Intensity Level, OT: III (Minimum Resource Intensity)

## 2024-05-12 NOTE — PLAN OF CARE
Problem: PAIN - ADULT  Goal: Verbalizes/displays adequate comfort level or baseline comfort level  Description: Interventions:  - Encourage patient to monitor pain and request assistance  - Assess pain using appropriate pain scale  - Administer analgesics based on type and severity of pain and evaluate response  - Implement non-pharmacological measures as appropriate and evaluate response  - Consider cultural and social influences on pain and pain management  - Notify physician/advanced practitioner if interventions unsuccessful or patient reports new pain  Outcome: Progressing     Problem: SAFETY ADULT  Goal: Patient will remain free of falls  Description: INTERVENTIONS:  - Educate patient/family on patient safety including physical limitations  - Instruct patient to call for assistance with activity   - Consult OT/PT to assist with strengthening/mobility   - Keep Call bell within reach  - Keep bed low and locked with side rails adjusted as appropriate  - Keep care items and personal belongings within reach  - Initiate and maintain comfort rounds  - Make Fall Risk Sign visible to staff  - Offer Toileting every 2 Hours, in advance of need  - Initiate/Maintain bed alarm  - Obtain necessary fall risk management equipment: bed alarm, call bell in reach  - Apply yellow socks and bracelet for high fall risk patients  - Consider moving patient to room near nurses station  Outcome: Progressing  Goal: Maintain or return to baseline ADL function  Description: INTERVENTIONS:  -  Assess patient's ability to carry out ADLs; assess patient's baseline for ADL function and identify physical deficits which impact ability to perform ADLs (bathing, care of mouth/teeth, toileting, grooming, dressing, etc.)  - Assess/evaluate cause of self-care deficits   - Assess range of motion  - Assess patient's mobility; develop plan if impaired  - Assess patient's need for assistive devices and provide as appropriate  - Encourage maximum  independence but intervene and supervise when necessary  - Involve family in performance of ADLs  - Assess for home care needs following discharge   - Consider OT consult to assist with ADL evaluation and planning for discharge  - Provide patient education as appropriate  Outcome: Progressing  Goal: Maintains/Returns to pre admission functional level  Description: INTERVENTIONS:  - Perform AM-PAC 6 Click Basic Mobility/ Daily Activity assessment daily.  - Set and communicate daily mobility goal to care team and patient/family/caregiver.   - Collaborate with rehabilitation services on mobility goals if consulted  - Perform Range of Motion 3 times a day.  - Reposition patient every 2 hours.  - Dangle patient 3 times a day  - Stand patient 3 times a day  - Ambulate patient 3 times a day  - Out of bed to chair 3 times a day   - Out of bed for meals 3 times a day  - Out of bed for toileting  - Record patient progress and toleration of activity level   Outcome: Progressing     Problem: DISCHARGE PLANNING  Goal: Discharge to home or other facility with appropriate resources  Description: INTERVENTIONS:  - Identify barriers to discharge w/patient and caregiver  - Arrange for needed discharge resources and transportation as appropriate  - Identify discharge learning needs (meds, wound care, etc.)  - Arrange for interpretive services to assist at discharge as needed  - Refer to Case Management Department for coordinating discharge planning if the patient needs post-hospital services based on physician/advanced practitioner order or complex needs related to functional status, cognitive ability, or social support system  Outcome: Progressing     Problem: Knowledge Deficit  Goal: Patient/family/caregiver demonstrates understanding of disease process, treatment plan, medications, and discharge instructions  Description: Complete learning assessment and assess knowledge base.  Interventions:  - Provide teaching at level of  understanding  - Provide teaching via preferred learning methods  Outcome: Progressing     Problem: Neurological Deficit  Goal: Neurological status is stable or improving  Description: Interventions:  - Monitor and assess patient's level of consciousness, motor function, sensory function, and level of assistance needed for ADLs.   - Monitor and report changes from baseline. Collaborate with interdisciplinary team to initiate plan and implement interventions as ordered.   - Provide and maintain a safe environment.  - Consider seizure precautions.  - Consider fall precautions.  - Consider aspiration precautions.  - Consider bleeding precautions.  Outcome: Progressing     Problem: Activity Intolerance/Impaired Mobility  Goal: Mobility/activity is maintained at optimum level for patient  Description: Interventions:  - Assess and monitor patient  barriers to mobility and need for assistive/adaptive devices.  - Assess patient's emotional response to limitations.  - Collaborate with interdisciplinary team and initiate plans and interventions as ordered.  - Encourage independent activity per ability.  - Maintain proper body alignment.  - Perform active/passive rom as tolerated/ordered.  - Plan activities to conserve energy.  - Turn patient as appropriate  Outcome: Progressing     Problem: Communication Impairment  Goal: Ability to express needs and understand communication  Description: Assess patient's communication skills and ability to understand information.  Patient will demonstrate use of effective communication techniques, alternative methods of communication and understanding even if not able to speak.     - Encourage communication and provide alternate methods of communication as needed.  - Collaborate with case management/ for discharge needs.  - Include patient/family/caregiver in decisions related to communication.  Outcome: Progressing     Problem: Potential for Aspiration  Goal: Non-ventilated  patient's risk of aspiration is minimized  Description: Assess and monitor vital signs, respiratory status, and labs (WBC).  Monitor for signs of aspiration (tachypnea, cough, rales, wheezing, cyanosis, fever).    - Assess and monitor patient's ability to swallow.  - Place patient up in chair to eat if possible.  - HOB up at 90 degrees to eat if unable to get patient up into chair.  - Supervise patient during oral intake.   - Instruct patient/ family to take small bites.  - Instruct patient/ family to take small single sips when taking liquids.  - Follow patient-specific strategies generated by speech pathologist.  Outcome: Progressing  Goal: Ventilated patient's risk of aspiration is minimized  Description: Assess and monitor vital signs, respiratory status, airway cuff pressure, and labs (WBC).  Monitor for signs of aspiration (tachypnea, cough, rales, wheezing, cyanosis, fever).    - Elevate head of bed 30 degrees if patient has tube feeding.  - Monitor tube feeding.  Outcome: Progressing     Problem: Nutrition  Goal: Nutrition/Hydration status is improving  Description: Monitor and assess patient's nutrition/hydration status for malnutrition (ex- brittle hair, bruises, dry skin, pale skin and conjunctiva, muscle wasting, smooth red tongue, and disorientation). Collaborate with interdisciplinary team and initiate plan and interventions as ordered.  Monitor patient's weight and dietary intake as ordered or per policy. Utilize nutrition screening tool and intervene per policy. Determine patient's food preferences and provide high-protein, high-caloric foods as appropriate.     - Assist patient with eating.  - Allow adequate time for meals.  - Encourage patient to take dietary supplement as ordered.  - Collaborate with clinical nutritionist.  - Include patient/family/caregiver in decisions related to nutrition.  Outcome: Progressing

## 2024-05-12 NOTE — PHYSICAL THERAPY NOTE
PHYSICAL THERAPY EVALUATION  NAME:  Galdino Gaines  DATE: 05/12/24    AGE:   48 y.o.  Mrn:   [id number]  Principal problem: Principal Problem:    Sub Acute  Cerebrovascular accident (CVA) of left basal ganglia (HCC)  Active Problems:    Hypertension    Constipation    Intractable hiccups      Vitals:    05/12/24 0239 05/12/24 0500 05/12/24 0600 05/12/24 0725   BP: 139/82   152/85   BP Location:       Pulse: 59   66   Resp:    20   Temp:    99.3 °F (37.4 °C)   TempSrc:       SpO2: 99%   99%   Weight:  74.7 kg (164 lb 10.9 oz) 74.7 kg (164 lb 10.9 oz)    Height:           Length Of Stay: 1  Performed at least 2 patient identifiers during session: Name and Birthday  PHYSICAL THERAPY EVALUATION :    05/12/24 1102   PT Last Visit   PT Visit Date 05/12/24   Note Type   Note type Evaluation   Pain Assessment   Pain Assessment Tool 0-10   Pain Score No Pain   Restrictions/Precautions   Weight Bearing Precautions Per Order No   Other Precautions (S)    (Not formally a fall risk, but Pt appears to be at risk for falls)   Home Living   Type of Home House   Home Layout   (bedroom on first floor)   Bathroom Shower/Tub Walk-in shower   Additional Comments Patient is questionable on history.Patient states lives with sister and her family. He came 2 months ago from [employer] . His sister home his bed and bath is on the main level. Notified case management of same   Prior Function   Level of [place] Independent with ADLs;Independent with functional mobility   Lives With Family  (sister and family)   Vocational Full time employment  (reports [employer]. However pt has a Master's in Accounting, and all of his higher education has been in English so he speaks fluently at baseline)   Comments (S)  information above is from pt, ? reliability   General   Family/Caregiver Present No   Cognition   Overall Cognitive Status Impaired   Arousal/Participation Alert   Attention Difficulty attending to directions   Orientation Level  (S)  Disoriented to time  (October; did not give accurate birthdate)   Memory Unable to assess   Following Commands Follows one step commands without difficulty   Subjective   Subjective Pt cooperative, participatory, denies lightheadedness.   RUE Assessment   RUE Assessment WFL   LUE Assessment   LUE Assessment WFL   Strength RLE   R Hip Flexion 5/5   R Knee Extension 5/5   R Ankle Dorsiflexion 5/5   Strength LLE   L Hip Flexion 5/5   L Knee Extension 5/5   L Ankle Dorsiflexion 5/5   Vision-Basic Assessment   Current Vision Wears glasses all the time   Light Touch   RLE Light Touch Grossly intact  (with yes/no)   LLE Light Touch Grossly intact  (with yes/no)   Bed Mobility   Supine to Sit 6  Modified independent   Additional items Increased time required   Sit to Supine 6  Modified independent   Additional items Increased time required   Transfers   Sit to Stand 6  Modified independent   Additional items Increased time required   Stand to Sit 6  Modified independent   Additional items Increased time required   Ambulation/Elevation   Gait pattern Excessively slow;Step through pattern  (guarded, but no uncorrected losses of balance initially. However as gait trial progressed, pt amb SLOWER and with propulsive pattern.)   Gait Assistance   (initially S, progressing to min A)   Assistive Device None   Distance 20'+160'   Stair Management Assistance Not tested   Ambulation/Elevation Additional Comments (S)  When Pt asked what room number he saw, he was unable to verbalize it but was able to draw it with his finger   Balance   Static Sitting Fair +   Static Standing Fair -   Ambulatory Poor +   Endurance Deficit   Endurance Deficit Yes   Endurance Deficit Description degradation of gait quality w/ increased amb distance, less verbally responsive and less consistent with directions w/ increased distances   Activity Tolerance   Activity Tolerance Patient limited by fatigue   Medical Staff Made Aware care coordination w/  OT, spoke to Renetta from case management   Nurse Made Aware spoke to nursing   Assessment:   Pt is a 48 y.o. male seen for PT evaluation s/p admit to [facility] on 5/11/2024 w/ Cerebrovascular accident (CVA) of left basal ganglia (HCC).  Order placed for PT.      Prior to admission: Pt reports living with his sister in home w/o stairs, and was indep prior to admission. However uncertain of pt's consistency of PTA report based on responses to orientation questions.  Upon evaluation: Pt needs no physical A for bed mobility and transfers, but progressively more physical A for ambulation during hallway distances. .      Pt's clinical presentation is currently unstable/unpredictable given the functional mobility deficits above, especially (but not limited to) gait deviations and limited direction following vs verbal response, potential delay in motor planning and combined with medical complications including abnormal potassium values and intractable hiccups.  Pt IS NOT at his mobility baseline.  He is at risk for falls based on impaired balance and limited command following, balance.       During this admission, pt would benefit from continued skilled inpatient PT in the acute care setting in order to address deficits as defined above to maximize function and mobility.      Recommendations:    From a PT standpoint, recommend next several sessions focus on continued mobility training w/+/- SPC for distances, stair training as indicated, higher level balance activities  Pt may benefit from SPEECH consult due to limited accurate verbalization and inconsistent command following .         Prognosis Fair   Problem List Decreased endurance;Impaired balance;Decreased mobility;Decreased coordination;Decreased cognition  (gait deviations, limited command following and consistency with verbal responses.)   Barriers to Discharge Inaccessible home environment  (uncertain of pt's home set up)   Goals   Patient Goals unable to  express @ this time verbally   STG Expiration Date 05/22/24   Short Term Goal #1 Goals: Pt will: Perform rolling  and supine<>sit bed mobility tasks with modified I to prepare for transfers and reposition in bed. Perform transfers with modified I to promote proper hand placement and approach. Perform ambulation with LRAD PRN for >150' with Supervision to increase Indep in home environment and community. Perform at least 4 stairs w/ and w/o railing +/- DME and w/Supervision to decrease risk for falls with community barriers. Improve 4 point DGI score to 10/12 or better to demonstrate less risk for falls   PT Treatment Day 1   Plan   Treatment/Interventions Functional transfer training;LE strengthening/ROM;Cognitive reorientation;Patient/family training;Equipment eval/education;Bed mobility;Gait training;Endurance training;Therapeutic exercise;Elevations;Spoke to nursing;OT;ST;Spoke to case management   PT Frequency 4-6x/wk   Discharge Recommendation   Rehab Resource Intensity Level, PT (S)  III (Minimum Resource Intensity)  (however unsure of pt's PTA home set up and home support due to limited verbal communication)   Equipment Recommended   (TBD)   Additional Comments 2 Personal factors affecting pt at time of IE include: inability to perform current job functions, inability to navigate community distances, limited insight into impairments, preferred language not English (language barrier), and ? Home support and home environmental barriers .   AM-PAC Basic Mobility Inpatient   Turning in Flat Bed Without Bedrails 4   Lying on Back to Sitting on Edge of Flat Bed Without Bedrails 4   Moving Bed to Chair 3   Standing Up From Chair Using Arms 3   Walk in Room 3   Climb 3-5 Stairs With Railing 2   Basic Mobility Inpatient Raw Score 19   Basic Mobility Standardized Score 42.48   [facility] Highest Level Of Mobility   -HLM Goal 6: Walk 10 steps or more   -HLM Achieved 7: Walk 25 feet or more   Dynamic Gait Index  "  Gait level surface  2   Change in gait speed 1   Gait with horizontal head turns  2   Gait with vertical head turns  (S)  2  (7/12 4 point DGI)   End of Consult   Patient Position at End of Consult All needs within reach;Bed/Chair alarm activated;Supine   Pt requires PT /OT co-eval due to required skilled interventions of at least 2 clinicians for care delivery, medical complexity, limited activity tolerance, and cognitive-behavioral impairments.   PT and OT goals addressed separately.   (Please find full objective findings from PT assessment regarding body systems outlined above).     The patient's -[facility] Basic Mobility Inpatient Short Form Raw Score is 19. A Raw score of greater than 16 suggests the patient may benefit from discharge to home, which MAY coincide with CURRENT above PT recommendations.     However please refer to therapist recommendation for discharge planning given other factors that may influence destination.     Adapted from David SPICER, Myra J, Kris J, Corey WINSLOW. [employer] [facility] “6-Clicks” Basic Mobility and Daily Activity Scores With Discharge Destination. Physical Therapy, 2021;101:1-9. DOI: [email or web address]    Portions of the record may have been created with voice recognition software.  Occasional wrong word or \"sound a like\" substitutions may have occurred due to the inherent limitations of voice recognition software.  Read the chart carefully and recognize, using context, where substitutions have occurred    "

## 2024-05-12 NOTE — PLAN OF CARE
Problem: PAIN - ADULT  Goal: Verbalizes/displays adequate comfort level or baseline comfort level  Description: Interventions:  - Encourage patient to monitor pain and request assistance  - Assess pain using appropriate pain scale  - Administer analgesics based on type and severity of pain and evaluate response  - Implement non-pharmacological measures as appropriate and evaluate response  - Consider cultural and social influences on pain and pain management  - Notify physician/advanced practitioner if interventions unsuccessful or patient reports new pain  Outcome: Progressing     Problem: SAFETY ADULT  Goal: Patient will remain free of falls  Description: INTERVENTIONS:  - Educate patient/family on patient safety including physical limitations  - Instruct patient to call for assistance with activity   - Consult OT/PT to assist with strengthening/mobility   - Keep Call bell within reach  - Keep bed low and locked with side rails adjusted as appropriate  - Keep care items and personal belongings within reach  - Initiate and maintain comfort rounds  - Make Fall Risk Sign visible to staff  - Offer Toileting every  Hours, in advance of need  - Initiate/Maintain alarm  - Obtain necessary fall risk management equipment:   - Apply yellow socks and bracelet for high fall risk patients  - Consider moving patient to room near nurses station  Outcome: Progressing  Goal: Maintain or return to baseline ADL function  Description: INTERVENTIONS:  -  Assess patient's ability to carry out ADLs; assess patient's baseline for ADL function and identify physical deficits which impact ability to perform ADLs (bathing, care of mouth/teeth, toileting, grooming, dressing, etc.)  - Assess/evaluate cause of self-care deficits   - Assess range of motion  - Assess patient's mobility; develop plan if impaired  - Assess patient's need for assistive devices and provide as appropriate  - Encourage maximum independence but intervene and supervise  when necessary  - Involve family in performance of ADLs  - Assess for home care needs following discharge   - Consider OT consult to assist with ADL evaluation and planning for discharge  - Provide patient education as appropriate  Outcome: Progressing  Goal: Maintains/Returns to pre admission functional level  Description: INTERVENTIONS:  - Perform AM-PAC 6 Click Basic Mobility/ Daily Activity assessment daily.  - Set and communicate daily mobility goal to care team and patient/family/caregiver.   - Collaborate with rehabilitation services on mobility goals if consulted  - Perform Range of Motion  times a day.  - Reposition patient every  hours.  - Dangle patient  times a day  - Stand patient  times a day  - Ambulate patient  times a day  - Out of bed to chair  times a day   - Out of bed for meal times a day  - Out of bed for toileting  - Record patient progress and toleration of activity level   Outcome: Progressing     Problem: DISCHARGE PLANNING  Goal: Discharge to home or other facility with appropriate resources  Description: INTERVENTIONS:  - Identify barriers to discharge w/patient and caregiver  - Arrange for needed discharge resources and transportation as appropriate  - Identify discharge learning needs (meds, wound care, etc.)  - Arrange for interpretive services to assist at discharge as needed  - Refer to Case Management Department for coordinating discharge planning if the patient needs post-hospital services based on physician/advanced practitioner order or complex needs related to functional status, cognitive ability, or social support system  Outcome: Progressing     Problem: Knowledge Deficit  Goal: Patient/family/caregiver demonstrates understanding of disease process, treatment plan, medications, and discharge instructions  Description: Complete learning assessment and assess knowledge base.  Interventions:  - Provide teaching at level of understanding  - Provide teaching via preferred learning  methods  Outcome: Progressing     Problem: Neurological Deficit  Goal: Neurological status is stable or improving  Description: Interventions:  - Monitor and assess patient's level of consciousness, motor function, sensory function, and level of assistance needed for ADLs.   - Monitor and report changes from baseline. Collaborate with interdisciplinary team to initiate plan and implement interventions as ordered.   - Provide and maintain a safe environment.  - Consider seizure precautions.  - Consider fall precautions.  - Consider aspiration precautions.  - Consider bleeding precautions.  Outcome: Progressing     Problem: Activity Intolerance/Impaired Mobility  Goal: Mobility/activity is maintained at optimum level for patient  Description: Interventions:  - Assess and monitor patient  barriers to mobility and need for assistive/adaptive devices.  - Assess patient's emotional response to limitations.  - Collaborate with interdisciplinary team and initiate plans and interventions as ordered.  - Encourage independent activity per ability.  - Maintain proper body alignment.  - Perform active/passive rom as tolerated/ordered.  - Plan activities to conserve energy.  - Turn patient as appropriate  Outcome: Progressing     Problem: Communication Impairment  Goal: Ability to express needs and understand communication  Description: Assess patient's communication skills and ability to understand information.  Patient will demonstrate use of effective communication techniques, alternative methods of communication and understanding even if not able to speak.     - Encourage communication and provide alternate methods of communication as needed.  - Collaborate with case management/ for discharge needs.  - Include patient/family/caregiver in decisions related to communication.  Outcome: Progressing     Problem: Potential for Aspiration  Goal: Non-ventilated patient's risk of aspiration is minimized  Description:  Assess and monitor vital signs, respiratory status, and labs (WBC).  Monitor for signs of aspiration (tachypnea, cough, rales, wheezing, cyanosis, fever).    - Assess and monitor patient's ability to swallow.  - Place patient up in chair to eat if possible.  - HOB up at 90 degrees to eat if unable to get patient up into chair.  - Supervise patient during oral intake.   - Instruct patient/ family to take small bites.  - Instruct patient/ family to take small single sips when taking liquids.  - Follow patient-specific strategies generated by speech pathologist.  Outcome: Progressing  Goal: Ventilated patient's risk of aspiration is minimized  Description: Assess and monitor vital signs, respiratory status, airway cuff pressure, and labs (WBC).  Monitor for signs of aspiration (tachypnea, cough, rales, wheezing, cyanosis, fever).    - Elevate head of bed 30 degrees if patient has tube feeding.  - Monitor tube feeding.  Outcome: Progressing     Problem: Nutrition  Goal: Nutrition/Hydration status is improving  Description: Monitor and assess patient's nutrition/hydration status for malnutrition (ex- brittle hair, bruises, dry skin, pale skin and conjunctiva, muscle wasting, smooth red tongue, and disorientation). Collaborate with interdisciplinary team and initiate plan and interventions as ordered.  Monitor patient's weight and dietary intake as ordered or per policy. Utilize nutrition screening tool and intervene per policy. Determine patient's food preferences and provide high-protein, high-caloric foods as appropriate.     - Assist patient with eating.  - Allow adequate time for meals.  - Encourage patient to take dietary supplement as ordered.  - Collaborate with clinical nutritionist.  - Include patient/family/caregiver in decisions related to nutrition.  Outcome: Progressing     Problem: CARDIOVASCULAR - ADULT  Goal: Maintains optimal cardiac output and hemodynamic stability  Description: INTERVENTIONS:  -  Monitor I/O, vital signs and rhythm  - Monitor for S/S and trends of decreased cardiac output  - Administer and titrate ordered vasoactive medications to optimize hemodynamic stability  - Assess quality of pulses, skin color and temperature  - Assess for signs of decreased coronary artery perfusion  - Instruct patient to report change in severity of symptoms  Outcome: Progressing  Goal: Absence of cardiac dysrhythmias or at baseline rhythm  Description: INTERVENTIONS:  - Continuous cardiac monitoring, vital signs, obtain 12 lead EKG if ordered  - Administer antiarrhythmic and heart rate control medications as ordered  - Monitor electrolytes and administer replacement therapy as ordered  Outcome: Progressing

## 2024-05-13 PROBLEM — E78.5 HYPERLIPIDEMIA: Status: ACTIVE | Noted: 2024-05-13

## 2024-05-13 LAB
ANION GAP SERPL CALCULATED.3IONS-SCNC: 9 MMOL/L (ref 4–13)
BASOPHILS # BLD AUTO: 0.03 THOUSANDS/ÂΜL (ref 0–0.1)
BASOPHILS NFR BLD AUTO: 1 % (ref 0–1)
BUN SERPL-MCNC: 12 MG/DL (ref 5–25)
CALCIUM SERPL-MCNC: 9.5 MG/DL (ref 8.4–10.2)
CHLORIDE SERPL-SCNC: 103 MMOL/L (ref 96–108)
CO2 SERPL-SCNC: 25 MMOL/L (ref 21–32)
CREAT SERPL-MCNC: 1.02 MG/DL (ref 0.6–1.3)
DEPRECATED AT III PPP: 120 % OF NORMAL (ref 92–136)
EOSINOPHIL # BLD AUTO: 0.16 THOUSAND/ÂΜL (ref 0–0.61)
EOSINOPHIL NFR BLD AUTO: 3 % (ref 0–6)
ERYTHROCYTE [DISTWIDTH] IN BLOOD BY AUTOMATED COUNT: 12.3 % (ref 11.6–15.1)
EST. AVERAGE GLUCOSE BLD GHB EST-MCNC: 97 MG/DL
GFR SERPL CREATININE-BSD FRML MDRD: 86 ML/MIN/1.73SQ M
GLUCOSE SERPL-MCNC: 89 MG/DL (ref 65–140)
HBA1C MFR BLD: 5 %
HCT VFR BLD AUTO: 43.7 % (ref 36.5–49.3)
HGB BLD-MCNC: 14.5 G/DL (ref 12–17)
IMM GRANULOCYTES # BLD AUTO: 0.01 THOUSAND/UL (ref 0–0.2)
IMM GRANULOCYTES NFR BLD AUTO: 0 % (ref 0–2)
LYMPHOCYTES # BLD AUTO: 2.08 THOUSANDS/ÂΜL (ref 0.6–4.47)
LYMPHOCYTES NFR BLD AUTO: 42 % (ref 14–44)
MCH RBC QN AUTO: 29.7 PG (ref 26.8–34.3)
MCHC RBC AUTO-ENTMCNC: 33.2 G/DL (ref 31.4–37.4)
MCV RBC AUTO: 89 FL (ref 82–98)
MONOCYTES # BLD AUTO: 0.41 THOUSAND/ÂΜL (ref 0.17–1.22)
MONOCYTES NFR BLD AUTO: 8 % (ref 4–12)
NEUTROPHILS # BLD AUTO: 2.23 THOUSANDS/ÂΜL (ref 1.85–7.62)
NEUTS SEG NFR BLD AUTO: 46 % (ref 43–75)
NRBC BLD AUTO-RTO: 0 /100 WBCS
PLATELET # BLD AUTO: 206 THOUSANDS/UL (ref 149–390)
PMV BLD AUTO: 9.4 FL (ref 8.9–12.7)
POTASSIUM SERPL-SCNC: 4.5 MMOL/L (ref 3.5–5.3)
PROT C AG ACT/NOR PPP IA: >150 % OF NORMAL (ref 60–150)
PROT S ACT/NOR PPP: 91 % (ref 71–117)
RBC # BLD AUTO: 4.89 MILLION/UL (ref 3.88–5.62)
SODIUM SERPL-SCNC: 137 MMOL/L (ref 135–147)
WBC # BLD AUTO: 4.92 THOUSAND/UL (ref 4.31–10.16)

## 2024-05-13 PROCEDURE — 99232 SBSQ HOSP IP/OBS MODERATE 35: CPT | Performed by: STUDENT IN AN ORGANIZED HEALTH CARE EDUCATION/TRAINING PROGRAM

## 2024-05-13 PROCEDURE — 85025 COMPLETE CBC W/AUTO DIFF WBC: CPT | Performed by: INTERNAL MEDICINE

## 2024-05-13 PROCEDURE — 99232 SBSQ HOSP IP/OBS MODERATE 35: CPT | Performed by: INTERNAL MEDICINE

## 2024-05-13 PROCEDURE — 80048 BASIC METABOLIC PNL TOTAL CA: CPT | Performed by: INTERNAL MEDICINE

## 2024-05-13 PROCEDURE — 92523 SPEECH SOUND LANG COMPREHEN: CPT

## 2024-05-13 RX ORDER — CHLORPROMAZINE HYDROCHLORIDE 25 MG/1
25 TABLET, FILM COATED ORAL EVERY 6 HOURS PRN
Status: DISCONTINUED | OUTPATIENT
Start: 2024-05-13 | End: 2024-05-14 | Stop reason: HOSPADM

## 2024-05-13 RX ORDER — SODIUM CHLORIDE 9 MG/ML
75 INJECTION, SOLUTION INTRAVENOUS CONTINUOUS
Status: DISCONTINUED | OUTPATIENT
Start: 2024-05-13 | End: 2024-05-13

## 2024-05-13 RX ORDER — SODIUM CHLORIDE, SODIUM GLUCONATE, SODIUM ACETATE, POTASSIUM CHLORIDE, MAGNESIUM CHLORIDE, SODIUM PHOSPHATE, DIBASIC, AND POTASSIUM PHOSPHATE .53; .5; .37; .037; .03; .012; .00082 G/100ML; G/100ML; G/100ML; G/100ML; G/100ML; G/100ML; G/100ML
75 INJECTION, SOLUTION INTRAVENOUS CONTINUOUS
Status: DISPENSED | OUTPATIENT
Start: 2024-05-13 | End: 2024-05-13

## 2024-05-13 RX ADMIN — SODIUM CHLORIDE, SODIUM GLUCONATE, SODIUM ACETATE, POTASSIUM CHLORIDE, MAGNESIUM CHLORIDE, SODIUM PHOSPHATE, DIBASIC, AND POTASSIUM PHOSPHATE 75 ML/HR: .53; .5; .37; .037; .03; .012; .00082 INJECTION, SOLUTION INTRAVENOUS at 10:25

## 2024-05-13 RX ADMIN — ATORVASTATIN CALCIUM 80 MG: 40 TABLET, FILM COATED ORAL at 17:42

## 2024-05-13 RX ADMIN — CHLORPROMAZINE HYDROCHLORIDE 25 MG: 25 TABLET, FILM COATED ORAL at 05:28

## 2024-05-13 RX ADMIN — ASPIRIN 81 MG: 81 TABLET, COATED ORAL at 09:00

## 2024-05-13 RX ADMIN — SENNOSIDES 8.6 MG: 8.6 TABLET, FILM COATED ORAL at 09:00

## 2024-05-13 RX ADMIN — ENOXAPARIN SODIUM 40 MG: 40 INJECTION SUBCUTANEOUS at 09:00

## 2024-05-13 NOTE — QUICK NOTE
RAYRAY requested by Neurology for stroke. I spoke with patient who is agreeable to the procedure. Will keep NPO after midnight tonight with hopeful RAYRAY tomorrow. No contraindications to the procedure.

## 2024-05-13 NOTE — CASE MANAGEMENT
Case Management Progress Note    Patient name Galdino Gaines  Location S /S -01 MRN [id number]  : 1975 Date 2024       LOS (days): 2  Geometric Mean LOS (GMLOS) (days):   Days to GMLOS:        OBJECTIVE:        Current admission status: Inpatient  Preferred Pharmacy:   [id number] - [place], [place] - [address]  [address]  [place] [address]  Phone: [phone] Fax: [phone]    Primary Care Provider: No primary care provider on file.    Primary Insurance: [employer] PENDING  Secondary Insurance:     PROGRESS NOTE:    Cm received TT that patient and family would like to meet with Cm. Cm met with patient and family at bedside. Per family- patient does not have insurance, family requesting referral be made to [facility]. Cm submitted email to the Hospital financial counselor regarding patient. Cm to follow for any other needs.

## 2024-05-13 NOTE — ASSESSMENT & PLAN NOTE
"48 y.o. male with HTN and HLD who has not seen a medical provider in several years who presented to [facility] on 5/11/2024 due to intractable hiccups x 5 days and \"feeling off.\"  He was also noted to have mild aphasia with paraphasic errors.  CT head showed hypodensity in the left basal ganglia concerning for CVA, so the patient was placed on stroke pathway.    MRI brain revealed acute infarct in the left corona radiata extending into the left gangliocapsular region.    CTA head/neck negative for LVO, significant stenosis, or aneurysm.  Echo: EF 70%, grade 1 diastolic relaxation, normal atria size bilaterally  , cholesterol 282, A1C 5.0    Etiology for left basal ganglia infarct remains unclear, but likely due to poorly controlled vascular risk factors.  However, cannot rule out cardioembolic stroke or hypercoagulable state.  While medullary strokes are the most common cause of intractable hiccups, there are some case studies with basal ganglia strokes causing intractable hiccups.    Plan:  - Stroke pathway  Recommend RAYRAY  Recommend outpatient loop recorder  Thrombosis panel in process  Continue Aspirin 81 mg daily (antiplatelet naive PTA)  Continue Atorvastatin 80 mg daily (statin naive PTA)  Continue Thorazine 25 mg Q6 hours PRN hiccups (hiccups have resolved)  Normotensive goal  Euglycemic, normothermic goal  Continue telemetry  PT/OT/ST  Stroke education  Frequent neuro checks. Continue to monitor and notify neurology with any changes.  STAT CT head for any acute change in neuro exam  - Patient will require help in finding a PCP  - Medical management and supportive care per primary team. Correction of any metabolic or infectious disturbances.   "

## 2024-05-13 NOTE — ASSESSMENT & PLAN NOTE
Presented with 1 week of persistent hiccups.   Expressive aphasia and impaired finger-to-nose noted on admission exam  CT head: Hypodensity in the left basal ganglia concerning for subacute ischemia  No TNK or stroke alert in the ED due to symptoms being present for 1 week  At baseline patient is highly educated (Masters degree) and has no difficulty expressing himself fluently in English  TSH, HbA1c within normal limits  Risk factor for CVA: Hypertension, Severe hyperlipidemia  CTA: No occlusion or stenosis of the major vessels  MRI brain: Acute infarct in the corona radiata, extending to the left gangliocapsular region  TTE: LVEF 70%, G1DD. Bubble study not performed  Episode of lethargy and worsening expressive aphasia overnight, however resolved by morning exam.   STAT CTA overnight on 5/12 re-demonstrated the basal ganglia infarct. No hemorrhage.     Plan:  RAYRAY tomorrow per neurology recommendations  NPO after midnight  Monitor on tele  Follow up thrombosis panel, pending  Atorvastatin 80 mg daily, aspirin 81 mg daily  PT/OT/Speech eval  BP Goal: Normotension (-160)  Neurology consulted, appreciate recommendations

## 2024-05-13 NOTE — PROGRESS NOTES
[facility]  Progress Note  Name: Galdino Gaines I  MRN: [id number]  Unit/Bed#: S -01 I Date of Admission: 5/11/2024   Date of Service: 5/13/2024 I Hospital Day: 2    Assessment/Plan   * Cerebrovascular accident (CVA) of left basal ganglia (HCC)  Assessment & Plan  Presented with 1 week of persistent hiccups.   Expressive aphasia and impaired finger-to-nose noted on admission exam  CT head: Hypodensity in the left basal ganglia concerning for subacute ischemia  No TNK or stroke alert in the ED due to symptoms being present for 1 week  At baseline patient is highly educated (Masters degree) and has no difficulty expressing himself fluently in English  TSH, HbA1c within normal limits  Risk factor for CVA: Hypertension, Severe hyperlipidemia  CTA: No occlusion or stenosis of the major vessels  MRI brain: Acute infarct in the corona radiata, extending to the left gangliocapsular region  TTE: LVEF 70%, G1DD. Bubble study not performed  Episode of lethargy and worsening expressive aphasia overnight, however resolved by morning exam.   STAT CTA overnight on 5/12 re-demonstrated the basal ganglia infarct. No hemorrhage.     Plan:  RAYRAY tomorrow per neurology recommendations  NPO after midnight  Monitor on tele  Follow up thrombosis panel, pending  Atorvastatin 80 mg daily, aspirin 81 mg daily  PT/OT/Speech eval  BP Goal: Normotension (-160)  Neurology consulted, appreciate recommendations    Hypertension  Assessment & Plan  Blood Pressure: 117/74  Patient reports history of hypertension, is on 1 medication at home but does not recall the name.  The patient's stroke symptoms have been going on for 1 week. Do not need to allow for permissive HTN.     Plan:  Monitor blood pressure.   BP Goal: Normotension (-160)  Start IV fluids, as BP has been below goal  PRN Hydralazine for SBP >160    Hyperlipidemia  Assessment & Plan  Lab Results   Component Value Date    CHOLESTEROL 282 (H)  05/12/2024    TRIG 88 05/12/2024    HDL 59 05/12/2024    LDLCALC 205 (H) 05/12/2024     Atorvastatin 80 mg daily    Intractable hiccups  Assessment & Plan  Possibly related to CVA  Thorazine started 5/12, with resolution of hiccups, however may have contributed to episode of lethargy overnight 5/12-5/13    Plan:  Change Thorazine from scheduled to PRN    Constipation  Assessment & Plan  Patient reports last BM 2-3 days PTA    Plan:  Senna 8.6mg daily  Miralax daily PRN           VTE Pharmacologic Prophylaxis: VTE Score: 7 High Risk (Score >/= 5) - Pharmacological DVT Prophylaxis Ordered: enoxaparin (Lovenox). Sequential Compression Devices Ordered.    Mobility:   Basic Mobility Inpatient Raw Score: 19  JH-HLM Goal: 6: Walk 10 steps or more  JH-HLM Achieved: 6: Walk 10 steps or more  JH-HLM Goal achieved. Continue to encourage appropriate mobility.    Patient Centered Rounds:  Will contact RN on rounds  Discussions with Specialists or Other Care Team Provider: Neurology note reviewed    Education and Discussions with Family / Patient: Updated  (sister and brother in law) at bedside.    Current Length of Stay: 2 day(s)  Current Patient Status: Inpatient   Discharge Plan: Anticipate discharge in 24-48 hrs to discharge location to be determined pending rehab evaluations.    Code Status: Level 1 - Full Code    Subjective:   Overnight, the patient's nurse noticed patient was more lethargic with worsening expressive aphasia and difficulty following commands overnight.STAT CTA was ordered.     On morning exam, patient was alert and orientated. He was able to answer questions correctly, but he was slow to respond. Patients' family in the room noted that his expressive aphasia was much less concerning to them compared to yesterday. But he is not back to his baseline. Patient also denies any hiccups. He denied fever, chills, N/V/D/C, or abdominal pain.     The patient's sister in the room also clarified that  there is a history of CVA on his mother's side, but she does not know details because they are half siblings with different mothers. There are not clotting disorders in the family. Sickle cell disease runs in the family, and the patient has sickle cell trait. All questions were answered.     Objective:     Vitals:   Temp (24hrs), Av.7 °F (36.5 °C), Min:97.6 °F (36.4 °C), Max:97.8 °F (36.6 °C)    Temp:  [97.6 °F (36.4 °C)-97.8 °F (36.6 °C)] 97.8 °F (36.6 °C)  HR:  [57-71] 69  Resp:  [16] 16  BP: (117-127)/(64-78) 117/74  SpO2:  [97 %-99 %] 97 %  Body mass index is 25.22 kg/m².     Input and Output Summary (last 24 hours):     Intake/Output Summary (Last 24 hours) at 2024 1506  Last data filed at 2024 2342  Gross per 24 hour   Intake --   Output 750 ml   Net -750 ml       Physical Exam:   Physical Exam  Vitals and nursing note reviewed.   Constitutional:       General: He is not in acute distress.     Appearance: He is well-developed.   HENT:      Head: Normocephalic and atraumatic.      Right Ear: External ear normal.      Left Ear: External ear normal.      Mouth/Throat:      Pharynx: Oropharynx is clear. No oropharyngeal exudate.   Eyes:      General: No visual field deficit.     Conjunctiva/sclera: Conjunctivae normal.   Cardiovascular:      Rate and Rhythm: Normal rate and regular rhythm.      Heart sounds: No murmur heard.     No friction rub. No gallop.   Pulmonary:      Effort: Pulmonary effort is normal. No respiratory distress.      Breath sounds: Normal breath sounds. No wheezing or rales.   Abdominal:      Palpations: Abdomen is soft.      Tenderness: There is no abdominal tenderness. There is no guarding.   Musculoskeletal:         General: No swelling.      Cervical back: Neck supple.   Skin:     General: Skin is warm and dry.      Capillary Refill: Capillary refill takes less than 2 seconds.   Neurological:      Mental Status: He is alert and oriented to person, place, and time.       Cranial Nerves: No cranial nerve deficit or dysarthria.      Sensory: No sensory deficit.      Motor: No weakness, tremor or atrophy.      Coordination: Romberg sign negative. Finger-Nose-Finger Test (slow movements but could complete) and Heel to Shin Test (slow movements but could complete) normal.      Comments: Expressive aphasia. Patient continues to be slow to answer questions, answers in short sentences, and has word-finding difficulty. Sentences now contain more words than on the day of admission, but are still short.    Psychiatric:         Mood and Affect: Mood normal.          Additional Data:     Labs:  Results from last 7 days   Lab Units 05/13/24  0528   WBC Thousand/uL 4.92   HEMOGLOBIN g/dL 14.5   HEMATOCRIT % 43.7   PLATELETS Thousands/uL 206   SEGS PCT % 46   LYMPHO PCT % 42   MONO PCT % 8   EOS PCT % 3     Results from last 7 days   Lab Units 05/13/24  0528 05/12/24  0527 05/11/24  1402   SODIUM mmol/L 137   < > 136   POTASSIUM mmol/L 4.5   < > 3.4*   CHLORIDE mmol/L 103   < > 103   CO2 mmol/L 25   < > 27   BUN mg/dL 12   < > 15   CREATININE mg/dL 1.02   < > 0.86   ANION GAP mmol/L 9   < > 6   CALCIUM mg/dL 9.5   < > 8.9   ALBUMIN g/dL  --   --  4.0   TOTAL BILIRUBIN mg/dL  --   --  0.71   ALK PHOS U/L  --   --  71   ALT U/L  --   --  23   AST U/L  --   --  19   GLUCOSE RANDOM mg/dL 89   < > 99    < > = values in this interval not displayed.             Results from last 7 days   Lab Units 05/12/24  0527   HEMOGLOBIN A1C % 5.0           Lines/Drains:  Invasive Devices       Peripheral Intravenous Line  Duration             Peripheral IV 05/11/24 Left Antecubital 2 days                      Telemetry:  Telemetry Orders (From admission, onward)               24 Hour Telemetry Monitoring  Continuous x 24 Hours (Telem)        Question:  Reason for 24 Hour Telemetry  Answer:  TIA/Suspected CVA/ Confirmed CVA                     Telemetry Reviewed: Normal Sinus Rhythm  Indication for Continued Telemetry  Use: Acute CVA             Imaging: Reviewed radiology reports from this admission including: chest xray, CT head, and MRI brain and Personally reviewed the following imaging: CT head and MRI brain  CTA head and neck w wo contrast  Result Date: 5/12/2024  Impression: Acute/subacute infarct redemonstrated in the left basal ganglia, as described; mildly increased in size compared to the prior. No intracranial hemorrhage identified. No occlusion, severe stenosis or aneurysm of the major arteries of the head and neck. No significant stenosis within either internal carotid artery.  Less than 50% stenosis by NASCET criteria. Patent bilateral vertebral arteries. Other findings as above. Clinical follow-up recommended. Workstation performed: [id number]     MRI brain wo contrastResult Date: 5/12/2024  Impression: Acute infarct in the left corona radiata extending into the left gangliocapsular region. I personally discussed this study with MYKE SKINNER on 5/12/2024 5:14 PM. Workstation performed: [id number]     XR chest portable  Result Date: 5/11/2024  Impression: No acute cardiopulmonary disease. Workstation performed: [id number]     CT head without contrast  Result Date: 5/11/2024  Impression: Hypodensity in the left basal ganglia measuring approximately 3.0 x 1.3 cm, concerning for subacute ischemia. MRI recommended. Workstation performed: [id number]    disease. Workstation performed: [id number]        Recent Cultures (last 7 days):         Last 24 Hours Medication List:   Current Facility-Administered Medications   Medication Dose Route Frequency Provider Last Rate    acetaminophen  650 mg Oral Q6H PRN Shana Weinstein MD      aspirin  81 mg Oral Daily Kwadwo Crowley DO      atorvastatin  80 mg Oral QPM Kwadwo Crowley DO      chlorproMAZINE  25 mg Oral Q6H PRN Shana Weinstein MD      enoxaparin  40 mg Subcutaneous Q24H WILLIAM Shana Weinstein MD      hydrALAZINE  10 mg Intravenous Q6H PRN Shana Weinstein MD       multi-electrolyte  75 mL/hr Intravenous Continuous Kwadwo Crowley DO 75 mL/hr (05/13/24 1025)    ondansetron  4 mg Intravenous Q6H PRN Shana Weinstein MD      polyethylene glycol  17 g Oral Daily PRN Shana Weinstein MD      senna  1 tablet Oral Daily Shana Weinstein MD          Today, Patient Was Seen By: Shana Weinstein MD    **Please Note: This note may have been constructed using a voice recognition system.**

## 2024-05-13 NOTE — ASSESSMENT & PLAN NOTE
Lab Results   Component Value Date    CHOLESTEROL 282 (H) 05/12/2024    TRIG 88 05/12/2024    HDL 59 05/12/2024    LDLCALC 205 (H) 05/12/2024     Atorvastatin 80 mg daily

## 2024-05-13 NOTE — PLAN OF CARE
Problem: PAIN - ADULT  Goal: Verbalizes/displays adequate comfort level or baseline comfort level  Description: Interventions:  - Encourage patient to monitor pain and request assistance  - Assess pain using appropriate pain scale  - Administer analgesics based on type and severity of pain and evaluate response  - Implement non-pharmacological measures as appropriate and evaluate response  - Consider cultural and social influences on pain and pain management  - Notify physician/advanced practitioner if interventions unsuccessful or patient reports new pain  Outcome: Progressing     Problem: SAFETY ADULT  Goal: Patient will remain free of falls  Description: INTERVENTIONS:  - Educate patient/family on patient safety including physical limitations  - Instruct patient to call for assistance with activity   - Consult OT/PT to assist with strengthening/mobility   - Keep Call bell within reach  - Keep bed low and locked with side rails adjusted as appropriate  - Keep care items and personal belongings within reach  - Initiate and maintain comfort rounds  - Make Fall Risk Sign visible to staff  - Apply yellow socks and bracelet for high fall risk patients  - Consider moving patient to room near nurses station  Outcome: Progressing  Goal: Maintain or return to baseline ADL function  Description: INTERVENTIONS:  -  Assess patient's ability to carry out ADLs; assess patient's baseline for ADL function and identify physical deficits which impact ability to perform ADLs (bathing, care of mouth/teeth, toileting, grooming, dressing, etc.)  - Assess/evaluate cause of self-care deficits   - Assess range of motion  - Assess patient's mobility; develop plan if impaired  - Assess patient's need for assistive devices and provide as appropriate  - Encourage maximum independence but intervene and supervise when necessary  - Involve family in performance of ADLs  - Assess for home care needs following discharge   - Consider OT consult  to assist with ADL evaluation and planning for discharge  - Provide patient education as appropriate  Outcome: Progressing  Goal: Maintains/Returns to pre admission functional level  Description: INTERVENTIONS:  - Perform AM-PAC 6 Click Basic Mobility/ Daily Activity assessment daily.  - Set and communicate daily mobility goal to care team and patient/family/caregiver.   - Collaborate with rehabilitation services on mobility goals if consulted  - Perform Range of Motion  - Out of bed for toileting  - Record patient progress and toleration of activity level   Outcome: Progressing     Problem: DISCHARGE PLANNING  Goal: Discharge to home or other facility with appropriate resources  Description: INTERVENTIONS:  - Identify barriers to discharge w/patient and caregiver  - Arrange for needed discharge resources and transportation as appropriate  - Identify discharge learning needs (meds, wound care, etc.)  - Arrange for interpretive services to assist at discharge as needed  - Refer to Case Management Department for coordinating discharge planning if the patient needs post-hospital services based on physician/advanced practitioner order or complex needs related to functional status, cognitive ability, or social support system  Outcome: Progressing     Problem: Knowledge Deficit  Goal: Patient/family/caregiver demonstrates understanding of disease process, treatment plan, medications, and discharge instructions  Description: Complete learning assessment and assess knowledge base.  Interventions:  - Provide teaching at level of understanding  - Provide teaching via preferred learning methods  Outcome: Progressing     Problem: Neurological Deficit  Goal: Neurological status is stable or improving  Description: Interventions:  - Monitor and assess patient's level of consciousness, motor function, sensory function, and level of assistance needed for ADLs.   - Monitor and report changes from baseline. Collaborate with  interdisciplinary team to initiate plan and implement interventions as ordered.   - Provide and maintain a safe environment.  - Consider seizure precautions.  - Consider fall precautions.  - Consider aspiration precautions.  - Consider bleeding precautions.  Outcome: Progressing     Problem: Activity Intolerance/Impaired Mobility  Goal: Mobility/activity is maintained at optimum level for patient  Description: Interventions:  - Assess and monitor patient  barriers to mobility and need for assistive/adaptive devices.  - Assess patient's emotional response to limitations.  - Collaborate with interdisciplinary team and initiate plans and interventions as ordered.  - Encourage independent activity per ability.  - Maintain proper body alignment.  - Perform active/passive rom as tolerated/ordered.  - Plan activities to conserve energy.  - Turn patient as appropriate  Outcome: Progressing     Problem: Communication Impairment  Goal: Ability to express needs and understand communication  Description: Assess patient's communication skills and ability to understand information.  Patient will demonstrate use of effective communication techniques, alternative methods of communication and understanding even if not able to speak.     - Encourage communication and provide alternate methods of communication as needed.  - Collaborate with case management/ for discharge needs.  - Include patient/family/caregiver in decisions related to communication.  Outcome: Progressing     Problem: Potential for Aspiration  Goal: Non-ventilated patient's risk of aspiration is minimized  Description: Assess and monitor vital signs, respiratory status, and labs (WBC).  Monitor for signs of aspiration (tachypnea, cough, rales, wheezing, cyanosis, fever).    - Assess and monitor patient's ability to swallow.  - Place patient up in chair to eat if possible.  - HOB up at 90 degrees to eat if unable to get patient up into chair.  -  Supervise patient during oral intake.   - Instruct patient/ family to take small bites.  - Instruct patient/ family to take small single sips when taking liquids.  - Follow patient-specific strategies generated by speech pathologist.  Outcome: Progressing  Goal: Ventilated patient's risk of aspiration is minimized  Description: Assess and monitor vital signs, respiratory status, airway cuff pressure, and labs (WBC).  Monitor for signs of aspiration (tachypnea, cough, rales, wheezing, cyanosis, fever).    - Elevate head of bed 30 degrees if patient has tube feeding.  - Monitor tube feeding.  Outcome: Progressing     Problem: Nutrition  Goal: Nutrition/Hydration status is improving  Description: Monitor and assess patient's nutrition/hydration status for malnutrition (ex- brittle hair, bruises, dry skin, pale skin and conjunctiva, muscle wasting, smooth red tongue, and disorientation). Collaborate with interdisciplinary team and initiate plan and interventions as ordered.  Monitor patient's weight and dietary intake as ordered or per policy. Utilize nutrition screening tool and intervene per policy. Determine patient's food preferences and provide high-protein, high-caloric foods as appropriate.     - Assist patient with eating.  - Allow adequate time for meals.  - Encourage patient to take dietary supplement as ordered.  - Collaborate with clinical nutritionist.  - Include patient/family/caregiver in decisions related to nutrition.  Outcome: Progressing     Problem: CARDIOVASCULAR - ADULT  Goal: Maintains optimal cardiac output and hemodynamic stability  Description: INTERVENTIONS:  - Monitor I/O, vital signs and rhythm  - Monitor for S/S and trends of decreased cardiac output  - Administer and titrate ordered vasoactive medications to optimize hemodynamic stability  - Assess quality of pulses, skin color and temperature  - Assess for signs of decreased coronary artery perfusion  - Instruct patient to report change  in severity of symptoms  Outcome: Progressing  Goal: Absence of cardiac dysrhythmias or at baseline rhythm  Description: INTERVENTIONS:  - Continuous cardiac monitoring, vital signs, obtain 12 lead EKG if ordered  - Administer antiarrhythmic and heart rate control medications as ordered  - Monitor electrolytes and administer replacement therapy as ordered  Outcome: Progressing

## 2024-05-13 NOTE — UTILIZATION REVIEW
Initial Clinical Review    Admission: Date/Time/Statement:   Admission Orders (From admission, onward)       Ordered        05/11/24 1639  INPATIENT ADMISSION  Once                          Orders Placed This Encounter   Procedures    INPATIENT ADMISSION     Standing Status:   Standing     Number of Occurrences:   1     Order Specific Question:   Level of Care     Answer:   Med Surg [16]     Order Specific Question:   Estimated length of stay     Answer:   More than 2 Midnights     Order Specific Question:   Certification     Answer:   I certify that inpatient services are medically necessary for this patient for a duration of greater than two midnights. See H&P and MD Progress Notes for additional information about the patient's course of treatment.     ED Arrival Information       Expected   -    Arrival   5/11/2024 12:26    Acuity   Urgent              Means of arrival   Walk-In    Escorted by   Family Member    Service   Hospitalist    Admission type   Emergency              Arrival complaint   hiccups             Chief Complaint   Patient presents with    Hiccups     X5 days. No other symptoms. C/o chest pressure    Chest Pain       Initial Presentation: 48 y.o. male  with a PMH of hypertension who presents with persistent hiccups x 1 week. The patient also notes decreased appetite for the past week. He has not felt hungry, and has not had a desire to eat. He denies recent illness, recent travel nausea, vomiting, or abdominal pain.  He has a history of hypertension for which he takes 1 medication. He does not remember the name of the medication. He denies noticing any difficulty expressing himself, or word-finding difficulty. Sister notes that he had difficulty expressing himself while trying to answer questions in triage today. This is very unusual for him, because he is highly educated, has a Master's in Accounting, and all of his higher education has been in English so he speaks fluently at baseline. CT  head: Hypodensity in the left basal ganglia concerning for subacute ischemia. Plan: Inpatient admission for evaluation and treatment of CVA, HTN, constipation, hypokalemia: follow MRI brain, TTE, telemetry, follow lipid panel, HgbA1c, start atorvastatin,  mg followed by ASA 81 mg, PT/OT/ST evals, Neurology consult, start Senna and Miralax, monitor BMP.     Anticipated Length of Stay/Certification Statement: Patient will be admitted on an inpatient basis with an anticipated length of stay of greater than 2 midnights secondary to CVA.       ED Triage Vitals   Temperature Pulse Respirations Blood Pressure SpO2   05/11/24 1252 05/11/24 1235 05/11/24 1235 05/11/24 1235 05/11/24 1235   98.9 °F (37.2 °C) 68 18 144/72 99 %      Temp Source Heart Rate Source Patient Position - Orthostatic VS BP Location FiO2 (%)   05/11/24 1252 05/11/24 1235 05/11/24 1235 05/11/24 1235 --   Oral Monitor Sitting Right arm       Pain Score       05/11/24 1731       No Pain          Wt Readings from Last 1 Encounters:   05/13/24 73 kg (161 lb)     Additional Vital Signs:     Date/Time Temp Pulse Resp BP MAP (mmHg) SpO2 O2 Device   05/13/24 08:39:34 97.8 °F (36.6 °C) 69 -- 117/74 88 97 % --   05/13/24 05:32:48 -- 57 -- 120/64 83 99 % --   05/12/24 22:06:28 97.6 °F (36.4 °C) 71 16 127/78 94 99 % --   05/12/24 1506 97.7 °F (36.5 °C) 66 16 152/85 -- 98 % None (Room air)   05/12/24 07:25:05 99.3 °F (37.4 °C) 66 20 152/85 107 99 % --   05/12/24 02:39:19 -- 59 -- 139/82 101 99 % --   05/11/24 21:12:23 98.6 °F (37 °C) 60 16 126/84 98 99 % --   05/11/24 19:15:46 98.6 °F (37 °C) 59 18 139/82 101 99 % --   05/11/24 1905 -- -- -- -- -- 98 % None (Room air)   05/11/24 18:13:12 -- 57 -- 124/80 95 98 % --   05/11/24 1731 99 °F (37.2 °C) 56 18 124/74 94 100 % None (Room air)   05/11/24 1500 -- 59 -- -- -- 100 % --   05/11/24 1432 -- -- -- -- -- -- None (Room air)   05/11/24 1400 -- 62 18 127/75 96 99 % None (Room air)   05/11/24 1252 98.9 °F (37.2 °C)  -- -- -- -- -- --     Pertinent Labs/Diagnostic Test Results:   CTA head and neck w wo contrast   Final Result by Luis Garcia DO (05/12 0339)      Acute/subacute infarct redemonstrated in the left basal ganglia, as described; mildly increased in size compared to the prior. No intracranial hemorrhage identified.      No occlusion, severe stenosis or aneurysm of the major arteries of the head and neck.      No significant stenosis within either internal carotid artery.  Less than 50% stenosis by NASCET criteria.      Patent bilateral vertebral arteries.      Other findings as above.      Clinical follow-up recommended.         Workstation performed: [id number]         MRI brain wo contrast   Final Result by Jonatan Alejandre MD (05/12 6360)      Acute infarct in the left corona radiata extending into the left gangliocapsular region.      I personally discussed this study with MYKE SKINNER on 5/12/2024 5:14 PM.         Workstation performed: [id number]         CT head without contrast   Final Result by Shravan Dennis MD (05/11 1612)      Hypodensity in the left basal ganglia measuring approximately 3.0 x 1.3 cm, concerning for subacute ischemia. MRI recommended.                  Workstation performed: [id number]         XR chest portable   ED Interpretation by Cruz De León DO (05/11 1317)       No acute pathology      Final Result by Rell Kellogg MD (05/11 7577)      No acute cardiopulmonary disease.            Workstation performed: [id number]           5/12 EKG:  Interpretation Summary    Left Ventricle: Wall thickness is normal. The left ventricular ejection fraction is 70%. Systolic function is hyperdynamic. Wall motion is normal. Diastolic function is mildly abnormal, consistent with grade I (abnormal) relaxation.    Right Ventricle: Right ventricular cavity size is normal. Systolic function is normal.    No significant valvular disease.      Results from last 7 days   Lab Units  05/13/24  0528 05/12/24  0527 05/11/24  1330   WBC Thousand/uL 4.92 7.51 6.31   HEMOGLOBIN g/dL 14.5 14.5 13.2   HEMATOCRIT % 43.7 43.3 38.9   PLATELETS Thousands/uL 206 223 206   TOTAL NEUT ABS Thousands/µL 2.23 5.23 3.95         Results from last 7 days   Lab Units 05/13/24  0528 05/12/24  0527 05/11/24  1402   SODIUM mmol/L 137 137 136   POTASSIUM mmol/L 4.5 4.3 3.4*   CHLORIDE mmol/L 103 103 103   CO2 mmol/L 25 27 27   ANION GAP mmol/L 9 7 6   BUN mg/dL 12 12 15   CREATININE mg/dL 1.02 0.96 0.86   EGFR ml/min/1.73sq m 86 93 102   CALCIUM mg/dL 9.5 9.3 8.9   MAGNESIUM mg/dL  --   --  1.9     Results from last 7 days   Lab Units 05/11/24  1402   AST U/L 19   ALT U/L 23   ALK PHOS U/L 71   TOTAL PROTEIN g/dL 6.7   ALBUMIN g/dL 4.0   TOTAL BILIRUBIN mg/dL 0.71         Results from last 7 days   Lab Units 05/13/24  0528 05/12/24  0527 05/11/24  1402   GLUCOSE RANDOM mg/dL 89 92 99         Results from last 7 days   Lab Units 05/12/24  0527   HEMOGLOBIN A1C % 5.0   EAG mg/dl 97           Results from last 7 days   Lab Units 05/11/24  1331   HS TNI 0HR ng/L <2             Results from last 7 days   Lab Units 05/11/24  1402   TSH 3RD GENERATON uIU/mL 1.404           Results from last 7 days   Lab Units 05/11/24  1402   LIPASE u/L 28         ED Treatment:   Medication Administration from 05/11/2024 1226 to 05/11/2024 1744       None          Past Medical History:   Diagnosis Date    Hypertension      Present on Admission:   Cerebrovascular accident (CVA) of left basal ganglia (HCC)   Hypertension   Constipation   Intractable hiccups      Admitting Diagnosis: Chest pain [R07.9]  Hiccups [R06.6]  Stroke-like symptoms [R29.90]  Cerebrovascular accident (CVA), unspecified mechanism (HCC) [I63.9]  Age/Sex: 48 y.o. male  Admission Orders:  Scheduled Medications:  aspirin, 81 mg, Oral, Daily  atorvastatin, 80 mg, Oral, QPM  enoxaparin, 40 mg, Subcutaneous, Q24H WILLIAM  senna, 1 tablet, Oral, Daily      Continuous IV  Infusions:  multi-electrolyte, 75 mL/hr, Intravenous, Continuous      PRN Meds:  acetaminophen, 650 mg, Oral, Q6H PRN  chlorproMAZINE, 25 mg, Oral, Q6H PRN  hydrALAZINE, 10 mg, Intravenous, Q6H PRN  ondansetron, 4 mg, Intravenous, Q6H PRN  polyethylene glycol, 17 g, Oral, Daily PRN        IP CONSULT TO NEUROLOGY  IP CONSULT TO CASE MANAGEMENT  IP CONSULT TO NUTRITION SERVICES    Network Utilization Review Department  ATTENTION: Please call with any questions or concerns to [phone] and carefully listen to the prompts so that you are directed to the right person. All voicemails are confidential.   For Discharge needs, contact Care Management DC Support Team at [phone] opt. 2  Send all requests for admission clinical reviews, approved or denied determinations and any other requests to dedicated fax number below belonging to the campus where the patient is receiving treatment. List of dedicated fax numbers for the Facilities:  FACILITY NAME UR FAX NUMBER   ADMISSION DENIALS (Administrative/Medical Necessity) [phone]   DISCHARGE SUPPORT TEAM (NETWORK) [phone]   PARENT CHILD HEALTH (Maternity/NICU/Pediatrics) [phone]   [facility] [phone]   [facility] [phone]   [facility] [phone]   [facility] [phone]   [facility] [phone]   [facility] [phone]   [facility] [phone]   [facility] [phone]   [facility] [phone]   [facility], [facility] [phone]   [facility] [phone]   [facility] [phone]

## 2024-05-13 NOTE — PROGRESS NOTES
"Progress Note - Neurology   Galdino Gaines 48 y.o. male [id number]  Unit/Bed#: S /S -01    Assessment/Plan:    * Cerebrovascular accident (CVA) of left basal ganglia (HCC)  Assessment & Plan  48 y.o. male with HTN and HLD who has not seen a medical provider in several years who presented to [facility] on 5/11/2024 due to intractable hiccups x 5 days and \"feeling off.\"  He was also noted to have mild aphasia with paraphasic errors.  CT head showed hypodensity in the left basal ganglia concerning for CVA, so the patient was placed on stroke pathway.    MRI brain revealed acute infarct in the left corona radiata extending into the left gangliocapsular region.    CTA head/neck negative for LVO, significant stenosis, or aneurysm.  Echo: EF 70%, grade 1 diastolic relaxation, normal atria size bilaterally  , cholesterol 282, A1C 5.0    Etiology for left basal ganglia infarct remains unclear, but likely due to poorly controlled vascular risk factors.  However, cannot rule out cardioembolic stroke or hypercoagulable state.  While medullary strokes are the most common cause of intractable hiccups, there are some case studies with basal ganglia strokes causing intractable hiccups.    Plan:  - Stroke pathway  Recommend RAYRAY  Recommend outpatient loop recorder  Thrombosis panel in process  Continue Aspirin 81 mg daily (antiplatelet naive PTA)  Continue Atorvastatin 80 mg daily (statin naive PTA)  Continue Thorazine 25 mg Q6 hours PRN hiccups (hiccups have resolved)  Normotensive goal  Euglycemic, normothermic goal  Continue telemetry  PT/OT/ST  Stroke education  Frequent neuro checks. Continue to monitor and notify neurology with any changes.  STAT CT head for any acute change in neuro exam  - Patient will require help in finding a PCP  - Medical management and supportive care per primary team. Correction of any metabolic or infectious disturbances.     Hyperlipidemia  Assessment & Plan  - Lipitor 80 mg " daily    Intractable hiccups  Assessment & Plan  - Patient's hiccups have resolved.  Thorazine PRN.    Hypertension  Assessment & Plan  - Normotensive goal        Galdino Gaines will need follow up in 6-8 weeks with neurovascular attending or AP .  He will require outaptient Zio patch/loop recorder.      Subjective:   Patient seen and examined at bedside.  Patient states that his hiccups resolved last night.  He has not had any further hiccups this morning.  He has some speech impairment with difficulty using words and has paraphasic errors when writing sentences.  He states this is not normal for him.  He has not seen a PCP in several years.  Denies any headache, visual disturbances, numbness, tingling, arm/leg weakness, chest pain, shortness of breath, or abdominal pain.        Past Medical History:   Diagnosis Date    Hypertension      History reviewed. No pertinent surgical history.  History reviewed. No pertinent family history.  Social History     Socioeconomic History    Marital status: Single     Spouse name: None    Number of children: None    Years of education: None    Highest education level: None   Occupational History    None   Tobacco Use    Smoking status: Never     Passive exposure: Never    Smokeless tobacco: Never   Vaping Use    Vaping status: Never Used   Substance and Sexual Activity    Alcohol use: Never    Drug use: Never    Sexual activity: Not Currently   Other Topics Concern    None   Social History Narrative    None     Social Determinants of Health     Financial Resource Strain: Not on file   Food Insecurity: Not on file   Transportation Needs: Not on file   Physical Activity: Not on file   Stress: Not on file   Social Connections: Not on file   Intimate Partner Violence: Not on file   Housing Stability: Not on file     E-Cigarette/Vaping    E-Cigarette Use Never User      E-Cigarette/Vaping Substances    Nicotine No     THC No     CBD No     Flavoring No          Medications:  All  "current active meds have been reviewed and current meds:  Scheduled Meds:  Current Facility-Administered Medications   Medication Dose Route Frequency Provider Last Rate    acetaminophen  650 mg Oral Q6H PRN Shana Weinstein MD      aspirin  81 mg Oral Daily Kwadwo Crowley, DO      atorvastatin  80 mg Oral QPM Kwadwo Crowley, DO      chlorproMAZINE  25 mg Oral Q6H Kwadwo Crowley, DO      enoxaparin  40 mg Subcutaneous Q24H [facility] Shana Weinstein MD      hydrALAZINE  10 mg Intravenous Q6H PRN Shana Weinstein MD      ondansetron  4 mg Intravenous Q6H PRN Shana Weinstein MD      polyethylene glycol  17 g Oral Daily PRN Shana Weinstein MD      senna  1 tablet Oral Daily Shana Weinstein MD       Continuous Infusions:   PRN Meds:.  acetaminophen    hydrALAZINE    ondansetron    polyethylene glycol       ROS:   Review of Systems   Neurological:  Positive for speech difficulty.        Hiccups resolved   All other systems reviewed and are negative.            Vitals:   /64   Pulse 57   Temp 97.6 °F (36.4 °C)   Resp 16   Ht 5' 7\" (1.702 m)   Wt 73 kg (161 lb)   SpO2 99%   BMI 25.22 kg/m²     Physical Exam:   Physical Exam  Vitals and nursing note reviewed.   Constitutional:       Appearance: Normal appearance.      Comments: Patient lying comfortably in bed in no acute distress   HENT:      Head: Normocephalic and atraumatic.      Mouth/Throat:      Mouth: Mucous membranes are moist.      Pharynx: Oropharynx is clear.   Eyes:      Extraocular Movements: Extraocular movements intact.      Conjunctiva/sclera: Conjunctivae normal.      Pupils: Pupils are equal, round, and reactive to light.   Pulmonary:      Effort: Pulmonary effort is normal.   Musculoskeletal:         General: Normal range of motion.   Skin:     General: Skin is warm and dry.   Neurological:      Mental Status: He is alert and oriented to person, place, and time.      Deep Tendon Reflexes:      Reflex Scores:       Bicep reflexes " "are 1+ on the right side and 1+ on the left side.       Brachioradialis reflexes are 1+ on the right side and 1+ on the left side.     Comments: See full neuro exam below       Neurologic Exam     Mental Status   Oriented to person, place, and time.   Patient awake and alert.  Oriented to person.  He knows that he is at a \"[facility],\" but does not know the name of the hospital.  Incorrectly stated that the month is March and the year is 2004.  Mild aphasia.  Patient had difficulty naming some of the objects on the NIH card (cactus, hammock, feather) and made paraphasic errors when reading sentences  No dysarthria  Able to follow simple midline and appendicular commands.     Cranial Nerves     CN III, IV, VI   Pupils are equal, round, and reactive to light.  Difficulty visualizing pupils due to dark irises  EOMs intact without nystagmus.  No visual field deficits.  Facial sensation to light touch intact throughout.  Subtle flattening of right nasolabial fold.  Tongue midline.  Hearing grossly intact.     Motor Exam   Muscle bulk: normal  Overall muscle tone: normal  Right arm pronator drift: absent  Left arm pronator drift: absent  5/5 strength in bilateral upper and lower extremities.     Sensory Exam   Sensation to light touch, temperature, and vibration intact throughout.     Gait, Coordination, and Reflexes     Gait  Gait: (deferred for patient's safety)    Reflexes   Right brachioradialis: 1+  Left brachioradialis: 1+  Right biceps: 1+  Left biceps: 1+  Right patellar reflex grade: diminished.  Left patellar reflex grade: diminished.  Right achilles reflex grade: diminished.  Left achilles reflex grade: diminished.  No ataxia with finger to nose bilaterally  No resting or action tremor  No ankle clonus bilaterally           Labs: I have personally reviewed pertinent reports.   Recent Results (from the past 24 hour(s))   Echo complete w/ contrast if indicated    Collection Time: 05/12/24  3:06 PM   Result " Value Ref Range    RAA A4C 11.9 cm2    LA Volume Index (BP) 19.9 mL/m2    MV Peak A Darren 0.75 m/s    MV stenosis pressure 1/2 time 99 ms    MV Peak E Darren 44 cm/s    E wave deceleration time 341 ms    E/A ratio 0.59     MV valve area p 1/2 method 2.22     RA 2D Volume 25.0 mL    RVID d 3.0 cm    A4C EF 72 %    Left ventricular stroke volume (2D) 65.00 mL    IVSd 1.00 cm    Tricuspid annular plane systolic excursion 2.40 cm    Ao root 2.80 cm    LVPWd 0.80 cm    LA size 2.7 cm    LA volume (BP) 37 mL    FS 47 28 - 44    LVIDS 2.30 cm    IVS 1 cm    LVIDd 4.30 cm    LA length (A2C) 4.80 cm    LEFT VENTRICLE SYSTOLIC VOLUME (MOD BIPLANE) 2D 18 mL    LV DIASTOLIC VOLUME (MOD BIPLANE) 2D 83 mL    Left Atrium Area-systolic Four Chamber 15.2 cm2    Left Atrium Area-systolic Apical Two Chamber 14 cm2    MV E' Tissue Velocity Septal 6 cm/s    LVSV, 2D 65 mL    BSA 1.86 m2    LV EF 70    Basic metabolic panel    Collection Time: 05/13/24  5:28 AM   Result Value Ref Range    Sodium 137 135 - 147 mmol/L    Potassium 4.5 3.5 - 5.3 mmol/L    Chloride 103 96 - 108 mmol/L    CO2 25 21 - 32 mmol/L    ANION GAP 9 4 - 13 mmol/L    BUN 12 5 - 25 mg/dL    Creatinine 1.02 0.60 - 1.30 mg/dL    Glucose 89 65 - 140 mg/dL    Calcium 9.5 8.4 - 10.2 mg/dL    eGFR 86 ml/min/1.73sq m   CBC and differential    Collection Time: 05/13/24  5:28 AM   Result Value Ref Range    WBC 4.92 4.31 - 10.16 Thousand/uL    RBC 4.89 3.88 - 5.62 Million/uL    Hemoglobin 14.5 12.0 - 17.0 g/dL    Hematocrit 43.7 36.5 - 49.3 %    MCV 89 82 - 98 fL    MCH 29.7 26.8 - 34.3 pg    MCHC 33.2 31.4 - 37.4 g/dL    RDW 12.3 11.6 - 15.1 %    MPV 9.4 8.9 - 12.7 fL    Platelets 206 149 - 390 Thousands/uL    nRBC 0 /100 WBCs    Segmented % 46 43 - 75 %    Immature Grans % 0 0 - 2 %    Lymphocytes % 42 14 - 44 %    Monocytes % 8 4 - 12 %    Eosinophils Relative 3 0 - 6 %    Basophils Relative 1 0 - 1 %    Absolute Neutrophils 2.23 1.85 - 7.62 Thousands/µL    Absolute Immature Grans  0.01 0.00 - 0.20 Thousand/uL    Absolute Lymphocytes 2.08 0.60 - 4.47 Thousands/µL    Absolute Monocytes 0.41 0.17 - 1.22 Thousand/µL    Eosinophils Absolute 0.16 0.00 - 0.61 Thousand/µL    Basophils Absolute 0.03 0.00 - 0.10 Thousands/µL       Imaging: I have personally reviewed pertinent imaging in PACS, including CT head, CTA head/neck, MRI brain, Echo,  and I have personally reviewed PACS reports.     EKG, Pathology, and Other Studies: I have personally reviewed pertinent reports.       VTE Prophylaxis: Sequential compression device (Venodyne)  and Enoxaparin (Lovenox)

## 2024-05-13 NOTE — QUICK NOTE
- Notified by nursing that patient was more lethargic  - On my exam noted to have worsening expressive aphasia and following only intermittent commands   - CTA obtained with infarct mildly increased in size  - Touched base with neuro on call and no indication for DAPT

## 2024-05-13 NOTE — SPEECH THERAPY NOTE
Speech-Language Evaluation    Impression:  Moderate receptive and expressive language deficits characterized by difficulty w/ comprehension of yes/no questions, body ID, following multi- step commands and reading comprehension, as well as various naming tasks and written expression. Overall speech is fluent but with word finding difficulty.       Recommendation:  outpt speech/language tx        Current Medical Status:  pt is a 49 yo gentleman admitted to [facility] for L BG CVA.  Per the patient's sister Meli, the patient has been having hiccups for the past week. He has also been more quiet than usual this week, and his appetite has been decreased. He has not complained of headaches, vision changes, dizziness, weakness, gait disturbance, chest pain, or palpations. She notes that he had difficulty expressing himself while trying to answer questions in triage today. This is very unusual for him, because he is highly educated, has a Master's in Accounting, and all of his higher education has been in English so he speaks fluently at baseline. She has not noticed any difficulty with coordination or balance.  The patient does not have any history of blood clots, hypercoagulability, previous strokes, autoimmune disease, or arrhythmias         Past Medical History:  See H&P for details      Special Studies:   MRI: 5/12/24 Acute infarct in the left corona radiata extending into the left gangliocapsular region.     CXR: 5/11/24 No acute cardiopulmonary disease.       Social/Educational/vocational Hx:   Lives w/ family; has a master's degree in accounting        Language Evaluation:  often repeated question several times and needed extended length to respond.     Auditory Comprehension:  R/L discrim: 100%  Body part ID: 60%  One step commands: 100%  Two step commands: 40%   Complex or 3 step commands: NA  Basic y/n ?'s:60%  Complex Y/n ?'s: 60%  Paragraph Comprehension: NA    Reading Comprehension:  Sentence  comprehension:60%  Paragraph comprehension: NA  Functional comprehension: NA    Verbal Expression:  Auto Sequences:   ADELINE: 100%   ANISH: 100%   Counting to 21: 100%   Repetition: 100 %  Phrase Completion:40%  Responsive Namin%  Picture Namin% w/ self cueing  Picture Description: fluent, appropriate descriptions w/ semantic errors  Conversation: limited discourse, some word finding difficulty noted.   Able to make basic needs known? yes    Written Expression:  Name: +  Address:partial  Sentence to dictation: NA  Sentence formulation: extended length of time needed, spelling error, wrote sent x2      Motor Speech:  WNL, accent noted  Dysarthria:   Imprecise artic:   Rate:   Nasality:   Breath support:   Volume:  Apraxia:   Oral:   Verbal:      Cognitive -linguistic skills:  will defer to OT for cog eval.       Results discussed with:  patient, neurology service, and OT      Jennifer Sevilla MA CCC-SLP  Speech Pathologist  [place] license # [id number]  [place] license # [id number]  Available via [employer]

## 2024-05-13 NOTE — ASSESSMENT & PLAN NOTE
Possibly related to CVA  Thorazine started 5/12, with resolution of hiccups, however may have contributed to episode of lethargy overnight 5/12-5/13    Plan:  Change Thorazine from scheduled to PRN

## 2024-05-13 NOTE — PLAN OF CARE
Problem: PAIN - ADULT  Goal: Verbalizes/displays adequate comfort level or baseline comfort level  Description: Interventions:  - Encourage patient to monitor pain and request assistance  - Assess pain using appropriate pain scale  - Administer analgesics based on type and severity of pain and evaluate response  - Implement non-pharmacological measures as appropriate and evaluate response  - Consider cultural and social influences on pain and pain management  - Notify physician/advanced practitioner if interventions unsuccessful or patient reports new pain  Outcome: Progressing     Problem: SAFETY ADULT  Goal: Patient will remain free of falls  Description: INTERVENTIONS:  - Educate patient/family on patient safety including physical limitations  - Instruct patient to call for assistance with activity   - Consult OT/PT to assist with strengthening/mobility   - Keep Call bell within reach  - Keep bed low and locked with side rails adjusted as appropriate  - Keep care items and personal belongings within reach  - Initiate and maintain comfort rounds  - Make Fall Risk Sign visible to staff  - Offer Toileting every  Hours, in advance of need  - Initiate/Maintain alarm  - Obtain necessary fall risk management equipment:   - Apply yellow socks and bracelet for high fall risk patients  - Consider moving patient to room near nurses station  Outcome: Progressing  Goal: Maintain or return to baseline ADL function  Description: INTERVENTIONS:  -  Assess patient's ability to carry out ADLs; assess patient's baseline for ADL function and identify physical deficits which impact ability to perform ADLs (bathing, care of mouth/teeth, toileting, grooming, dressing, etc.)  - Assess/evaluate cause of self-care deficits   - Assess range of motion  - Assess patient's mobility; develop plan if impaired  - Assess patient's need for assistive devices and provide as appropriate  - Encourage maximum independence but intervene and supervise  when necessary  - Involve family in performance of ADLs  - Assess for home care needs following discharge   - Consider OT consult to assist with ADL evaluation and planning for discharge  - Provide patient education as appropriate  Outcome: Progressing  Goal: Maintains/Returns to pre admission functional level  Description: INTERVENTIONS:  - Perform AM-PAC 6 Click Basic Mobility/ Daily Activity assessment daily.  - Set and communicate daily mobility goal to care team and patient/family/caregiver.   - Collaborate with rehabilitation services on mobility goals if consulted  - Perform Range of Motion  times a day.  - Reposition patient every  hours.  - Dangle patient  times a day  - Stand patient  times a day  - Ambulate patient  times a day  - Out of bed to chair  times a day   - Out of bed for meals  times a day  - Out of bed for toileting  - Record patient progress and toleration of activity level   Outcome: Progressing     Problem: DISCHARGE PLANNING  Goal: Discharge to home or other facility with appropriate resources  Description: INTERVENTIONS:  - Identify barriers to discharge w/patient and caregiver  - Arrange for needed discharge resources and transportation as appropriate  - Identify discharge learning needs (meds, wound care, etc.)  - Arrange for interpretive services to assist at discharge as needed  - Refer to Case Management Department for coordinating discharge planning if the patient needs post-hospital services based on physician/advanced practitioner order or complex needs related to functional status, cognitive ability, or social support system  Outcome: Progressing     Problem: Knowledge Deficit  Goal: Patient/family/caregiver demonstrates understanding of disease process, treatment plan, medications, and discharge instructions  Description: Complete learning assessment and assess knowledge base.  Interventions:  - Provide teaching at level of understanding  - Provide teaching via preferred  learning methods  Outcome: Progressing     Problem: Neurological Deficit  Goal: Neurological status is stable or improving  Description: Interventions:  - Monitor and assess patient's level of consciousness, motor function, sensory function, and level of assistance needed for ADLs.   - Monitor and report changes from baseline. Collaborate with interdisciplinary team to initiate plan and implement interventions as ordered.   - Provide and maintain a safe environment.  - Consider seizure precautions.  - Consider fall precautions.  - Consider aspiration precautions.  - Consider bleeding precautions.  Outcome: Progressing     Problem: Activity Intolerance/Impaired Mobility  Goal: Mobility/activity is maintained at optimum level for patient  Description: Interventions:  - Assess and monitor patient  barriers to mobility and need for assistive/adaptive devices.  - Assess patient's emotional response to limitations.  - Collaborate with interdisciplinary team and initiate plans and interventions as ordered.  - Encourage independent activity per ability.  - Maintain proper body alignment.  - Perform active/passive rom as tolerated/ordered.  - Plan activities to conserve energy.  - Turn patient as appropriate  Outcome: Progressing     Problem: Communication Impairment  Goal: Ability to express needs and understand communication  Description: Assess patient's communication skills and ability to understand information.  Patient will demonstrate use of effective communication techniques, alternative methods of communication and understanding even if not able to speak.     - Encourage communication and provide alternate methods of communication as needed.  - Collaborate with case management/ for discharge needs.  - Include patient/family/caregiver in decisions related to communication.  Outcome: Progressing     Problem: Potential for Aspiration  Goal: Non-ventilated patient's risk of aspiration is  minimized  Description: Assess and monitor vital signs, respiratory status, and labs (WBC).  Monitor for signs of aspiration (tachypnea, cough, rales, wheezing, cyanosis, fever).    - Assess and monitor patient's ability to swallow.  - Place patient up in chair to eat if possible.  - HOB up at 90 degrees to eat if unable to get patient up into chair.  - Supervise patient during oral intake.   - Instruct patient/ family to take small bites.  - Instruct patient/ family to take small single sips when taking liquids.  - Follow patient-specific strategies generated by speech pathologist.  Outcome: Progressing  Goal: Ventilated patient's risk of aspiration is minimized  Description: Assess and monitor vital signs, respiratory status, airway cuff pressure, and labs (WBC).  Monitor for signs of aspiration (tachypnea, cough, rales, wheezing, cyanosis, fever).    - Elevate head of bed 30 degrees if patient has tube feeding.  - Monitor tube feeding.  Outcome: Progressing     Problem: Nutrition  Goal: Nutrition/Hydration status is improving  Description: Monitor and assess patient's nutrition/hydration status for malnutrition (ex- brittle hair, bruises, dry skin, pale skin and conjunctiva, muscle wasting, smooth red tongue, and disorientation). Collaborate with interdisciplinary team and initiate plan and interventions as ordered.  Monitor patient's weight and dietary intake as ordered or per policy. Utilize nutrition screening tool and intervene per policy. Determine patient's food preferences and provide high-protein, high-caloric foods as appropriate.     - Assist patient with eating.  - Allow adequate time for meals.  - Encourage patient to take dietary supplement as ordered.  - Collaborate with clinical nutritionist.  - Include patient/family/caregiver in decisions related to nutrition.  Outcome: Progressing     Problem: CARDIOVASCULAR - ADULT  Goal: Maintains optimal cardiac output and hemodynamic stability  Description:  INTERVENTIONS:  - Monitor I/O, vital signs and rhythm  - Monitor for S/S and trends of decreased cardiac output  - Administer and titrate ordered vasoactive medications to optimize hemodynamic stability  - Assess quality of pulses, skin color and temperature  - Assess for signs of decreased coronary artery perfusion  - Instruct patient to report change in severity of symptoms  Outcome: Progressing  Goal: Absence of cardiac dysrhythmias or at baseline rhythm  Description: INTERVENTIONS:  - Continuous cardiac monitoring, vital signs, obtain 12 lead EKG if ordered  - Administer antiarrhythmic and heart rate control medications as ordered  - Monitor electrolytes and administer replacement therapy as ordered  Outcome: Progressing

## 2024-05-13 NOTE — ASSESSMENT & PLAN NOTE
Blood Pressure: 117/74  Patient reports history of hypertension, is on 1 medication at home but does not recall the name.  The patient's stroke symptoms have been going on for 1 week. Do not need to allow for permissive HTN.     Plan:  Monitor blood pressure.   BP Goal: Normotension (-160)  Start IV fluids, as BP has been below goal  PRN Hydralazine for SBP >160

## 2024-05-14 ENCOUNTER — ANESTHESIA EVENT (INPATIENT)
Dept: NON INVASIVE DIAGNOSTICS | Facility: HOSPITAL | Age: 49
DRG: 045 | End: 2024-05-14
Payer: COMMERCIAL

## 2024-05-14 ENCOUNTER — TELEPHONE (OUTPATIENT)
Dept: CARDIOLOGY CLINIC | Facility: CLINIC | Age: 49
End: 2024-05-14

## 2024-05-14 VITALS
WEIGHT: 160 LBS | HEIGHT: 67 IN | SYSTOLIC BLOOD PRESSURE: 126 MMHG | TEMPERATURE: 98.7 F | HEART RATE: 63 BPM | RESPIRATION RATE: 16 BRPM | DIASTOLIC BLOOD PRESSURE: 84 MMHG | OXYGEN SATURATION: 97 % | BODY MASS INDEX: 25.11 KG/M2

## 2024-05-14 PROBLEM — K59.00 CONSTIPATION: Status: RESOLVED | Noted: 2024-05-11 | Resolved: 2024-05-14

## 2024-05-14 PROBLEM — R06.6 INTRACTABLE HICCUPS: Status: RESOLVED | Noted: 2024-05-12 | Resolved: 2024-05-14

## 2024-05-14 LAB
APTT SCREEN TO CONFIRM RATIO: 1.14 RATIO (ref 0–1.34)
BASOPHILS # BLD AUTO: 0.04 THOUSANDS/ÂΜL (ref 0–0.1)
BASOPHILS NFR BLD AUTO: 1 % (ref 0–1)
CONFIRM APTT/NORMAL: 34 SEC (ref 0–47.6)
EOSINOPHIL # BLD AUTO: 0.14 THOUSAND/ÂΜL (ref 0–0.61)
EOSINOPHIL NFR BLD AUTO: 3 % (ref 0–6)
ERYTHROCYTE [DISTWIDTH] IN BLOOD BY AUTOMATED COUNT: 12.3 % (ref 11.6–15.1)
HCT VFR BLD AUTO: 40 % (ref 36.5–49.3)
HGB BLD-MCNC: 13.6 G/DL (ref 12–17)
IMM GRANULOCYTES # BLD AUTO: 0.01 THOUSAND/UL (ref 0–0.2)
IMM GRANULOCYTES NFR BLD AUTO: 0 % (ref 0–2)
LA PPP-IMP: NORMAL
LYMPHOCYTES # BLD AUTO: 1.66 THOUSANDS/ÂΜL (ref 0.6–4.47)
LYMPHOCYTES NFR BLD AUTO: 35 % (ref 14–44)
MCH RBC QN AUTO: 29.6 PG (ref 26.8–34.3)
MCHC RBC AUTO-ENTMCNC: 34 G/DL (ref 31.4–37.4)
MCV RBC AUTO: 87 FL (ref 82–98)
MONOCYTES # BLD AUTO: 0.36 THOUSAND/ÂΜL (ref 0.17–1.22)
MONOCYTES NFR BLD AUTO: 8 % (ref 4–12)
NEUTROPHILS # BLD AUTO: 2.54 THOUSANDS/ÂΜL (ref 1.85–7.62)
NEUTS SEG NFR BLD AUTO: 53 % (ref 43–75)
NRBC BLD AUTO-RTO: 0 /100 WBCS
PLATELET # BLD AUTO: 214 THOUSANDS/UL (ref 149–390)
PMV BLD AUTO: 9.4 FL (ref 8.9–12.7)
PROT S ACT/NOR PPP: 90 % (ref 61–136)
PROT S PPP-ACNC: 109 % (ref 60–150)
RBC # BLD AUTO: 4.6 MILLION/UL (ref 3.88–5.62)
SCREEN APTT: 39.8 SEC (ref 0–43.5)
SCREEN DRVVT: 34.1 SEC (ref 0–47)
SL CV LV EF: 65
THROMBIN TIME: 17.6 SEC (ref 0–23)
WBC # BLD AUTO: 4.75 THOUSAND/UL (ref 4.31–10.16)

## 2024-05-14 PROCEDURE — 85025 COMPLETE CBC W/AUTO DIFF WBC: CPT

## 2024-05-14 PROCEDURE — 93325 DOPPLER ECHO COLOR FLOW MAPG: CPT | Performed by: INTERNAL MEDICINE

## 2024-05-14 PROCEDURE — 93312 ECHO TRANSESOPHAGEAL: CPT | Performed by: INTERNAL MEDICINE

## 2024-05-14 PROCEDURE — 99239 HOSP IP/OBS DSCHRG MGMT >30: CPT | Performed by: INTERNAL MEDICINE

## 2024-05-14 PROCEDURE — 93312 ECHO TRANSESOPHAGEAL: CPT

## 2024-05-14 PROCEDURE — 97530 THERAPEUTIC ACTIVITIES: CPT

## 2024-05-14 PROCEDURE — 93320 DOPPLER ECHO COMPLETE: CPT | Performed by: INTERNAL MEDICINE

## 2024-05-14 PROCEDURE — 99232 SBSQ HOSP IP/OBS MODERATE 35: CPT | Performed by: PHYSICIAN ASSISTANT

## 2024-05-14 PROCEDURE — B24BZZ4 ULTRASONOGRAPHY OF HEART WITH AORTA, TRANSESOPHAGEAL: ICD-10-PCS | Performed by: INTERNAL MEDICINE

## 2024-05-14 PROCEDURE — 99254 IP/OBS CNSLTJ NEW/EST MOD 60: CPT | Performed by: INTERNAL MEDICINE

## 2024-05-14 RX ORDER — ATORVASTATIN CALCIUM 80 MG/1
80 TABLET, FILM COATED ORAL EVERY EVENING
Qty: 30 TABLET | Refills: 0 | Status: SHIPPED | OUTPATIENT
Start: 2024-05-14 | End: 2024-05-21 | Stop reason: SDUPTHER

## 2024-05-14 RX ORDER — PROPOFOL 10 MG/ML
INJECTION, EMULSION INTRAVENOUS CONTINUOUS PRN
Status: DISCONTINUED | OUTPATIENT
Start: 2024-05-14 | End: 2024-05-14

## 2024-05-14 RX ORDER — PROPOFOL 10 MG/ML
INJECTION, EMULSION INTRAVENOUS AS NEEDED
Status: DISCONTINUED | OUTPATIENT
Start: 2024-05-14 | End: 2024-05-14

## 2024-05-14 RX ORDER — SODIUM CHLORIDE, SODIUM LACTATE, POTASSIUM CHLORIDE, CALCIUM CHLORIDE 600; 310; 30; 20 MG/100ML; MG/100ML; MG/100ML; MG/100ML
INJECTION, SOLUTION INTRAVENOUS CONTINUOUS PRN
Status: DISCONTINUED | OUTPATIENT
Start: 2024-05-14 | End: 2024-05-14

## 2024-05-14 RX ADMIN — PROPOFOL 80 MG: 10 INJECTION, EMULSION INTRAVENOUS at 12:07

## 2024-05-14 RX ADMIN — PROPOFOL 100 MCG/KG/MIN: 10 INJECTION, EMULSION INTRAVENOUS at 12:10

## 2024-05-14 RX ADMIN — ASPIRIN 81 MG: 81 TABLET, COATED ORAL at 08:27

## 2024-05-14 RX ADMIN — ENOXAPARIN SODIUM 40 MG: 40 INJECTION SUBCUTANEOUS at 08:27

## 2024-05-14 RX ADMIN — SENNOSIDES 8.6 MG: 8.6 TABLET, FILM COATED ORAL at 08:27

## 2024-05-14 RX ADMIN — PROPOFOL 20 MG: 10 INJECTION, EMULSION INTRAVENOUS at 12:10

## 2024-05-14 RX ADMIN — SODIUM CHLORIDE, SODIUM LACTATE, POTASSIUM CHLORIDE, AND CALCIUM CHLORIDE: .6; .31; .03; .02 INJECTION, SOLUTION INTRAVENOUS at 11:34

## 2024-05-14 NOTE — PLAN OF CARE
Problem: PAIN - ADULT  Goal: Verbalizes/displays adequate comfort level or baseline comfort level  Description: Interventions:  - Encourage patient to monitor pain and request assistance  - Assess pain using appropriate pain scale  - Administer analgesics based on type and severity of pain and evaluate response  - Implement non-pharmacological measures as appropriate and evaluate response  - Consider cultural and social influences on pain and pain management  - Notify physician/advanced practitioner if interventions unsuccessful or patient reports new pain  Outcome: Progressing     Problem: SAFETY ADULT  Goal: Patient will remain free of falls  Description: INTERVENTIONS:  - Educate patient/family on patient safety including physical limitations  - Instruct patient to call for assistance with activity   - Consult OT/PT to assist with strengthening/mobility   - Keep Call bell within reach  - Keep bed low and locked with side rails adjusted as appropriate  - Keep care items and personal belongings within reach  - Initiate and maintain comfort rounds  - Make Fall Risk Sign visible to staff  - Offer Toileting every 2 Hours, in advance of need  - Initiate/Maintain bed and cahir alarm  - Obtain necessary fall risk management equipment:   - Apply yellow socks and bracelet for high fall risk patients  - Consider moving patient to room near nurses station  Outcome: Progressing  Goal: Maintain or return to baseline ADL function  Description: INTERVENTIONS:  -  Assess patient's ability to carry out ADLs; assess patient's baseline for ADL function and identify physical deficits which impact ability to perform ADLs (bathing, care of mouth/teeth, toileting, grooming, dressing, etc.)  - Assess/evaluate cause of self-care deficits   - Assess range of motion  - Assess patient's mobility; develop plan if impaired  - Assess patient's need for assistive devices and provide as appropriate  - Encourage maximum independence but  intervene and supervise when necessary  - Involve family in performance of ADLs  - Assess for home care needs following discharge   - Consider OT consult to assist with ADL evaluation and planning for discharge  - Provide patient education as appropriate  Outcome: Progressing  Goal: Maintains/Returns to pre admission functional level  Description: INTERVENTIONS:  - Perform AM-PAC 6 Click Basic Mobility/ Daily Activity assessment daily.  - Set and communicate daily mobility goal to care team and patient/family/caregiver.   - Collaborate with rehabilitation services on mobility goals if consulted  - Perform Range of Motion 3 times a day.  - Reposition patient every 2 hours.  - Dangle patient 3 times a day  - Stand patient 3 times a day  - Ambulate patient 3 times a day  - Out of bed to chair 3 times a day   - Out of bed for meals 3 times a day  - Out of bed for toileting  - Record patient progress and toleration of activity level   Outcome: Progressing     Problem: DISCHARGE PLANNING  Goal: Discharge to home or other facility with appropriate resources  Description: INTERVENTIONS:  - Identify barriers to discharge w/patient and caregiver  - Arrange for needed discharge resources and transportation as appropriate  - Identify discharge learning needs (meds, wound care, etc.)  - Arrange for interpretive services to assist at discharge as needed  - Refer to Case Management Department for coordinating discharge planning if the patient needs post-hospital services based on physician/advanced practitioner order or complex needs related to functional status, cognitive ability, or social support system  Outcome: Progressing     Problem: Knowledge Deficit  Goal: Patient/family/caregiver demonstrates understanding of disease process, treatment plan, medications, and discharge instructions  Description: Complete learning assessment and assess knowledge base.  Interventions:  - Provide teaching at level of understanding  - Provide  teaching via preferred learning methods  Outcome: Progressing     Problem: Neurological Deficit  Goal: Neurological status is stable or improving  Description: Interventions:  - Monitor and assess patient's level of consciousness, motor function, sensory function, and level of assistance needed for ADLs.   - Monitor and report changes from baseline. Collaborate with interdisciplinary team to initiate plan and implement interventions as ordered.   - Provide and maintain a safe environment.  - Consider seizure precautions.  - Consider fall precautions.  - Consider aspiration precautions.  - Consider bleeding precautions.  Outcome: Progressing     Problem: Activity Intolerance/Impaired Mobility  Goal: Mobility/activity is maintained at optimum level for patient  Description: Interventions:  - Assess and monitor patient  barriers to mobility and need for assistive/adaptive devices.  - Assess patient's emotional response to limitations.  - Collaborate with interdisciplinary team and initiate plans and interventions as ordered.  - Encourage independent activity per ability.  - Maintain proper body alignment.  - Perform active/passive rom as tolerated/ordered.  - Plan activities to conserve energy.  - Turn patient as appropriate  Outcome: Progressing     Problem: Communication Impairment  Goal: Ability to express needs and understand communication  Description: Assess patient's communication skills and ability to understand information.  Patient will demonstrate use of effective communication techniques, alternative methods of communication and understanding even if not able to speak.     - Encourage communication and provide alternate methods of communication as needed.  - Collaborate with case management/ for discharge needs.  - Include patient/family/caregiver in decisions related to communication.  Outcome: Progressing     Problem: Potential for Aspiration  Goal: Non-ventilated patient's risk of  aspiration is minimized  Description: Assess and monitor vital signs, respiratory status, and labs (WBC).  Monitor for signs of aspiration (tachypnea, cough, rales, wheezing, cyanosis, fever).    - Assess and monitor patient's ability to swallow.  - Place patient up in chair to eat if possible.  - HOB up at 90 degrees to eat if unable to get patient up into chair.  - Supervise patient during oral intake.   - Instruct patient/ family to take small bites.  - Instruct patient/ family to take small single sips when taking liquids.  - Follow patient-specific strategies generated by speech pathologist.  Outcome: Progressing  Goal: Ventilated patient's risk of aspiration is minimized  Description: Assess and monitor vital signs, respiratory status, airway cuff pressure, and labs (WBC).  Monitor for signs of aspiration (tachypnea, cough, rales, wheezing, cyanosis, fever).    - Elevate head of bed 30 degrees if patient has tube feeding.  - Monitor tube feeding.  Outcome: Progressing     Problem: Nutrition  Goal: Nutrition/Hydration status is improving  Description: Monitor and assess patient's nutrition/hydration status for malnutrition (ex- brittle hair, bruises, dry skin, pale skin and conjunctiva, muscle wasting, smooth red tongue, and disorientation). Collaborate with interdisciplinary team and initiate plan and interventions as ordered.  Monitor patient's weight and dietary intake as ordered or per policy. Utilize nutrition screening tool and intervene per policy. Determine patient's food preferences and provide high-protein, high-caloric foods as appropriate.     - Assist patient with eating.  - Allow adequate time for meals.  - Encourage patient to take dietary supplement as ordered.  - Collaborate with clinical nutritionist.  - Include patient/family/caregiver in decisions related to nutrition.  Outcome: Progressing     Problem: CARDIOVASCULAR - ADULT  Goal: Maintains optimal cardiac output and hemodynamic  stability  Description: INTERVENTIONS:  - Monitor I/O, vital signs and rhythm  - Monitor for S/S and trends of decreased cardiac output  - Administer and titrate ordered vasoactive medications to optimize hemodynamic stability  - Assess quality of pulses, skin color and temperature  - Assess for signs of decreased coronary artery perfusion  - Instruct patient to report change in severity of symptoms  Outcome: Progressing  Goal: Absence of cardiac dysrhythmias or at baseline rhythm  Description: INTERVENTIONS:  - Continuous cardiac monitoring, vital signs, obtain 12 lead EKG if ordered  - Administer antiarrhythmic and heart rate control medications as ordered  - Monitor electrolytes and administer replacement therapy as ordered  Outcome: Progressing     Problem: Nutrition/Hydration-ADULT  Goal: Nutrient/Hydration intake appropriate for improving, restoring or maintaining nutritional needs  Description: Monitor and assess patient's nutrition/hydration status for malnutrition. Collaborate with interdisciplinary team and initiate plan and interventions as ordered.  Monitor patient's weight and dietary intake as ordered or per policy. Utilize nutrition screening tool and intervene as necessary. Determine patient's food preferences and provide high-protein, high-caloric foods as appropriate.     INTERVENTIONS:  - Monitor oral intake, urinary output, labs, and treatment plans  - Assess nutrition and hydration status and recommend course of action  - Evaluate amount of meals eaten  - Assist patient with eating if necessary   - Allow adequate time for meals  - Recommend/ encourage appropriate diets, oral nutritional supplements, and vitamin/mineral supplements  - Order, calculate, and assess calorie counts as needed  - Recommend, monitor, and adjust tube feedings and TPN/PPN based on assessed needs  - Assess need for intravenous fluids  - Provide specific nutrition/hydration education as appropriate  - Include  patient/family/caregiver in decisions related to nutrition  Outcome: Progressing

## 2024-05-14 NOTE — ASSESSMENT & PLAN NOTE
Presented with 1 week of persistent hiccups.   Expressive aphasia and impaired finger-to-nose noted on admission exam  CT head: Hypodensity in the left basal ganglia concerning for subacute ischemia  No TNK or stroke alert in the ED due to symptoms being present for 1 week  At baseline patient is highly educated (Masters degree) and has no difficulty expressing himself fluently in English  TSH, HbA1c within normal limits  Risk factor for CVA: Hypertension, Severe hyperlipidemia  CTA: No occlusion or stenosis of the major vessels  MRI brain: Acute infarct in the corona radiata, extending to the left gangliocapsular region  TTE: LVEF 70%, G1DD. Bubble study not performed  RAYRAY was within normal limits, no PFO  The patient is cleared for discharge from a neurology and cardiology standpoint  Stable for discharge home today    Plan:  Thrombosis panel pending - will require PCP follow up   Atorvastatin 80 mg daily and aspirin 81 mg daily, prescriptions sent to pharmacy  Cardiology follow up with Moncho Engle  Neurology follow up in 6-8 weeks  Speech Therapy location list provided  Monitor BP at home to bring to PCP visit. Sister confirmed they have BP monitor at home.   Establish care with PCP within 1 week - referral to [facility] provided

## 2024-05-14 NOTE — ASSESSMENT & PLAN NOTE
Possibly related to CVA  Thorazine started 5/12, with resolution of hiccups, however may have contributed to episode of lethargy overnight 5/12-5/13  Thorazine changed from scheduled to PRN  Hiccups resolved

## 2024-05-14 NOTE — DISCHARGE SUMMARY
Formerly [facility]  Discharge- Galdino Gaines 1975, 48 y.o. male MRN: [id number]  Unit/Bed#: S -01 Encounter: [id number]  Primary Care Provider: No primary care provider on file.   Date and time admitted to hospital: 5/11/2024 12:29 PM    * Cerebrovascular accident (CVA) of left basal ganglia (HCC)  Assessment & Plan  Presented with 1 week of persistent hiccups.   Expressive aphasia and impaired finger-to-nose noted on admission exam  CT head: Hypodensity in the left basal ganglia concerning for subacute ischemia  No TNK or stroke alert in the ED due to symptoms being present for 1 week  At baseline patient is highly educated (Masters degree) and has no difficulty expressing himself fluently in English  TSH, HbA1c within normal limits  Risk factor for CVA: Hypertension, Severe hyperlipidemia  CTA: No occlusion or stenosis of the major vessels  MRI brain: Acute infarct in the corona radiata, extending to the left gangliocapsular region  TTE: LVEF 70%, G1DD. Bubble study not performed  RAYRAY was within normal limits, no PFO  The patient is cleared for discharge from a neurology and cardiology standpoint  Stable for discharge home today    Plan:  Thrombosis panel pending - will require PCP follow up   Atorvastatin 80 mg daily and aspirin 81 mg daily, prescriptions sent to pharmacy  Cardiology follow up with Moncho Engle  Neurology follow up in 6-8 weeks  Speech Therapy location list provided  Monitor BP at home to bring to PCP visit. Sister confirmed they have BP monitor at home.   Establish care with PCP within 1 week - referral to [facility] provided        Hypertension  Assessment & Plan  Blood Pressure: 126/84  Patient reports history of hypertension, is on 1 medication at home but does not recall the name.  The patient's stroke symptoms have been going on for 1 week. Do not need to allow for permissive HTN.     Plan:  Blood pressure well controlled without antihypertensives while  inpatient  Monitor BP at home to bring to PCP visit. Sister confirmed they have BP monitor at home.   Establish care with PCP within 1 week - referral to [facility] provided    Hyperlipidemia  Assessment & Plan  Lab Results   Component Value Date    CHOLESTEROL 282 (H) 05/12/2024    TRIG 88 05/12/2024    HDL 59 05/12/2024    LDLCALC 205 (H) 05/12/2024     Atorvastatin 80 mg daily, outpatient follow up with PCP    Intractable hiccups-resolved as of 5/14/2024  Assessment & Plan  Possibly related to CVA  Thorazine started 5/12, with resolution of hiccups, however may have contributed to episode of lethargy overnight 5/12-5/13  Thorazine changed from scheduled to PRN  Hiccups resolved       Constipation-resolved as of 5/14/2024  Assessment & Plan  Patient reports last BM 2-3 days PTA    Plan:  Senna 8.6mg daily  Miralax daily PRN        Medical Problems       Resolved Problems  Date Reviewed: 5/12/2024            Resolved    Hypokalemia 5/12/2024     Resolved by  Kwadwo Crowley DO    Constipation 5/14/2024     Resolved by  Shana Weinstein MD    Intractable hiccups 5/14/2024     Resolved by  Shana Weinstein MD        Discharging Resident: Shaan Weinstein MD  Discharging Attending: Nadia Medrano MD  PCP: No primary care provider on file.  Admission Date:   Admission Orders (From admission, onward)       Ordered        05/11/24 1639  INPATIENT ADMISSION  Once                          Discharge Date: 05/14/24    Consultations During Hospital Stay:  Neurology  Cardiology    Procedures Performed:   TTE  RAYRAY    Significant Findings / Test Results:   CTA head and neck w wo contrast  Result Date: 5/12/2024  Impression: Acute/subacute infarct redemonstrated in the left basal ganglia, as described; mildly increased in size compared to the prior. No intracranial hemorrhage identified. No occlusion, severe stenosis or aneurysm of the major arteries of the head and neck. No significant stenosis within either internal  carotid artery.  Less than 50% stenosis by NASCET criteria. Patent bilateral vertebral arteries. Other findings as above. Clinical follow-up recommended. Workstation performed: [id number]     MRI brain wo contrast  Result Date: 5/12/2024  Impression: Acute infarct in the left corona radiata extending into the left gangliocapsular region. I personally discussed this study with MYKE SKINNER on 5/12/2024 5:14 PM. Workstation performed: [id number]     XR chest portable  Result Date: 5/11/2024  Impression: No acute cardiopulmonary disease. Workstation performed: [id number]     CT head without contrast  Result Date: 5/11/2024  Impression: Hypodensity in the left basal ganglia measuring approximately 3.0 x 1.3 cm, concerning for subacute ischemia. MRI recommended. Workstation performed: [id number]     Transthoracic Echo 5/12/2024    Left Ventricle: Wall thickness is normal. The left ventricular ejection fraction is 70%. Systolic function is hyperdynamic. Wall motion is normal. Diastolic function is mildly abnormal, consistent with grade I (abnormal) relaxation.    Right Ventricle: Right ventricular cavity size is normal. Systolic function is normal.    No significant valvular disease.    Incidental Findings:   None    Test Results Pending at Discharge (will require follow up):  Hypercoagulability workup - will require PCP follow up      Outpatient Tests Requested:  Moncho Patch to be coordinated by Cardiology    Complications:  None    Reason for Admission: Subacute CVA of the left corona radiata    Hospital Course:   Galdino Gaines is a 48 y.o. male patient who originally presented to the hospital on 5/11/2024 due to a one week history of intractable hiccups. The patient is originally from [facility], and is here visiting his sister who lives locally. On admission, the patient was noted to have significant expressive and receptive aphasia, as well as impaired finger-to-nose and rapid alternating movements on exam. According to  "his sister, he is well educated, having received his master degree and higher education in English, yet upon admission was having difficulty communicating his thoughts and had been quiet for a few days prior. No stroke alert was called, as symptoms has been going on for 1 week.  CT showed subacute basal ganglia infarct, and MRI head confirmed infarct in the left corona radiata extending to basal ganglia. Additionally, lipid blood panel showed elevated cholesterol (282) and LDL (205). Patient was then started on Asprin and Atorvastatin. Thorazine PRN was also given for the hiccups, which resolved. Symptoms have improved throughout the course of his hospital stay, but he is not back to baseline.  Echocardiogram showed no abnormalities and ECG showed normal sinus rhythm. RAYRAY was also within normal limits. Cardiology recommends follow up with o patch for further monitoring. He will need to follow up with neurovascular in 6-8 weeks. He will be discharged on aspirin and atorvastatin 81mg daily.  Hypercoagulability workup is pending on discharge and will need PCP follow up. He was referred to the [place] [facility] for follow up. Per case management, speech therapy outpatient will be self-pay. A list of therapy locations providing speech therapy, with phone numbers, was provided. The patient is stable for discharge home today.     Please see above list of diagnoses and related plan for additional information.     Condition at Discharge: good    Discharge Day Visit / Exam:   Subjective: The patient was seen and examined sitting up in bed this morning.  He says he feels good today.  He denies any complaints this morning.  He was alert, but continued to be slow to respond to questions with word finding difficulty.  Vitals: Blood Pressure: 126/84 (05/14/24 1302)  Pulse: 63 (05/14/24 1302)  Temperature: 98.7 °F (37.1 °C) (05/14/24 1302)  Temp Source: Temporal (05/14/24 1245)  Respirations: 16 (05/14/24 1245)  Height: 5' 7\" " (170.2 cm) (05/14/24 1227)  Weight - Scale: 72.6 kg (160 lb) (05/14/24 1227)  SpO2: 97 % (05/14/24 1302)  Exam:   Physical Exam  Vitals and nursing note reviewed.   Constitutional:       General: He is not in acute distress.     Appearance: He is not ill-appearing.   HENT:      Head: Normocephalic and atraumatic.      Mouth/Throat:      Mouth: Mucous membranes are moist.      Pharynx: Oropharynx is clear.   Eyes:      Conjunctiva/sclera: Conjunctivae normal.   Cardiovascular:      Rate and Rhythm: Normal rate and regular rhythm.   Pulmonary:      Effort: Pulmonary effort is normal.      Breath sounds: Normal breath sounds.   Abdominal:      General: Bowel sounds are normal. There is no distension.      Palpations: Abdomen is soft. There is no mass.      Tenderness: There is no abdominal tenderness. There is no guarding.   Musculoskeletal:      Right lower leg: No edema.      Left lower leg: No edema.   Skin:     General: Skin is warm and dry.   Neurological:      Mental Status: He is alert.      Comments: Expressive and receptive aphasia noted. Patient is slow to answer questions, and answers in short sentences. He does not appear frustrated at his limited ability to communicate.         Discussion with Family: Updated  (sister) via phone.    Discharge instructions/Information to patient and family:   See after visit summary for information provided to patient and family.      Provisions for Follow-Up Care:  See after visit summary for information related to follow-up care and any pertinent home health orders.      Mobility at time of Discharge:   Basic Mobility Inpatient Raw Score: 19  JH-HLM Goal: 6: Walk 10 steps or more  JH-HLM Achieved: 1: Laying in bed (due to RAYRAY)  HLM Goal NOT achieved. Continue to encourage mobility in post discharge setting.     Disposition:   Home    Planned Readmission: No    Discharge Medications:  See after visit summary for reconciled discharge medications provided to  patient and/or family.      **Please Note: This note may have been constructed using a voice recognition system**

## 2024-05-14 NOTE — PROGRESS NOTES
"Progress Note - Neurology   Galdino Gaines 48 y.o. male [id number]  Unit/Bed#: S /S -01    Assessment/Plan:    * Cerebrovascular accident (CVA) of left basal ganglia (HCC)  Assessment & Plan  48 y.o. male with HTN and HLD who has not seen a medical provider in several years who presented to [facility] on 5/11/2024 due to intractable hiccups x 5 days and \"feeling off.\"  He was also noted to have mild aphasia with paraphasic errors.  CT head showed hypodensity in the left basal ganglia concerning for CVA, so the patient was placed on stroke pathway.    MRI brain revealed acute infarct in the left corona radiata extending into the left gangliocapsular region.    CTA head/neck negative for LVO, significant stenosis, or aneurysm.  Echo: EF 70%, grade 1 diastolic relaxation, normal atria size bilaterally  , cholesterol 282, A1C 5.0    Etiology for left basal ganglia infarct remains unclear, but possibly due to poorly controlled vascular risk factors.  However, cannot rule out cardioembolic stroke or hypercoagulable state.    Plan:  - Stroke pathway  RAYRAY completed; final report pending.  If normal, no further inpatient Neuro recommendations.  Recommend outpatient loop recorder  Thrombosis panel in process  Continue Aspirin 81 mg daily (antiplatelet naive PTA)  Continue Atorvastatin 80 mg daily (statin naive PTA)  Continue Thorazine 25 mg Q6 hours PRN hiccups (hiccups have resolved)  Normotensive goal  Euglycemic, normothermic goal  Continue telemetry  PT/OT/ST  Stroke education  Frequent neuro checks. Continue to monitor and notify neurology with any changes.  STAT CT head for any acute change in neuro exam  - Patient will require help in finding a PCP  - Medical management and supportive care per primary team. Correction of any metabolic or infectious disturbances.     Hyperlipidemia  Assessment & Plan  - Lipitor 80 mg daily    Intractable hiccups  Assessment & Plan  - Patient's hiccups have resolved.  " Thorazine PRN.    Hypertension  Assessment & Plan  - Normotensive goal        Galdino Gaines will need follow up in 6-8 weeks with neurovascular attending or AP .  He will require outpatient loop recorder.      Subjective:   Patient seen and examined following RAYRAY.  Patient is lethargic, but denies any complaints.        Past Medical History:   Diagnosis Date    Hypertension      History reviewed. No pertinent surgical history.  History reviewed. No pertinent family history.  Social History     Socioeconomic History    Marital status: Single     Spouse name: None    Number of children: None    Years of education: None    Highest education level: None   Occupational History    None   Tobacco Use    Smoking status: Never     Passive exposure: Never    Smokeless tobacco: Never   Vaping Use    Vaping status: Never Used   Substance and Sexual Activity    Alcohol use: Never    Drug use: Never    Sexual activity: Not Currently   Other Topics Concern    None   Social History Narrative    None     Social Determinants of Health     Financial Resource Strain: Not on file   Food Insecurity: Not on file   Transportation Needs: Not on file   Physical Activity: Not on file   Stress: Not on file   Social Connections: Not on file   Intimate Partner Violence: Not on file   Housing Stability: Not on file     E-Cigarette/Vaping    E-Cigarette Use Never User      E-Cigarette/Vaping Substances    Nicotine No     THC No     CBD No     Flavoring No          Medications:  All current active meds have been reviewed and current meds:  Scheduled Meds:  Current Facility-Administered Medications   Medication Dose Route Frequency Provider Last Rate    acetaminophen  650 mg Oral Q6H PRN Shana Weinstein MD      aspirin  81 mg Oral Daily Kwadwo Crowley DO      atorvastatin  80 mg Oral QPM Kwadwo Crowley DO      chlorproMAZINE  25 mg Oral Q6H PRN Shana Weinstein MD      enoxaparin  40 mg Subcutaneous Q24H [facility] Shana Weinstein MD    "   hydrALAZINE  10 mg Intravenous Q6H PRN Shana Weinstein MD      ondansetron  4 mg Intravenous Q6H PRN Shana Weinstein MD      polyethylene glycol  17 g Oral Daily PRN Shana Weinstein MD      senna  1 tablet Oral Daily Shana Weinstein MD       Continuous Infusions:   PRN Meds:.  acetaminophen    chlorproMAZINE    hydrALAZINE    ondansetron    polyethylene glycol       ROS:   Review of Systems   All other systems reviewed and are negative.            Vitals:   /84   Pulse 63   Temp 98.7 °F (37.1 °C)   Resp 16   Ht 5' 7\" (1.702 m)   Wt 72.6 kg (160 lb)   SpO2 97%   BMI 25.06 kg/m²     Physical Exam:   Physical Exam  Vitals and nursing note reviewed.   Constitutional:       Appearance: Normal appearance.      Comments: Patient lying comfortably in bed in no acute distress.  He is lethargic, but remains awake throughout the exam.  Slightly flat affect   HENT:      Head: Normocephalic and atraumatic.      Mouth/Throat:      Mouth: Mucous membranes are moist.      Pharynx: Oropharynx is clear.   Eyes:      Extraocular Movements: Extraocular movements intact.      Conjunctiva/sclera: Conjunctivae normal.      Pupils: Pupils are equal, round, and reactive to light.   Pulmonary:      Effort: Pulmonary effort is normal.   Musculoskeletal:         General: Normal range of motion.   Skin:     General: Skin is warm and dry.   Neurological:      Mental Status: He is alert and oriented to person, place, and time.      Comments: See full neuro exam below       Neurologic Exam     Mental Status   Oriented to person, place, and time.   Patient awake, but lethargic  Oriented to person, place, month, and year.  Hypophonic speech  No dysarthria.  Patient has difficulty naming objects and intermittently has trouble following some commands.  Seems to have mild mixed aphasia.  Able to follow simple midline and appendicular commands.     Cranial Nerves     CN III, IV, VI   Pupils are equal, round, and reactive to " light.  Difficulty visualizing pupil on the left.  R pupil 2 mm, round, and at max constriction  EOMs intact without nystagmus.  Blink to threat intact bilaterally  Facial sensation to light touch intact throughout.  Facial expressions full and symmetric.  Tongue midline.  Hearing grossly intact.     Motor Exam   5/5 strength in bilateral upper and lower extremities.     Sensory Exam   Sensation to light touch intact throughout.     Gait, Coordination, and Reflexes     Gait  Gait: (deferred for patient's safety)  No ataxia with finger to nose bilaterally  Movements are slow  No resting or action tremor           Labs: I have personally reviewed pertinent reports.   Recent Results (from the past 24 hour(s))   CBC and differential    Collection Time: 05/14/24  5:48 AM   Result Value Ref Range    WBC 4.75 4.31 - 10.16 Thousand/uL    RBC 4.60 3.88 - 5.62 Million/uL    Hemoglobin 13.6 12.0 - 17.0 g/dL    Hematocrit 40.0 36.5 - 49.3 %    MCV 87 82 - 98 fL    MCH 29.6 26.8 - 34.3 pg    MCHC 34.0 31.4 - 37.4 g/dL    RDW 12.3 11.6 - 15.1 %    MPV 9.4 8.9 - 12.7 fL    Platelets 214 149 - 390 Thousands/uL    nRBC 0 /100 WBCs    Segmented % 53 43 - 75 %    Immature Grans % 0 0 - 2 %    Lymphocytes % 35 14 - 44 %    Monocytes % 8 4 - 12 %    Eosinophils Relative 3 0 - 6 %    Basophils Relative 1 0 - 1 %    Absolute Neutrophils 2.54 1.85 - 7.62 Thousands/µL    Absolute Immature Grans 0.01 0.00 - 0.20 Thousand/uL    Absolute Lymphocytes 1.66 0.60 - 4.47 Thousands/µL    Absolute Monocytes 0.36 0.17 - 1.22 Thousand/µL    Eosinophils Absolute 0.14 0.00 - 0.61 Thousand/µL    Basophils Absolute 0.04 0.00 - 0.10 Thousands/µL       Imaging: I have personally reviewed pertinent imaging in PACS, including CT head, CTA head/neck, MRI brain, Echo,  and I have personally reviewed PACS reports.     EKG, Pathology, and Other Studies: I have personally reviewed pertinent reports.       VTE Prophylaxis: Sequential compression device (Venodyne)   and Enoxaparin (Lovenox)

## 2024-05-14 NOTE — PROGRESS NOTES
[facility]  Progress Note  Name: Galdino Gaines I  MRN: [id number]  Unit/Bed#: S -Stacy I Date of Admission: 5/11/2024   Date of Service: 5/14/2024 I Hospital Day: 3    Assessment/Plan   * Cerebrovascular accident (CVA) of left basal ganglia (HCC)  Assessment & Plan  Presented with 1 week of persistent hiccups.   Expressive aphasia and impaired finger-to-nose noted on admission exam  CT head: Hypodensity in the left basal ganglia concerning for subacute ischemia  No TNK or stroke alert in the ED due to symptoms being present for 1 week  At baseline patient is highly educated (Masters degree) and has no difficulty expressing himself fluently in English  TSH, HbA1c within normal limits  Risk factor for CVA: Hypertension, Severe hyperlipidemia  CTA: No occlusion or stenosis of the major vessels  MRI brain: Acute infarct in the corona radiata, extending to the left gangliocapsular region  TTE: LVEF 70%, G1DD. Bubble study not performed  Episode of lethargy and worsening expressive aphasia overnight, however resolved by morning exam.   STAT CTA overnight on 5/12 re-demonstrated the basal ganglia infarct. No hemorrhage.     Plan:  Monitor on tele  Follow up thrombosis panel, pending  Atorvastatin 80 mg daily, aspirin 81 mg daily  PT/OT/Speech eval  BP Goal: Normotension (-160)  Neurology is recommending loop recorder or Zio patch outpatient.  However patient is currently [place] from [place] the [place].  Will discuss with patient and family about plans for outpatient follow-up.  Will need follow up in 6-8 weeks with neurovascular attending or AP  Neurology consulted, appreciate recommendations    Intractable hiccups  Assessment & Plan  Possibly related to CVA  Thorazine started 5/12, with resolution of hiccups, however may have contributed to episode of lethargy overnight 5/12-5/13  Thorazine changed from scheduled to PRN  Hiccups resolved     Plan:   Continue Thorazine  PRN    Hyperlipidemia  Assessment & Plan  Lab Results   Component Value Date    CHOLESTEROL 282 (H) 05/12/2024    TRIG 88 05/12/2024    HDL 59 05/12/2024    LDLCALC 205 (H) 05/12/2024     Atorvastatin 80 mg daily    Constipation  Assessment & Plan  Patient reports last BM 2-3 days PTA    Plan:  Senna 8.6mg daily  Miralax daily PRN    Hypertension  Assessment & Plan  Blood Pressure: 141/84  Patient reports history of hypertension, is on 1 medication at home but does not recall the name.  The patient's stroke symptoms have been going on for 1 week. Do not need to allow for permissive HTN.     Plan:  Monitor blood pressure.   BP Goal: Normotension (-160)  PRN Hydralazine for SBP >160           Medical Problems       Resolved Problems  Date Reviewed: 5/12/2024            Resolved    Hypokalemia 5/12/2024     Resolved by  Kwadwo Crowley DO        Discharging Resident: Lorie Vegas  Discharging Attending: Nadia Medrano MD  PCP: No primary care provider on file.  Admission Date:   Admission Orders (From admission, onward)       Ordered        05/11/24 1639  INPATIENT ADMISSION  Once                          Discharge Date: 05/14/24    Consultations During Hospital Stay:  Neurology, Cardiology, speech, OT/PT    Procedures Performed:   Echocardiogram, RAYRAY    Significant Findings / Test Results:   MRI brain: Acute infarct in the corona radiata, extending to the left gangliocapsular region  CT head: Hypodensity in the left basal ganglia concerning for subacute ischemia    Incidental Findings:   None    Test Results Pending at Discharge (will require follow up):  Thrombosis Panel     Outpatient Tests Requested:  ***    Complications:  None    Reason for Admission: one week of intractable hiccups    Hospital Course:   Galdino Gaines is a 48 y.o. male patient who originally presented to the hospital on 5/11/2024 due to a one week history of intractable hiccups. Patient was also noted to have significant  "expressive aphasia. According to his sister, he is well educated, having received his master degree and higher education in English, yet upon admission was having difficulty communicating his thoughts and had been quiet for a few days prior. CT head and MRI head revealed infarcts in the corona radiata and basal ganglia suggest of CVA. Additionally, lipid blood panel showed elevated cholesterol (282) and LDL (205). Patient was then started on Asprin and atorvastatin per neurology. Thorazine PRN was also given for the hiccups. Symptoms have improved throughout the course of his hospital stay. Echocardiogram showed no abnormalities and ECG showed normal sinus rhythm.     ***  Cardiology suggested Zio patch on discharge with subsequent follow up. Will need to follow up with neurovascular in 6-8 weeks. Patient will also be discharge with Asprin and atorvastatin.      Please see above list of diagnoses and related plan for additional information.     Condition at Discharge: good    Discharge Day Visit / Exam:   Subjective:  No acute events overnight. Patient is well appearing and in no acute distress. He denies fever, chills, headaches, N/V/D/C, abdominal pain, or urinary symptoms. Is able to communicate and answer questions, but he is still slow to respond and not at baseline.   Vitals: Blood Pressure: 125/69 (05/14/24 1235)  Pulse: 70 (05/14/24 1235)  Temperature: (!) 97.2 °F (36.2 °C) (05/14/24 1228)  Temp Source: Temporal (05/14/24 1137)  Respirations: 16 (05/14/24 1235)  Height: 5' 7\" (170.2 cm) (05/14/24 1227)  Weight - Scale: 72.6 kg (160 lb) (05/14/24 1227)  SpO2: 98 % (05/14/24 1235)  Exam:   Physical Exam  Vitals and nursing note reviewed.   Constitutional:       General: He is not in acute distress.     Appearance: He is well-developed.   HENT:      Head: Normocephalic and atraumatic.   Eyes:      Conjunctiva/sclera: Conjunctivae normal.   Cardiovascular:      Rate and Rhythm: Normal rate and regular rhythm.    "   Heart sounds: No murmur heard.     No gallop.   Pulmonary:      Effort: Pulmonary effort is normal. No respiratory distress.      Breath sounds: Normal breath sounds. No wheezing or rales.   Abdominal:      General: There is no distension.      Palpations: Abdomen is soft.      Tenderness: There is no abdominal tenderness. There is no guarding.   Musculoskeletal:         General: No swelling.      Cervical back: Neck supple.   Skin:     General: Skin is warm and dry.      Capillary Refill: Capillary refill takes less than 2 seconds.   Neurological:      Mental Status: He is alert.      Cranial Nerves: No cranial nerve deficit.      Coordination: Finger-Nose-Finger Test (slow) and Heel to Shin Test (Slow) normal.   Psychiatric:         Mood and Affect: Mood normal.         Discussion with Family: Updated  (sister) via phone.    Discharge instructions/Information to patient and family:   See after visit summary for information provided to patient and family.      Provisions for Follow-Up Care:  See after visit summary for information related to follow-up care and any pertinent home health orders.      Mobility at time of Discharge:   Basic Mobility Inpatient Raw Score: 19  JH-HLM Goal: 6: Walk 10 steps or more  JH-HLM Achieved: 2: Bed activities/Dependent transfer  HLM Goal achieved. Continue to encourage appropriate mobility.     Disposition:   {Disposition on Discharge:26657}    Planned Readmission: None    Discharge Medications:  See after visit summary for reconciled discharge medications provided to patient and/or family.      **Please Note: This note may have been constructed using a voice recognition system**

## 2024-05-14 NOTE — DISCHARGE INSTR - AVS FIRST PAGE
Dear Galdino Gaines,     It was our pleasure to care for you here at [facility].  It is our hope that we were always able to exceed the expected standards for your care during your stay.  You were hospitalized due to Stroke.  You were cared for on the 3rd floor by Shana Weinstein MD under the service of Nadia Medrano MD with the [facility]ist [facility] who covers for your primary care physician (PCP), while you were hospitalized.  If you have any questions or concerns related to this hospitalization, you may contact us at .  For follow up as well as any medication refills, we recommend that you follow up with your primary care physician.  A registered nurse will reach out to you by phone within a few days after your discharge to answer any additional questions that you may have after going home.  However, at this time we provide for you here, the most important instructions / recommendations at discharge:     Notable Medication Adjustments -   Please start taking Atorvastatin (Lipitor) 80 mg tablet: 1 tablet (80 mg total) by mouth every evening  Please start taking Aspirin 81 mg: Take 1 tablet by mouth daily  Please take these medications every day, and do not stop taking these medications unless instructed by your physician.  These medications should be taken long-term.  Testing Required after Discharge -   None  ** Please contact your PCP to request testing orders for any of the testing recommended here **  Important follow up information -   Please call to make an appointment to establish care with a primary care physician within 1 week of discharge.  You may follow-up at the clinic at [address] [facility] [address] . (Phone: [phone])  Please call to make a follow-up appointment with Neurology within 6 weeks of discharge (Phone: [phone])  Please follow-up with cardiology.  Someone from the office will give you a call.  If you do  not receive a call within the next week, please call [phone] to make an appointment.  Please refer to the Purple sheet provided to you at discharge for locations for Speech Therapy. Please call to make an appointment and to inquire about pricing.   Other Instructions -   Take your blood pressure twice per day at home, and record these numbers to bring to your follow up appointment with your PCP  Use call your doctor or return to the emergency department if you have worsening symptoms, or any symptoms that are concerning to you.  Please review this entire after visit summary as additional general instructions including medication list, appointments, activity, diet, any pertinent wound care, and other additional recommendations from your care team that may be provided for you.      Sincerely,     Shana Weinstein MD

## 2024-05-14 NOTE — ANESTHESIA PREPROCEDURE EVALUATION
"Procedure:  RAYRAY    Relevant Problems   CARDIO   (+) Hyperlipidemia   (+) Hypertension      NEURO/PSYCH   (+) Cerebrovascular accident (CVA) of left basal ganglia (HCC)        Recent CVA, maintain normotension    Lab Results   Component Value Date    SODIUM 137 05/13/2024    K 4.5 05/13/2024    BUN 12 05/13/2024    CREATININE 1.02 05/13/2024    EGFR 86 05/13/2024     Lab Results   Component Value Date    HGBA1C 5.0 05/12/2024       Lab Results   Component Value Date    HGB 13.6 05/14/2024    HGB 14.5 05/13/2024    HGB 14.5 05/12/2024     05/14/2024     05/13/2024     05/12/2024     Lab Results   Component Value Date    WBC 4.75 05/14/2024       Lab Results   Component Value Date    CREATININE 1.02 05/13/2024    CREATININE 0.96 05/12/2024    CREATININE 0.86 05/11/2024       No results found for: \"INR\"  No results found for: \"PTT\"    No results found for: \"LACTATE\"                        Physical Exam    Airway    Mallampati score: II  TM Distance: >3 FB       Dental   No notable dental hx     Cardiovascular      Pulmonary      Other Findings        Anesthesia Plan  ASA Score- 3     Anesthesia Type- IV sedation with anesthesia with ASA Monitors.         Additional Monitors:     Airway Plan:     Comment: I, Lior Hull M.D., have personally seen and evaluated the patient prior to anesthetic care.  I have reviewed the pre-anesthetic record, and other medical records if appropriate to the anesthetic care.  If a CRNA is involved in the case, I have reviewed the CRNA assessment, if present, and agree.      Risks/benefits and alternatives discussed with patient including possible PONV, sore throat, and possibility of rare anesthetic and surgical emergencies.  .       Plan Factors-    Chart reviewed. EKG reviewed. Imaging results reviewed. Existing labs reviewed. Patient summary reviewed.      Patient instructed to abstain from smoking on day of procedure.     Obstructive sleep apnea risk " education given perioperatively.        Induction-     Postoperative Plan-     Informed Consent- Anesthetic plan and risks discussed with patient.  I personally reviewed this patient with the CRNA. Discussed and agreed on the Anesthesia Plan with the CRNA..

## 2024-05-14 NOTE — ANESTHESIA POSTPROCEDURE EVALUATION
Post-Op Assessment Note    CV Status:  Stable  Pain Score: 0    Pain management: adequate       Mental Status:  Sleepy   Hydration Status:  Euvolemic   PONV Controlled:  Controlled   Airway Patency:  Patent     Post Op Vitals Reviewed: Yes    No anethesia notable event occurred.    Staff: Anesthesiologist, CRNA               /70 (05/14/24 1228)    Temp (!) 97.2 °F (36.2 °C) (05/14/24 1228)    Pulse 72 (05/14/24 1228)   Resp 12 (05/14/24 1228)    SpO2 100 % (05/14/24 1228)

## 2024-05-14 NOTE — CONSULTS
Consultation - Cardiology Team 1  Galdino Gaines 48 y.o. male MRN: [id number]  Unit/Bed#: S -01 Encounter: [id number]    Assessment/Plan     Principal Problem:    Cerebrovascular accident (CVA) of left basal ganglia (HCC)  Active Problems:    Hypertension    Constipation    Intractable hiccups    Hyperlipidemia      Assessment:  Lt basal ganglia CVA- started on asa 81mg, statin .  Hypertension- on no anti hypertensive management at this time.        BP normotensive, slight hypertensive  HLD-cholesterol 282/triglyceride 88/HDL 59/calculated         Started on atorvastatin 40mg     Plan:  This is a consult for outpatient event monitor in a patient not established with our practice.   If RAYRAY negative  will arrange for 2-week Zio patch.  If further monitoring needed would recommend loop recorder when patient gets to [place]. Spoke with patient who verbalized understanding. Our office will be in contact with him to arrange.     History of Present Illness   Physician Requesting Consult: Nadia Medrano MD  Reason for Consult / Principal Problem: zio patch/loop recorder     HPI: Galdino Gaines is a 48 y.o. year old male with hypertension and HLD who presents with hiccups and altered mental status.  Initially had expressive aphasia.  CT with hypodensity in the left basal ganglia concerning for subacute ischemia.  No TNK since symptoms present for at least 1 week.  MRI revealed acute/subacute infarct left corona radiata stenting into the left gangliocapsular .  No occlusion, severe stenosis or aneurysm of the major arteries of neck.  No significant stenosis within either carotid artery.  CTA of the head and neck negative for LVO, significant stenosis or aneurysm.  TTE normal LV function with grade 1 diastolic dysfunction.  Normal atrial size bilaterally.  Per neurology etiology of infarct remains unclear but likely due to poorly controlled vascular risk factors however cardioembolic stroke  or hypercoagulable state not able to be excluded.  Both RAYRAY and event monitoring have been recommended.      Inpatient consult to Cardiology  Consult performed by: EVE Malhotra  Consult ordered by: Shana Weinstein MD          Review of Systems   Constitutional: Negative.    HENT: Negative.     Eyes: Negative.    Respiratory: Negative.     Cardiovascular: Negative.    Gastrointestinal: Negative.    Endocrine: Negative.    Genitourinary: Negative.    Musculoskeletal: Negative.    Allergic/Immunologic: Negative.    Neurological: Negative.    Hematological: Negative.    Psychiatric/Behavioral: Negative.     All other systems reviewed and are negative.      Historical Information   Past Medical History:   Diagnosis Date    Hypertension      History reviewed. No pertinent surgical history.  Social History     Substance and Sexual Activity   Alcohol Use Never     Social History     Substance and Sexual Activity   Drug Use Never     Social History     Tobacco Use   Smoking Status Never    Passive exposure: Never   Smokeless Tobacco Never     Family History: History reviewed. No pertinent family history.    Meds/Allergies   current meds:   Current Facility-Administered Medications   Medication Dose Route Frequency    acetaminophen (TYLENOL) tablet 650 mg  650 mg Oral Q6H PRN    aspirin (ECOTRIN LOW STRENGTH) EC tablet 81 mg  81 mg Oral Daily    atorvastatin (LIPITOR) tablet 80 mg  80 mg Oral QPM    chlorproMAZINE (THORAZINE) tablet 25 mg  25 mg Oral Q6H PRN    enoxaparin (LOVENOX) subcutaneous injection 40 mg  40 mg Subcutaneous Q24H WILLIAM    hydrALAZINE (APRESOLINE) injection 10 mg  10 mg Intravenous Q6H PRN    ondansetron (ZOFRAN) injection 4 mg  4 mg Intravenous Q6H PRN    polyethylene glycol (MIRALAX) packet 17 g  17 g Oral Daily PRN    senna (SENOKOT) tablet 8.6 mg  1 tablet Oral Daily    and PTA meds:    No medications prior to admission.     No Known Allergies    Objective   Vitals: Blood pressure 141/84,  "pulse 58, temperature 98.6 °F (37 °C), temperature source Oral, resp. rate 18, height 5' 7\" (1.702 m), weight 72.8 kg (160 lb 7.9 oz), SpO2 100%.  Orthostatic Blood Pressures      Flowsheet Row Most Recent Value   Blood Pressure 141/84 filed at 05/14/2024 0708   Patient Position - Orthostatic VS Lying filed at 05/14/2024 0708              Intake/Output Summary (Last 24 hours) at 5/14/2024 1116  Last data filed at 5/14/2024 0115  Gross per 24 hour   Intake 120 ml   Output --   Net 120 ml       Invasive Devices       Peripheral Intravenous Line  Duration             Peripheral IV 05/11/24 Left Antecubital 2 days                    Physical Exam: /84 (BP Location: Right arm)   Pulse 58   Temp 98.6 °F (37 °C) (Oral)   Resp 18   Ht 5' 7\" (1.702 m)   Wt 72.8 kg (160 lb 7.9 oz)   SpO2 100%   BMI 25.14 kg/m²   General Appearance:    Alert, cooperative, no distress, appears stated age   Head:    Normocephalic, no scleral icterus   Eyes:    PERRL   Nose:   Nares normal, septum midline, mucosa normal, no drainage    Throat:   Lips, mucosa, and tongue normal   Neck:   Supple, symmetrical, trachea midline     no carotid bruit or JVD   Back:     Symmetric   Lungs:     Clear to auscultation bilaterally, respirations unlabored   Chest Wall:    No tenderness or deformity    Heart:    Regular rate and rhythm, S1 and S2 normal, no murmur, rub   or gallop   Abdomen:     Soft, non-tender, bowel sounds active all four quadrants,     no masses, no organomegaly   Extremities:   Extremities normal, atraumatic, no cyanosis or edema   Pulses:   2+ and symmetric all extremities   Skin:   Skin color, texture, turgor normal, no rashes or lesions   Neurologic:   Alert and oriented to person place and time.        Lab Results:   Recent Results (from the past 72 hour(s))   ECG 12 lead    Collection Time: 05/11/24  1:11 PM   Result Value Ref Range    Ventricular Rate 59 BPM    Atrial Rate 59 BPM    WY Interval 158 ms    QRSD Interval " "124 ms    QT Interval 404 ms    QTC Interval 399 ms    P Axis 46 degrees    QRS Axis 88 degrees    T Wave Axis -2 degrees   CBC and differential    Collection Time: 05/11/24  1:30 PM   Result Value Ref Range    WBC 6.31 4.31 - 10.16 Thousand/uL    RBC 4.45 3.88 - 5.62 Million/uL    Hemoglobin 13.2 12.0 - 17.0 g/dL    Hematocrit 38.9 36.5 - 49.3 %    MCV 87 82 - 98 fL    MCH 29.7 26.8 - 34.3 pg    MCHC 33.9 31.4 - 37.4 g/dL    RDW 12.3 11.6 - 15.1 %    MPV 9.2 8.9 - 12.7 fL    Platelets 206 149 - 390 Thousands/uL    nRBC 0 /100 WBCs    Segmented % 63 43 - 75 %    Immature Grans % 0 0 - 2 %    Lymphocytes % 25 14 - 44 %    Monocytes % 9 4 - 12 %    Eosinophils Relative 3 0 - 6 %    Basophils Relative 0 0 - 1 %    Absolute Neutrophils 3.95 1.85 - 7.62 Thousands/µL    Absolute Immature Grans 0.02 0.00 - 0.20 Thousand/uL    Absolute Lymphocytes 1.57 0.60 - 4.47 Thousands/µL    Absolute Monocytes 0.59 0.17 - 1.22 Thousand/µL    Eosinophils Absolute 0.16 0.00 - 0.61 Thousand/µL    Basophils Absolute 0.02 0.00 - 0.10 Thousands/µL   HS Troponin 0hr (reflex protocol)    Collection Time: 05/11/24  1:31 PM   Result Value Ref Range    hs TnI 0hr <2 \"Refer to ACS Flowchart\"- see link ng/L   Comprehensive metabolic panel    Collection Time: 05/11/24  2:02 PM   Result Value Ref Range    Sodium 136 135 - 147 mmol/L    Potassium 3.4 (L) 3.5 - 5.3 mmol/L    Chloride 103 96 - 108 mmol/L    CO2 27 21 - 32 mmol/L    ANION GAP 6 4 - 13 mmol/L    BUN 15 5 - 25 mg/dL    Creatinine 0.86 0.60 - 1.30 mg/dL    Glucose 99 65 - 140 mg/dL    Calcium 8.9 8.4 - 10.2 mg/dL    AST 19 13 - 39 U/L    ALT 23 7 - 52 U/L    Alkaline Phosphatase 71 34 - 104 U/L    Total Protein 6.7 6.4 - 8.4 g/dL    Albumin 4.0 3.5 - 5.0 g/dL    Total Bilirubin 0.71 0.20 - 1.00 mg/dL    eGFR 102 ml/min/1.73sq m   Lipase    Collection Time: 05/11/24  2:02 PM   Result Value Ref Range    Lipase 28 11 - 82 u/L   TSH, 3rd generation with Free T4 reflex    Collection Time: " 05/11/24  2:02 PM   Result Value Ref Range    TSH 3RD GENERATON 1.404 0.450 - 4.500 uIU/mL   Magnesium    Collection Time: 05/11/24  2:02 PM   Result Value Ref Range    Magnesium 1.9 1.9 - 2.7 mg/dL   Lipid Panel with Direct LDL reflex    Collection Time: 05/12/24  5:27 AM   Result Value Ref Range    Cholesterol 282 (H) See Comment mg/dL    Triglycerides 88 See Comment mg/dL    HDL, Direct 59 >=40 mg/dL    LDL Calculated 205 (H) 0 - 100 mg/dL   Hemoglobin A1c w/EAG Estimation    Collection Time: 05/12/24  5:27 AM   Result Value Ref Range    Hemoglobin A1C 5.0 Normal 4.0-5.6%; PreDiabetic 5.7-6.4%; Diabetic >=6.5%; Glycemic control for adults with diabetes <7.0% %    EAG 97 mg/dl   CBC and differential    Collection Time: 05/12/24  5:27 AM   Result Value Ref Range    WBC 7.51 4.31 - 10.16 Thousand/uL    RBC 4.95 3.88 - 5.62 Million/uL    Hemoglobin 14.5 12.0 - 17.0 g/dL    Hematocrit 43.3 36.5 - 49.3 %    MCV 88 82 - 98 fL    MCH 29.3 26.8 - 34.3 pg    MCHC 33.5 31.4 - 37.4 g/dL    RDW 12.5 11.6 - 15.1 %    MPV 9.4 8.9 - 12.7 fL    Platelets 223 149 - 390 Thousands/uL    nRBC 0 /100 WBCs    Segmented % 70 43 - 75 %    Immature Grans % 0 0 - 2 %    Lymphocytes % 20 14 - 44 %    Monocytes % 8 4 - 12 %    Eosinophils Relative 2 0 - 6 %    Basophils Relative 0 0 - 1 %    Absolute Neutrophils 5.23 1.85 - 7.62 Thousands/µL    Absolute Immature Grans 0.03 0.00 - 0.20 Thousand/uL    Absolute Lymphocytes 1.51 0.60 - 4.47 Thousands/µL    Absolute Monocytes 0.57 0.17 - 1.22 Thousand/µL    Eosinophils Absolute 0.14 0.00 - 0.61 Thousand/µL    Basophils Absolute 0.03 0.00 - 0.10 Thousands/µL   Basic metabolic panel    Collection Time: 05/12/24  5:27 AM   Result Value Ref Range    Sodium 137 135 - 147 mmol/L    Potassium 4.3 3.5 - 5.3 mmol/L    Chloride 103 96 - 108 mmol/L    CO2 27 21 - 32 mmol/L    ANION GAP 7 4 - 13 mmol/L    BUN 12 5 - 25 mg/dL    Creatinine 0.96 0.60 - 1.30 mg/dL    Glucose 92 65 - 140 mg/dL    Calcium 9.3 8.4  - 10.2 mg/dL    eGFR 93 ml/min/1.73sq m   Antithrombin III Activity    Collection Time: 05/12/24  2:44 PM   Result Value Ref Range    AntiThrombIN III Activity 120 92 - 136 % of Normal   Protein C activity    Collection Time: 05/12/24  2:44 PM   Result Value Ref Range    Protein C Activity >150 (H) 60 - 150 % of Normal   Protein S activity    Collection Time: 05/12/24  2:44 PM   Result Value Ref Range    PROTEIN S ACTIVITY 91 71 - 117 %   Echo complete w/ contrast if indicated    Collection Time: 05/12/24  3:06 PM   Result Value Ref Range    RAA A4C 11.9 cm2    LA Volume Index (BP) 19.9 mL/m2    MV Peak A Darren 0.75 m/s    MV stenosis pressure 1/2 time 99 ms    MV Peak E Darren 44 cm/s    E wave deceleration time 341 ms    E/A ratio 0.59     MV valve area p 1/2 method 2.22     RA 2D Volume 25.0 mL    RVID d 3.0 cm    A4C EF 72 %    Left ventricular stroke volume (2D) 65.00 mL    IVSd 1.00 cm    Tricuspid annular plane systolic excursion 2.40 cm    Ao root 2.80 cm    LVPWd 0.80 cm    LA size 2.7 cm    LA volume (BP) 37 mL    FS 47 28 - 44    LVIDS 2.30 cm    IVS 1 cm    LVIDd 4.30 cm    LA length (A2C) 4.80 cm    LEFT VENTRICLE SYSTOLIC VOLUME (MOD BIPLANE) 2D 18 mL    LV DIASTOLIC VOLUME (MOD BIPLANE) 2D 83 mL    Left Atrium Area-systolic Four Chamber 15.2 cm2    Left Atrium Area-systolic Apical Two Chamber 14 cm2    MV E' Tissue Velocity Septal 6 cm/s    LVSV, 2D 65 mL    BSA 1.86 m2    LV EF 70    Basic metabolic panel    Collection Time: 05/13/24  5:28 AM   Result Value Ref Range    Sodium 137 135 - 147 mmol/L    Potassium 4.5 3.5 - 5.3 mmol/L    Chloride 103 96 - 108 mmol/L    CO2 25 21 - 32 mmol/L    ANION GAP 9 4 - 13 mmol/L    BUN 12 5 - 25 mg/dL    Creatinine 1.02 0.60 - 1.30 mg/dL    Glucose 89 65 - 140 mg/dL    Calcium 9.5 8.4 - 10.2 mg/dL    eGFR 86 ml/min/1.73sq m   CBC and differential    Collection Time: 05/13/24  5:28 AM   Result Value Ref Range    WBC 4.92 4.31 - 10.16 Thousand/uL    RBC 4.89 3.88 - 5.62  Million/uL    Hemoglobin 14.5 12.0 - 17.0 g/dL    Hematocrit 43.7 36.5 - 49.3 %    MCV 89 82 - 98 fL    MCH 29.7 26.8 - 34.3 pg    MCHC 33.2 31.4 - 37.4 g/dL    RDW 12.3 11.6 - 15.1 %    MPV 9.4 8.9 - 12.7 fL    Platelets 206 149 - 390 Thousands/uL    nRBC 0 /100 WBCs    Segmented % 46 43 - 75 %    Immature Grans % 0 0 - 2 %    Lymphocytes % 42 14 - 44 %    Monocytes % 8 4 - 12 %    Eosinophils Relative 3 0 - 6 %    Basophils Relative 1 0 - 1 %    Absolute Neutrophils 2.23 1.85 - 7.62 Thousands/µL    Absolute Immature Grans 0.01 0.00 - 0.20 Thousand/uL    Absolute Lymphocytes 2.08 0.60 - 4.47 Thousands/µL    Absolute Monocytes 0.41 0.17 - 1.22 Thousand/µL    Eosinophils Absolute 0.16 0.00 - 0.61 Thousand/µL    Basophils Absolute 0.03 0.00 - 0.10 Thousands/µL   CBC and differential    Collection Time: 05/14/24  5:48 AM   Result Value Ref Range    WBC 4.75 4.31 - 10.16 Thousand/uL    RBC 4.60 3.88 - 5.62 Million/uL    Hemoglobin 13.6 12.0 - 17.0 g/dL    Hematocrit 40.0 36.5 - 49.3 %    MCV 87 82 - 98 fL    MCH 29.6 26.8 - 34.3 pg    MCHC 34.0 31.4 - 37.4 g/dL    RDW 12.3 11.6 - 15.1 %    MPV 9.4 8.9 - 12.7 fL    Platelets 214 149 - 390 Thousands/uL    nRBC 0 /100 WBCs    Segmented % 53 43 - 75 %    Immature Grans % 0 0 - 2 %    Lymphocytes % 35 14 - 44 %    Monocytes % 8 4 - 12 %    Eosinophils Relative 3 0 - 6 %    Basophils Relative 1 0 - 1 %    Absolute Neutrophils 2.54 1.85 - 7.62 Thousands/µL    Absolute Immature Grans 0.01 0.00 - 0.20 Thousand/uL    Absolute Lymphocytes 1.66 0.60 - 4.47 Thousands/µL    Absolute Monocytes 0.36 0.17 - 1.22 Thousand/µL    Eosinophils Absolute 0.14 0.00 - 0.61 Thousand/µL    Basophils Absolute 0.04 0.00 - 0.10 Thousands/µL     Imaging: I have personally reviewed pertinent reports.    EKG: NSR  Left Ventricle Left ventricular cavity size is small. Wall thickness is normal. The left ventricular ejection fraction is 70%. Systolic function is hyperdynamic. Wall motion is normal.  Diastolic function is mildly abnormal, consistent with grade I (abnormal) relaxation.   Right Ventricle Right ventricular cavity size is normal. Systolic function is normal. Wall thickness is normal.   Left Atrium The atrium is normal in size.   Right Atrium The atrium is normal in size.   Aortic Valve The aortic valve is trileaflet. The leaflets are not thickened. The leaflets are not calcified. The leaflets exhibit normal mobility. There is no evidence of regurgitation. The aortic valve has no significant stenosis.   Mitral Valve There is no evidence of regurgitation. There is no evidence of stenosis. The mitral valve has normal structure and normal function.   Tricuspid Valve Tricuspid valve structure is normal. There is trace regurgitation. There is no evidence of stenosis. There is no indirect evidence of pulmonary hypertension.   Pulmonic Valve Pulmonic valve structure is normal. There is trace regurgitation. There is no evidence of stenosis.   Ascending Aorta The aortic root is normal in size.   IVC/SVC The inferior vena cava is normal in size.   Pericardium There is no pericardial effusion. The pericardium is normal in appearance.       Code Status: Level 1 - Full Code  Advance Directive and Living Will:      Power of :    POLST:      Counseling / Coordination of Care  Total floor / unit time spent today 35 minutes.  Greater than 50% of total time was spent with the patient and / or family counseling and / or coordination of care.

## 2024-05-14 NOTE — ASSESSMENT & PLAN NOTE
Lab Results   Component Value Date    CHOLESTEROL 282 (H) 05/12/2024    TRIG 88 05/12/2024    HDL 59 05/12/2024    LDLCALC 205 (H) 05/12/2024     Atorvastatin 80 mg daily, outpatient follow up with PCP

## 2024-05-14 NOTE — PHYSICAL THERAPY NOTE
PHYSICAL THERAPY NOTE          Patient Name: Galdino Gaines  Today's Date: 5/14/2024 05/14/24 1350   PT Last Visit   PT Visit Date 05/14/24   Note Type   Note Type Treatment   Pain Assessment   Pain Assessment Tool 0-10   Pain Score No Pain   Restrictions/Precautions   Weight Bearing Precautions Per Order No   Other Precautions Cognitive;Chair Alarm;Bed Alarm;Telemetry;Fall Risk   General   Chart Reviewed Yes   Family/Caregiver Present No   Cognition   Overall Cognitive Status Impaired   Arousal/Participation Alert   Attention Within functional limits   Orientation Level Oriented to person;Oriented to place;Oriented to time;Oriented to situation   Memory Decreased long term memory;Decreased short term memory   Following Commands Follows one step commands with increased time or repetition   Comments Pt able to follow simples commands and answer simple questions about medical status, appropriate with what is noted in chart   Subjective   Subjective Pt in bed, returning recently from a RAYRAY, able to answer questions appropriately in accordance with charting   Bed Mobility   Rolling R 7  Independent   Rolling L 7  Independent   Supine to Sit 5  Supervision   Additional items Assist x 1;Bedrails   Transfers   Additional Comments Pt remained in bed due to recent RAYRAY and lethargic   Balance   Static Sitting Fair +   Dynamic Sitting Fair +   Activity Tolerance   Activity Tolerance Patient tolerated treatment well   Exercises   Heelslides Supine;10 reps;AROM;Bilateral   Hip Flexion Supine;10 reps;AROM;Bilateral   Knee AROM Supine;10 reps;AROM;Bilateral   Assessment   Prognosis Fair   Problem List Decreased endurance;Impaired balance;Decreased mobility;Decreased coordination;Decreased cognition   Assessment Pt seated in bed to start and end session. Pt recently returning from RAYRAY, cleared by RN for therapy session. Pt able complete  rolling L<>R with I. Pt demonstrated ablility sit self upright in bed to eat lunch. Demonstrated ability to complete Ther ex. Pt able to answer simple questions with minimial to no prompting. Understood and explain with slow speech about RAYRAY. Pt seated in bed to end session with call bell in reach and all needs met. Pt will benefit from continued skilled PT to improve over all strength and endurence for a safe return to PLOF.   Barriers to Discharge Other (Comment)  (unable to attain due to poor speech and attain accuracey of information)   Goals   Patient Goals none stated   STG Expiration Date 05/22/24   Short Term Goal #1 Goals: Pt will: Perform rolling and supine<>sit bed mobility tasks with modified I to prepare for transfers and reposition in bed. Perform transfers with modified I to promote proper hand placement and approach. Perform ambulation with LRAD PRN for >150' with Supervision to increase Indep in home environment and community. Perform at least 4 stairs w/ and w/o railing +/- DME and w/Supervision to decrease risk for falls with community barriers. Improve 4 point DGI score to 10/12 or better to demonstrate less risk for falls   PT Treatment Day 2   Plan   Treatment/Interventions Functional transfer training;LE strengthening/ROM;Elevations;Therapeutic exercise;Bed mobility;Gait training;Cognitive reorientation;Endurance training   Progress Slow progress, cognitive deficits   PT Frequency 4-6x/wk   Discharge Recommendation   Rehab Resource Intensity Level, PT III (Minimum Resource Intensity)  (outpatient therapy pending family dynamic clarification)   Additional Comments unable to clearify family setup/ living situation. Pt continues to state he lives with his sister. Will need more information from CM and or family for definitive DC rec.   AM-PAC Basic Mobility Inpatient   Turning in Flat Bed Without Bedrails 4   Lying on Back to Sitting on Edge of Flat Bed Without Bedrails 4   Moving Bed to Chair 3    Standing Up From Chair Using Arms 3   Walk in Room 3   Climb 3-5 Stairs With Railing 2   Basic Mobility Inpatient Raw Score 19   Basic Mobility Standardized Score 42.48   [facility] Highest Level Of Mobility   -HLM Goal 6: Walk 10 steps or more   -HLM Achieved 1: Laying in bed  (due to RAYRAY)   Education   Education Provided Mobility training   Patient Reinforcement needed   End of Consult   Patient Position at End of Consult Supine;Bed/Chair alarm activated;All needs within reach       The patient's AM-PAC Basic Mobility Inpatient Short Form Raw Score is 19, Standardized Score is 42.48. A standardized score greater than 38.32 (raw score of 16) suggests the patient may benefit from discharge to home which may not coincide with above PT recommendations. However please refer to therapist recommendation for discharge planning given other factors that may influence destination    Solange Desai PTA

## 2024-05-14 NOTE — PROGRESS NOTES
[facility]  Progress Note  Name: Galdino Gaines I  MRN: [id number]  Unit/Bed#: S -Stacy I Date of Admission: 5/11/2024   Date of Service: 5/14/2024 I Hospital Day: 3    Assessment/Plan   * Cerebrovascular accident (CVA) of left basal ganglia (HCC)  Assessment & Plan  Presented with 1 week of persistent hiccups.   Expressive aphasia and impaired finger-to-nose noted on admission exam  CT head: Hypodensity in the left basal ganglia concerning for subacute ischemia  No TNK or stroke alert in the ED due to symptoms being present for 1 week  At baseline patient is highly educated (Masters degree) and has no difficulty expressing himself fluently in English  TSH, HbA1c within normal limits  Risk factor for CVA: Hypertension, Severe hyperlipidemia  CTA: No occlusion or stenosis of the major vessels  MRI brain: Acute infarct in the corona radiata, extending to the left gangliocapsular region  TTE: LVEF 70%, G1DD. Bubble study not performed  Episode of lethargy and worsening expressive aphasia overnight, however resolved by morning exam.   STAT CTA overnight on 5/12 re-demonstrated the basal ganglia infarct. No hemorrhage.     Plan:  RAYRAY today per neurology recommendations  NPO pending RAYRAY  Monitor on tele  Follow up thrombosis panel, pending  Atorvastatin 80 mg daily, aspirin 81 mg daily  PT/OT/Speech eval  BP Goal: Normotension (-160)  Neurology is recommending loop recorder or Zio patch outpatient.  However patient is currently visiting from outside the [place].  Will discuss with patient and family about plans for outpatient follow-up.  Will need follow up in 6-8 weeks with neurovascular attending or AP  Neurology consulted, appreciate recommendations    Hypertension  Assessment & Plan  Blood Pressure: 141/84  Patient reports history of hypertension, is on 1 medication at home but does not recall the name.  The patient's stroke symptoms have been going on for 1 week. Do not need to  allow for permissive HTN.     Plan:  Monitor blood pressure.   BP Goal: Normotension (-160)  PRN Hydralazine for SBP >160    Hyperlipidemia  Assessment & Plan  Lab Results   Component Value Date    CHOLESTEROL 282 (H) 2024    TRIG 88 2024    HDL 59 2024    LDLCALC 205 (H) 2024     Atorvastatin 80 mg daily    Intractable hiccups  Assessment & Plan  Possibly related to CVA  Thorazine started , with resolution of hiccups, however may have contributed to episode of lethargy overnight -  Thorazine changed from scheduled to PRN  Hiccups resolved     Plan:   Continue Thorazine PRN    Constipation  Assessment & Plan  Patient reports last BM 2-3 days PTA    Plan:  Senna 8.6mg daily  Miralax daily PRN             VTE Pharmacologic Prophylaxis: VTE Score: 7 High Risk (Score >/= 5) - Pharmacological DVT Prophylaxis Ordered: enoxaparin (Lovenox). Sequential Compression Devices Ordered.    Mobility:   Basic Mobility Inpatient Raw Score: 19  JH-HLM Goal: 6: Walk 10 steps or more  JH-HLM Achieved: 6: Walk 10 steps or more  JH-HLM Goal achieved. Continue to encourage appropriate mobility.    Patient Centered Rounds: {Pt Centered Care Rounds:90279}  Discussions with Specialists or Other Care Team Provider: ***    Education and Discussions with Family / Patient: {Family Communication:34919}    Current Length of Stay: 3 day(s)  Current Patient Status: Inpatient   Discharge Plan: {Discharge Plan:71185}    Code Status: Level 1 - Full Code    Subjective:   ***    Objective:     Vitals:   Temp (24hrs), Av °F (36.7 °C), Min:97.6 °F (36.4 °C), Max:98.6 °F (37 °C)    Temp:  [97.6 °F (36.4 °C)-98.6 °F (37 °C)] 98.6 °F (37 °C)  HR:  [58-76] 58  Resp:  [18] 18  BP: (116-141)/(73-84) 141/84  SpO2:  [97 %-100 %] 100 %  Body mass index is 25.14 kg/m².     Input and Output Summary (last 24 hours):     Intake/Output Summary (Last 24 hours) at 2024 0816  Last data filed at 2024 0115  Gross per  24 hour   Intake 120 ml   Output --   Net 120 ml       Physical Exam:   Physical Exam ***    Additional Data:     Labs:  Results from last 7 days   Lab Units 05/14/24  0548   WBC Thousand/uL 4.75   HEMOGLOBIN g/dL 13.6   HEMATOCRIT % 40.0   PLATELETS Thousands/uL 214   SEGS PCT % 53   LYMPHO PCT % 35   MONO PCT % 8   EOS PCT % 3     Results from last 7 days   Lab Units 05/13/24  0528 05/12/24  0527 05/11/24  1402   SODIUM mmol/L 137   < > 136   POTASSIUM mmol/L 4.5   < > 3.4*   CHLORIDE mmol/L 103   < > 103   CO2 mmol/L 25   < > 27   BUN mg/dL 12   < > 15   CREATININE mg/dL 1.02   < > 0.86   ANION GAP mmol/L 9   < > 6   CALCIUM mg/dL 9.5   < > 8.9   ALBUMIN g/dL  --   --  4.0   TOTAL BILIRUBIN mg/dL  --   --  0.71   ALK PHOS U/L  --   --  71   ALT U/L  --   --  23   AST U/L  --   --  19   GLUCOSE RANDOM mg/dL 89   < > 99    < > = values in this interval not displayed.             Results from last 7 days   Lab Units 05/12/24  0527   HEMOGLOBIN A1C % 5.0           Lines/Drains:  Invasive Devices       Peripheral Intravenous Line  Duration             Peripheral IV 05/11/24 Left Antecubital 2 days                      Telemetry:  Telemetry Orders (From admission, onward)               24 Hour Telemetry Monitoring  Continuous x 24 Hours (Telem)        Expiring   Question:  Reason for 24 Hour Telemetry  Answer:  TIA/Suspected CVA/ Confirmed CVA                     Telemetry Reviewed: Normal Sinus Rhythm  Indication for Continued Telemetry Use: Acute CVA             Imaging: No new imaging    Recent Cultures (last 7 days):         Last 24 Hours Medication List:   Current Facility-Administered Medications   Medication Dose Route Frequency Provider Last Rate    acetaminophen  650 mg Oral Q6H PRN Shana Weinstein MD      aspirin  81 mg Oral Daily Kwadwo Crowley DO      atorvastatin  80 mg Oral QPM Kwadwo Crowley DO      chlorproMAZINE  25 mg Oral Q6H PRN Shana Weinstein MD      enoxaparin  40 mg  Subcutaneous Q24H WILLIAM Shana Weinstein MD      hydrALAZINE  10 mg Intravenous Q6H PRN Shaan Weinstein MD      ondansetron  4 mg Intravenous Q6H PRN Shana Weinstein MD      polyethylene glycol  17 g Oral Daily PRN Shana Weinstein MD      senna  1 tablet Oral Daily Shana Weinstein MD          Today, Patient Was Seen By: Shana Weinstein MD    **Please Note: This note may have been constructed using a voice recognition system.**

## 2024-05-14 NOTE — ASSESSMENT & PLAN NOTE
Blood Pressure: 126/84  Patient reports history of hypertension, is on 1 medication at home but does not recall the name.  The patient's stroke symptoms have been going on for 1 week. Do not need to allow for permissive HTN.     Plan:  Blood pressure well controlled without antihypertensives while inpatient  Monitor BP at home to bring to PCP visit. Sister confirmed they have BP monitor at home.   Establish care with PCP within 1 week - referral to [facility] provided

## 2024-05-17 DIAGNOSIS — I63.9 CEREBROVASCULAR ACCIDENT (CVA), UNSPECIFIED MECHANISM (HCC): Primary | ICD-10-CM

## 2024-05-17 LAB
B2 GLYCOPROT1 IGA SERPL IA-ACNC: 3.6
B2 GLYCOPROT1 IGG SERPL IA-ACNC: 0.9
B2 GLYCOPROT1 IGM SERPL IA-ACNC: <2.4
CARDIOLIPIN IGA SER IA-ACNC: 2.5
CARDIOLIPIN IGG SER IA-ACNC: 1.2
CARDIOLIPIN IGM SER IA-ACNC: 2

## 2024-05-20 ENCOUNTER — TELEPHONE (OUTPATIENT)
Dept: NEUROLOGY | Facility: CLINIC | Age: 49
End: 2024-05-20

## 2024-05-20 NOTE — TELEPHONE ENCOUNTER
Post CVA Discharge Follow Up  Hospitalization: 5/11/24-5/14/24    Called patient. Since discharge, he denies experiencing any new or worsening stroke-like symptoms. Reports he is doing okay and slowly recovering.     Patient reports he continues to have the following symptoms: aphasia. He claims his speech has improved since discharge.     He is ambulating independently as well as preforming his own ADLs. Patient manages his own medications, appointments, and affairs. He has support from his sister.     Reviewed appointments - patient is scheduled to see the PCP tomorrow. Reminded patient to also follow up with cardiology for the heart monitor. Looks like cardiology reached out on 5/14/24 and left a voice message to schedule. Per notes, a list of ST locations was provided to the patient at time of discharge.    It appears a referral to complex care management program was placed by the cardiology office.     Offered to schedule stroke hospital follow up appointment, patient is agreeable to this. Scheduled appointment. Placed patient on the wait list. Mailed appointment reminder card.     Reviewed medications with him. There have not been any medication changes since discharge from the hospital. Reports having no difficulties obtaining medications. Reports he is taking as prescribed with no medication side effects or signs of bleeding.    During this call, we reviewed stroke-like symptoms, personal risk factors and management, medications.    As for risk factors, patient reports monitoring his BP at home and how it has been around 130/74.   He is a non smoker.   Encouraged patient to follow a well balanced diet.     All of his question were addressed. At the conclusion of the conversation, patient denies having any further questions or concerns.

## 2024-05-21 ENCOUNTER — OFFICE VISIT (OUTPATIENT)
Dept: INTERNAL MEDICINE CLINIC | Facility: CLINIC | Age: 49
End: 2024-05-21

## 2024-05-21 ENCOUNTER — TELEPHONE (OUTPATIENT)
Dept: NEUROLOGY | Facility: CLINIC | Age: 49
End: 2024-05-21

## 2024-05-21 VITALS
TEMPERATURE: 97.6 F | SYSTOLIC BLOOD PRESSURE: 138 MMHG | OXYGEN SATURATION: 98 % | HEIGHT: 67 IN | RESPIRATION RATE: 16 BRPM | BODY MASS INDEX: 26.06 KG/M2 | DIASTOLIC BLOOD PRESSURE: 80 MMHG | HEART RATE: 63 BPM | WEIGHT: 166 LBS

## 2024-05-21 DIAGNOSIS — Z59.89 DOES NOT HAVE HEALTH INSURANCE: ICD-10-CM

## 2024-05-21 DIAGNOSIS — I63.81 CEREBROVASCULAR ACCIDENT (CVA) OF LEFT BASAL GANGLIA (HCC): Primary | ICD-10-CM

## 2024-05-21 DIAGNOSIS — R06.6 INTRACTABLE HICCUPS: ICD-10-CM

## 2024-05-21 DIAGNOSIS — R03.0 TEMPORARY HIGH BLOOD PRESSURE: ICD-10-CM

## 2024-05-21 DIAGNOSIS — Z11.59 ENCOUNTER FOR HEPATITIS C SCREENING TEST FOR LOW RISK PATIENT: ICD-10-CM

## 2024-05-21 DIAGNOSIS — E78.5 HYPERLIPIDEMIA, UNSPECIFIED HYPERLIPIDEMIA TYPE: ICD-10-CM

## 2024-05-21 PROBLEM — Z59.71 DOES NOT HAVE HEALTH INSURANCE: Status: ACTIVE | Noted: 2024-05-21

## 2024-05-21 PROCEDURE — 99204 OFFICE O/P NEW MOD 45 MIN: CPT | Performed by: INTERNAL MEDICINE

## 2024-05-21 RX ORDER — CHLORPROMAZINE HYDROCHLORIDE 25 MG/1
25 TABLET, FILM COATED ORAL 3 TIMES DAILY PRN
Qty: 60 TABLET | Refills: 1 | Status: SHIPPED | OUTPATIENT
Start: 2024-05-21

## 2024-05-21 RX ORDER — ATORVASTATIN CALCIUM 80 MG/1
80 TABLET, FILM COATED ORAL EVERY EVENING
Qty: 90 TABLET | Refills: 1 | Status: SHIPPED | OUTPATIENT
Start: 2024-05-21 | End: 2024-11-17

## 2024-05-21 SDOH — ECONOMIC STABILITY - INCOME SECURITY: OTHER PROBLEMS RELATED TO HOUSING AND ECONOMIC CIRCUMSTANCES: Z59.89

## 2024-05-21 NOTE — ASSESSMENT & PLAN NOTE
found while hospitalized for his CVA  Continue atorvastatin 80 mg daily  Recheck lipid panel in about 3 months if still in the country, may need additional medications  Information given on hyperlipidemia and Mediterranean diet

## 2024-05-21 NOTE — TELEPHONE ENCOUNTER
----- Message from Audrey JOHNSON sent at 5/21/2024 11:18 AM EDT -----  Regarding: No Insurance coverage  Hello,    This patient was recently admitted for a CVA at [facility].  He has no insurance coverage and we are trying to get him referred to a SW.  Currently general cardiology does not have a SW and was wondering if neuro would assist as this pt will primarily be under Neuro. Cardiology was brought on due to need of zio patch, however neuro is primarily managing post hospital discharge.      With kind regards,   Audrey

## 2024-05-21 NOTE — ASSESSMENT & PLAN NOTE
Resides in [facility], was visiting family when his CVA occurred and will be spending time in the [place] to figure out his health issues  Referral to social work to assist with finances

## 2024-05-21 NOTE — TELEPHONE ENCOUNTER
MSW discussed referral with [facility]  - ok to contact [facility] to check status of status of MA application from hospital admission, but otherwise  involvement should be minimal since patient has not established with our office as an outpatient (hospital follow-up scheduled for 7/16/24).     MSW phoned [facility] at [phone] to check status of MA application (it appears that [facility] just received the referral on 5/13/24). MSW was transferred to Amada's extension. No answer. MSW left a message requesting callback. Awaiting same.

## 2024-05-21 NOTE — ASSESSMENT & PLAN NOTE
Suspected secondary to CVA, Thorazine worked well while hospitalized  Will give prescription for Thorazine

## 2024-05-21 NOTE — ASSESSMENT & PLAN NOTE
Blood pressure at home has been less than 120/80  Counseled that if his blood pressure were to go above 130/90 he would need to contact us to start antihypertensives

## 2024-05-21 NOTE — ASSESSMENT & PLAN NOTE
Expressive aphasia improving, however intractable hiccups started again yesterday  Continue atorvastatin 80 mg, aspirin 81 mg both daily  Information given for cardiology follow-up and Zio patch  Encouraged neurology follow-up  Speech pathology referral to help with expressive aphasia

## 2024-05-21 NOTE — PROGRESS NOTES
Ambulatory Visit  Name: Galdino Gaines      : 1975      MRN: [id number]  Encounter Provider: Kwadwo Crowley DO  Encounter Date: 2024   Encounter department: [facility]    Assessment & Plan   1. Cerebrovascular accident (CVA) of left basal ganglia (HCC)  Assessment & Plan:  Expressive aphasia improving, however intractable hiccups started again yesterday  Continue atorvastatin 80 mg, aspirin 81 mg both daily  Information given for cardiology follow-up and Zio patch  Encouraged neurology follow-up  Speech pathology referral to help with expressive aphasia  Orders:  -     atorvastatin (LIPITOR) 80 mg tablet; Take 1 tablet (80 mg total) by mouth every evening  -     aspirin (ECOTRIN LOW STRENGTH) 81 mg EC tablet; Take 1 tablet (81 mg total) by mouth daily  -     Ambulatory Referral to Speech Therapy; Future  -     chlorproMAZINE (THORAZINE) 25 mg tablet; Take 1 tablet (25 mg total) by mouth 3 (three) times a day as needed (hiccups)  -     Ambulatory Referral to Cardiology; Future  2. Does not have health insurance  Assessment & Plan:  Resides in [place], was visiting family when his CVA occurred and will be spending time in the [place] to figure out his health issues  Referral to social work to assist with finances  Orders:  -     Ambulatory Referral to Social Work Care Management Program; Future  3. Temporary high blood pressure  Assessment & Plan:  Blood pressure at home has been less than 120/80  Counseled that if his blood pressure were to go above 130/90 he would need to contact us to start antihypertensives  4. Hyperlipidemia, unspecified hyperlipidemia type  Assessment & Plan:   found while hospitalized for his CVA  Continue atorvastatin 80 mg daily  Recheck lipid panel in about 3 months if still in the country, may need additional medications  Information given on hyperlipidemia and Mediterranean diet  5. Intractable hiccups  Assessment &  Plan:  Suspected secondary to CVA, Thorazine worked well while hospitalized  Will give prescription for Thorazine  Orders:  -     chlorproMAZINE (THORAZINE) 25 mg tablet; Take 1 tablet (25 mg total) by mouth 3 (three) times a day as needed (hiccups)  6. Encounter for hepatitis C screening test for low risk patient  -     Hepatitis C antibody; Future    We discussed colon cancer screening, however patient does not plan to stay in [place] long-term.  We came to consensus to defer this for now as he is also trying to figure out how to pay for his health care while here.       History of Present Illness     Galdino Gaines presents today to establish care and for hospital follow-up.  He was hospitalized from 5/11 - 5/14 for left basal ganglia CVA.  He presented with intractable hiccups and some expressive aphasia.  Found to have a , otherwise no other immediate risk factors could be ascertained.  No arrhythmias while on telemetry, A1c 5%, no high blood pressure while in the hospital, thrombosis panel negative.  Since discharge he feels he is slowly improving and his expressive aphasia is improving well.  His intractable hiccups did return yesterday 5/20.  They are not as frequent as they were in the hospital but are still continuous and bothersome.  Denies any recurrent symptoms otherwise and no focal deficits.  He has been taking his meds as prescribed.    Patient is a resident of [facility] and was visiting family here in the [facility] when his CVA occurred.  He will be staying in the [place] temporarily to figure out his health concerns prior to returning to [facility].  He has an appointment scheduled with neurology and is working to see cardiology.  He was tested for HIV about 1 year ago and was negative.  He does not think he is ever been tested for hepatitis.  He has never been tested for colon cancer but also does not have health insurance so would like to defer this for now.        Review of  "Systems   Neurological:  Positive for speech difficulty (Improved from CVA).        Intractable hiccups (+)   All other systems reviewed and are negative.      Objective     /80 (BP Location: Left arm, Patient Position: Sitting, Cuff Size: Adult)   Pulse 63   Temp 97.6 °F (36.4 °C) (Tympanic)   Resp 16   Ht 5' 7\" (1.702 m)   Wt 75.3 kg (166 lb)   SpO2 98%   BMI 26.00 kg/m²     Physical Exam  Constitutional:       Appearance: He is normal weight. He is not ill-appearing.   HENT:      Mouth/Throat:      Mouth: Mucous membranes are moist.      Pharynx: Oropharynx is clear.   Cardiovascular:      Rate and Rhythm: Normal rate and regular rhythm.      Heart sounds: No murmur heard.  Pulmonary:      Effort: Pulmonary effort is normal.      Breath sounds: Normal breath sounds.   Abdominal:      General: Abdomen is flat. There is no distension.      Palpations: Abdomen is soft.   Musculoskeletal:      Right lower leg: No edema.      Left lower leg: No edema.   Skin:     General: Skin is warm and dry.   Neurological:      Mental Status: He is alert.      Cranial Nerves: No cranial nerve deficit.      Comments: Speech appears grossly normal to me, however patient and family member state not quite back to baseline  Intractable hiccups   Psychiatric:         Mood and Affect: Mood normal.           "

## 2024-05-22 ENCOUNTER — PATIENT OUTREACH (OUTPATIENT)
Dept: INTERNAL MEDICINE CLINIC | Facility: CLINIC | Age: 49
End: 2024-05-22

## 2024-05-22 NOTE — TELEPHONE ENCOUNTER
MSW had yet to receive a callback from [employer] about the status of patient's application, so MSW phoned [employer] again at [phone] to check status of MA application. MSW spoke with Isabel, who transferred this writer's call to Melani. No answer and Melani's extension. MSW left a message requesting callback. Awaiting same.

## 2024-05-22 NOTE — PROGRESS NOTES
OPCM SW received referral due to lack of health insurance.  OPCM SW reviewed patient's chart.  Neurology SWCherelle just reached out yesterday for f/u regarding MA application that was submitted with [employer] 5/13.  Patient has not been to Neurology office in a lengthy period of time.  SW reached out to patient and left a detailed voicemail regarding the above.

## 2024-05-23 NOTE — TELEPHONE ENCOUNTER
ZOYA received a callback from Melani at [employer] this date ([phone]). Melani stated that they did receive the necessary documents from patient to submit the MA application. Melani stated that on 5/22, they requested the medical records and emergency letter from the hospital (they have not yet received same back). Melani stated that once they receive the medical records/emergency letter they will submit the MA application for review. Melani did advise that it can take up to 30-45 days to receive a determination. It is too soon to tell if patient will be approved for just emergency MA or ongoing MA.    ZOYA will follow.     ZOYA will alert Audrey at the cardiology office of above.

## 2024-05-29 ENCOUNTER — PATIENT OUTREACH (OUTPATIENT)
Dept: INTERNAL MEDICINE CLINIC | Facility: CLINIC | Age: 49
End: 2024-05-29

## 2024-05-29 NOTE — PROGRESS NOTES
OPCM SW reviewed patient's chart.  Neuro MSW is following for assistance with MA.  OPCM SW removed self from Care Team

## 2024-06-05 ENCOUNTER — TELEPHONE (OUTPATIENT)
Age: 49
End: 2024-06-05

## 2024-06-05 NOTE — TELEPHONE ENCOUNTER
Patient's sister, Meli, called and stated her brother was supposed to have a  reach out to him when he was at the practice on 05/21, but nobody has called.  Please advise.

## 2024-06-05 NOTE — TELEPHONE ENCOUNTER
I called and spoke with sister regarding the , Jaclyn has called and left message on patient's phone. Neuro  will be working with patient. Patient has appt for Neurology in July and I informed sister of date and time. Verbalizes understanding of information given.

## 2024-06-07 NOTE — TELEPHONE ENCOUNTER
MSW attempted to reach Melani at [employer] to check the status of patient's application. No answer at Melani's extension. MSW left a message requesting callback. Awaiting same.

## 2024-06-11 NOTE — TELEPHONE ENCOUNTER
ZOYA received a call from Melani at [facility]. She stated that patient was approved for emergency Medicaid from 5/11-5/14. Melani stated that patient will not qualify for ongoing Medicaid as patient does not meet citizenship requirements. Melani stated that patient can contact [facility] Financial Assistance to see what he can qualify for through them. Melani stated that if patient has future ER visits/hospitalizations, that he will need to reapply for emergency Medicaid via [facility].     ZOYA phoned the [facility] Financial Counselors office at [phone] and spoke with Annmarie. She will mail patient a [facility] application.     Patient would benefit from attending a [facility] as they can offer additional financial support.

## 2024-06-12 NOTE — PROGRESS NOTES
Speech-Language Pathology Initial Evaluation    Today's date: 2024  Patient’s name: Galdino Gaines  : 1975  MRN: [id number]  Safety measures: h/o CVA  Referring provider: Kwadwo Crowley*    Encounter Diagnosis     ICD-10-CM    1. Aphasia, late effect of cerebrovascular disease  I69.920       2. Cerebrovascular accident (CVA) of left basal ganglia (HCC)  I63.81 Ambulatory Referral to Speech Therapy        Assessment:  -Patient presents with moderate aphasia s/p CVA. Verbal expression deficits can be c/b difficulty with word retrieval and verbal fluency. Patient is able to communicate with mostly complete, relevant sentences with frequent hesitations and word finding difficulty present. Verbal repetition is intact. Graphic expression deficits can be c/b difficulty with accuracy of spelling and reduced organization of sentences. Auditory comprehension deficits can be c/b difficulty with answering complex yes/no questions and following commands, as well as reduced processing speed. Reading comprehension deficits can be c/b inconsistent difficulty with comprehension of sentences and paragraphs. Patient would benefit from outpatient skilled Speech Therapy services to promote positive communication interactions with both familiar & unfamiliar listeners, for further education/training compensatory strategies, to follow directions within functional activities, to increase participation in meaningful activities, to facilitate overall improved quality of life, to reduce caregiver burden, and to receive instruction on HEP.      Short-term goals:   Patient will be educated on word finding strategies (i.e., circumlocution) for improved generative naming and verbal expression skills (to be achieved in 1-2 weeks).     Patient will complete concrete and abstract categorization tasks to 90% accuracy to facilitate improved generative naming skills (to be achieved in 8-10 weeks).     Patient will complete word  generation tasks (e.g., verbal fluency, categorization, analogies, synonyms/antonyms, etc.) with 80% accuracy using word finding strategies to facilitate improved word retrieval skills (to be achieved in 8-10 weeks).     Patient will name up to 5 features to describe a person/place/thing with 80% accuracy to facilitate improved utilization of circumlocution strategy (to be achieved in 8-10 weeks).     Patient will complete picture description tasks using language organization strategies with 80% accuracy to facilitate improved utilization of circumlocution strategy (to be achieved in 8-10 weeks).     Patient will answer complex verbal yes/no questions with 80% accuracy to facilitate increased auditory comprehension skills (to be achieved in 8-10 weeks).     Patient will follow multistep verbal directions with 80% accuracy to facilitate increased auditory comprehension skills (to be achieved in 8-10 weeks).     Patient will complete writing tasks at the sentence level with 80% accuracy to increase writing skills within FLE (to be achieved in 8-10 weeks).     Patient will read short paragraphs and answer multiple choice, yes/no, fill in the blank, and open-ended questions with 80% accuracy to facilitate increased comprehension of functional reading material (to be achieved in 8-10 weeks).     Long-term goals:  Patient will demonstrate improved expressive and receptive language skills from moderate to mild-WNL during structured and unstructured tasks (to be achieved by discharge).     Patient will improve ability to facilitate communication to meet needs including use of compensatory strategies to promote meaningful interactions for improved quality of life and maximize level of independence (to be achieved by discharge).        Plan:  Patient would benefit from outpatient skilled Speech Therapy services: Speech-language therapy    Frequency: 2x weekly  Duration: 3 months    Intervention certification from:  "6/20/2024  Intervention certification to: 09/20/2024      Subjective:  History of present illness: Patient is a 48 y.o. male who was referred to outpatient skilled Speech Therapy services for an aphasia evaluation. Patient presented to the hospital with 1-week h/o intractable hiccups and significant expressive/receptive aphasia on 05/11/2024. Patient was hospitalized from 05/11/2024 to 05/14/2024 for L basal ganglia CVA. Per chart review, patient was \"...found to have a , otherwise no other immediate risk factors could be ascertained. No arrhythmias while on telemetry, A1c 5%, no high blood pressure while in the hospital, thrombosis panel negative. Since discharge he feels he is slowly improving and his expressive aphasia is improving well. His intractable hiccups did return yesterday 5/20. They are not as frequent as they were in the hospital but are still continuous and bothersome. Denies any recurrent symptoms otherwise and no focal deficits. He has been taking his meds as prescribed...\"    Since discharged from the hospital, patient feels like he is improving. Per noah review, patient is a resident of [facility] and was visiting his sister here in the [facility] when his CVA occurred. It was noted that patient will be staying in the [place] temporarily to figure out his health concerns prior to returning home to [facility]. Patient was referred to social work. Patient is scheduled for follow-ups with neurology and cardiology.    Inpatient ST evaluation note (05/13/2024): \"...Impression:  Moderate receptive and expressive language deficits characterized by difficulty w/ comprehension of yes/no questions, body ID, following multi- step commands and reading comprehension, as well as various naming tasks and written expression. Overall speech is fluent but with word finding difficulty...\"    During today's assessment, patient reported having difficulty \"recollecting things\". Patient stated, \"I have forgotten " "so many things...it's become a challenge.\" When asked by clinician to elaborate, patient indicated, \"I know what I want to say, but I can't recollect the word or sentence. It takes time.\" Patient indicated that since his stroke, he has noticed that he has been experiencing trouble with verbal and graphic (written) expression. Patient denied any concerns with auditory or reading comprehension, memory/cognitive changes, or dysphagia s/p CVA. Patient reported that he is bilingual (primary language is Twi--patient is from [facility]; he is also fluent in English).    Patient's goal(s): \"to recollect and be fast with speaking, and to be able to write well\"    Hearing: WFL for testing  Vision: WFL for testing (wears corrective lens)    Home environment/lifestyle: Currently residing with his sister, her spouse, and their 2 children (Patient is a resident of [facility] and was visiting his sister here in the [place] [facility] when his CVA occurred. It was noted that patient will be staying in the [place] temporarily to figure out his health concerns prior to returning home to [facility].)    Highest level of education: Master's degree in Finance & Accounting (all of his higher education has been in the English language--speaks English fluently at baseline)    Vocational status: Full-time (hospitals ) (Patient reported that he was on leave when he came to the [place]. Patient hasn't returned back to work yet.)      Objective (testing):  The Western Aphasia Battery-Revised (WAB-R) is designed to evaluate a patient's language function following CVA, dementia, or other acquired neurological disorder. It measures a patient’s linguistic skills, such as speech content, fluency, auditory comprehension, repetition, naming, reading, and writing. The WAB-R also measures a patient’s nonlinguistic skills, including drawing, calculation, block design, and apraxia. The purpose of this standardized assessment is to (1) determine the " presence, severity, and type of aphasia; (2) measure the patient's level of performance to provide a baseline for detecting change over time; (3) provide a comprehensive assessment of the patient's language assets and deficits in order to guide treatment and management; and (4) infer the location and etiology of the lesion causing aphasia. 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    Due to time constraints, only portions of the standardized assessment were administered on this date of service.    PART 2: Score:   -Reading Score:    -Writin.2   -Apraxia: DNT   -Constructional, Visuospatial, & Calculation: DNT        Score:   *Aphasia Quotient (AQ): 83/100   *Language Quotient (LQ): 77.4/100   *Cortical Quotient (CQ): DNT       Treatment:  N/A      Visit Tracking:  POC   Expires Auth Expiration Date ST Visit Limit   2024 PENDING PENDING          Visit/Unit Tracking:  Auth Status Date 24   MA PENDING Used 1    Remaining PENDING       Intervention Comments:  -Initial evaluation/administration of standardized test with patient (70 minutes)  -Scoring/interpretation of the standardized test for the development of POC, and write-up of the report (60 minutes)

## 2024-06-13 NOTE — TELEPHONE ENCOUNTER
"MSW phoned patient at [phone] to make him aware that as per PATHS he will only qualify for Emergency Medical Assistance as he is does not meet citizenship requirements. Patient mentioned that he something about getting an extension, but did not have details regarding same. MSW inquired if this writer should reach out to his sister, Meli, who appears to be assisting him with insurance related issues (as per chart review). Patient was agreeable to this writer contacting Meli.    MSW phoned Meli at [phone]. Robert stated that patient had received a letter stating that patient was approved for coverage until 6/15/24. Meli stated that there was an option to appeal this if they disagreed. Meli stated that they did write a letter to appeal the decision and request an extension as patient will still require medical attention. Meli stated that they only submitted this letter about 3 days ago so they have not yet received any response about the appeal/request for extension. MSW explained that they will need to await the [employer] determination regarding same.     MSW did advise that even if patient does get an extension, it will likely still be temporary as patient does not meet citizenship requirements for ongoing Medicaid. Meli stated that patient has only been in the [place] since February (came from [place]).     MSW did advise that the [facility] Financial Counselors have mailed patient an application for [facility] Financial Assistance that patient can complete/return to see if he can qualify for any assistance through [facility] for imaging, bloodwork, therapies, etc. Meli will watch for this application and will complete/return it ASAP.    MSW also advised that it would be advantageous to get patient connected to the [facility] as they offer additional services/grants to the   \"underserved\"/who do not meet cititzenship criteria for ongoing Medicaid. MSW reviewed the [facility] locations in [place], " and Meli stated that the [facility] location is the closest - so MSW provided the address and phone number as follows:    [address], [place] [address]  Services and Telephone numbers:  Primary/Family Medicine, [phone]    Meli stated that patient did already see a PCP in [place], but it is appears that it is an internal medicine office. MSW reiterated that [facility] can offer additional services/grants than what an internal medicine office can offer.     MSW will follow to ensure that patient complete the [facility] Financial Assistance application and gets connected to the [facility].

## 2024-06-17 NOTE — PROGRESS NOTES
Cardiology Follow Up    Galdino Gaines  1975  [id number]  [facility]  [address]  [place] [id number] [phone]    1. Cerebrovascular accident (CVA) of left basal ganglia (HCC)  Ambulatory Referral to Cardiology    Ambulatory Referral to Cardiology    Lipid panel    atorvastatin (LIPITOR) 80 mg tablet    aspirin (ECOTRIN LOW STRENGTH) 81 mg EC tablet      2. Hyperlipidemia, unspecified hyperlipidemia type  Lipid panel          Interval History:   Mr Galdino Gaines was admitted to [facility] on 5/11 - 5/14/24 with CVA in left basal ganglia.  Presented to the emergency room with 1 week history of intractable hiccups.  On admission noted to have significant expressive aphasia as well as impaired finger-to-nose and rapid alternating movements on exam.  No stroke alert was called because symptoms were ongoing for over a week.  TTE showed subacute basal ganglia infarct and MRI of the head confirmed infarct in the coronary radiata extending to the basal ganglia.  Lipid panel showed total cholesterol 282, TG 88, HDL 59, .  Started on aspirin and atorvastatin.  He was treated with Thorazine for hiccups which resolved.  Cardiology consulted.  RAYRAY showed left ventricle cavity size normal wall thickness normal LVEF 65% systolic function normal wall motion is normal.  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  Not suggestive of a mass.  Not felt to be an embolic source.  Atrial septum no patent foramen ovale detected left atrial appendage normal function no thrombus.  Right Ventre there is size normal systolic function normal.  Left atrium right atrium normal in size.  Aortic valve trileaflet no evidence of regurgitation no evidence of stenosis.  No evidence of  significant valvular disease.  Ambulatory event recorder ordered at discharge  Patient does not have health insurance.      Galdino presents to our office for a follow up visit.  He is accompanied by his sister.  Jesus does not have health insurance he has not received the extended ambulatory Holter monitor.  Not willing to pay out-of-pocket.  Annual denies chest pain palpitations lightheadedness or dizziness.  He admits to slight shortness of breath with walking quickly or working quickly.  Mitts to due to residual defect of his stroke expressive aphasia and memory difficulties otherwise no physical weakness    Medical History   Primary Cardiologist  Hypertension  Hyperlipidemia 5/12/24 , TG 88, HDL 59,        Patient Active Problem List   Diagnosis    Cerebrovascular accident (CVA) of left basal ganglia (HCC)    Temporary high blood pressure    Intractable hiccups    Hyperlipidemia    Does not have health insurance     Past Medical History:   Diagnosis Date    Hypertension      Social History     Socioeconomic History    Marital status: Single     Spouse name: Not on file    Number of children: Not on file    Years of education: Not on file    Highest education level: Not on file   Occupational History    Not on file   Tobacco Use    Smoking status: Never     Passive exposure: Never    Smokeless tobacco: Never   Vaping Use    Vaping status: Never Used   Substance and Sexual Activity    Alcohol use: Never    Drug use: Never    Sexual activity: Not Currently   Other Topics Concern    Not on file   Social History Narrative    Not on file     Social Determinants of Health     Financial Resource Strain: Not on file   Food Insecurity: Not on file   Transportation Needs: Not on file   Physical Activity: Not on file   Stress: Not on file   Social Connections: Not on file   Intimate Partner Violence: Not on file   Housing Stability: Not on file      No family history on file.  No past surgical history on  file.    Current Outpatient Medications:     aspirin (ECOTRIN LOW STRENGTH) 81 mg EC tablet, Take 1 tablet (81 mg total) by mouth daily, Disp: 90 tablet, Rfl: 1    atorvastatin (LIPITOR) 80 mg tablet, Take 1 tablet (80 mg total) by mouth every evening, Disp: 90 tablet, Rfl: 1    chlorproMAZINE (THORAZINE) 25 mg tablet, Take 1 tablet (25 mg total) by mouth 3 (three) times a day as needed (hiccups), Disp: 60 tablet, Rfl: 1  No Known Allergies    Labs:  Admission on 05/11/2024, Discharged on 05/14/2024   Component Date Value    Sodium 05/11/2024 136     Potassium 05/11/2024 3.4 (L)     Chloride 05/11/2024 103     CO2 05/11/2024 27     ANION GAP 05/11/2024 6     BUN 05/11/2024 15     Creatinine 05/11/2024 0.86     Glucose 05/11/2024 99     Calcium 05/11/2024 8.9     AST 05/11/2024 19     ALT 05/11/2024 23     Alkaline Phosphatase 05/11/2024 71     Total Protein 05/11/2024 6.7     Albumin 05/11/2024 4.0     Total Bilirubin 05/11/2024 0.71     eGFR 05/11/2024 102     Lipase 05/11/2024 28     WBC 05/11/2024 6.31     RBC 05/11/2024 4.45     Hemoglobin 05/11/2024 13.2     Hematocrit 05/11/2024 38.9     MCV 05/11/2024 87     MCH 05/11/2024 29.7     MCHC 05/11/2024 33.9     RDW 05/11/2024 12.3     MPV 05/11/2024 9.2     Platelets 05/11/2024 206     nRBC 05/11/2024 0     Segmented % 05/11/2024 63     Immature Grans % 05/11/2024 0     Lymphocytes % 05/11/2024 25     Monocytes % 05/11/2024 9     Eosinophils Relative 05/11/2024 3     Basophils Relative 05/11/2024 0     Absolute Neutrophils 05/11/2024 3.95     Absolute Immature Grans 05/11/2024 0.02     Absolute Lymphocytes 05/11/2024 1.57     Absolute Monocytes 05/11/2024 0.59     Eosinophils Absolute 05/11/2024 0.16     Basophils Absolute 05/11/2024 0.02     Ventricular Rate 05/11/2024 59     Atrial Rate 05/11/2024 59     IA Interval 05/11/2024 158     QRSD Interval 05/11/2024 124     QT Interval 05/11/2024 404     QTC Interval 05/11/2024 399     P Axis 05/11/2024 46     QRS Axis  05/11/2024 88     T Wave [place] 05/11/2024 -2     hs TnI 0hr 05/11/2024 <2     TSH 3RD GENERATON 05/11/2024 1.404     RAA A4C 05/12/2024 11.9     LA Volume Index (BP) 05/12/2024 19.9     MV Peak A Darren 05/12/2024 0.75     MV stenosis pressure 1/2* 05/12/2024 99     MV Peak E Darren 05/12/2024 44     E wave deceleration time 05/12/2024 341     E/A ratio 05/12/2024 0.59     MV valve area p 1/2 meth* 05/12/2024 2.22     RA 2D Volume 05/12/2024 25.0     RVID d 05/12/2024 3.0     A4C EF 05/12/2024 72     Left ventricular stroke * 05/12/2024 65.00     IVSd 05/12/2024 1.00     Tricuspid annular plane * 05/12/2024 2.40     Ao root 05/12/2024 2.80     LVPWd 05/12/2024 0.80     LA size 05/12/2024 2.7     LA volume (BP) 05/12/2024 37     FS 05/12/2024 47     LVIDS 05/12/2024 2.30     IVS 05/12/2024 1     LVIDd 05/12/2024 4.30     LA length (A2C) 05/12/2024 4.80     LEFT VENTRICLE SYSTOLIC * 05/12/2024 18     LV DIASTOLIC VOLUME (MOD* 05/12/2024 83     Left Atrium Area-systoli* 05/12/2024 15.2     Left Atrium Area-systoli* 05/12/2024 14     MV E' Tissue Velocity Se* 05/12/2024 6     LVSV, 2D 05/12/2024 65     BSA 05/12/2024 1.86     LV EF 05/12/2024 70     Magnesium 05/11/2024 1.9     Cholesterol 05/12/2024 282 (H)     Triglycerides 05/12/2024 88     HDL, Direct 05/12/2024 59     LDL Calculated 05/12/2024 205 (H)     Hemoglobin A1C 05/12/2024 5.0     EAG 05/12/2024 97     WBC 05/12/2024 7.51     RBC 05/12/2024 4.95     Hemoglobin 05/12/2024 14.5     Hematocrit 05/12/2024 43.3     MCV 05/12/2024 88     MCH 05/12/2024 29.3     MCHC 05/12/2024 33.5     RDW 05/12/2024 12.5     MPV 05/12/2024 9.4     Platelets 05/12/2024 223     nRBC 05/12/2024 0     Segmented % 05/12/2024 70     Immature Grans % 05/12/2024 0     Lymphocytes % 05/12/2024 20     Monocytes % 05/12/2024 8     Eosinophils Relative 05/12/2024 2     Basophils Relative 05/12/2024 0     Absolute Neutrophils 05/12/2024 5.23     Absolute Immature Grans 05/12/2024 0.03     Absolute  Lymphocytes 05/12/2024 1.51     Absolute Monocytes 05/12/2024 0.57     Eosinophils Absolute 05/12/2024 0.14     Basophils Absolute 05/12/2024 0.03     Sodium 05/12/2024 137     Potassium 05/12/2024 4.3     Chloride 05/12/2024 103     CO2 05/12/2024 27     ANION GAP 05/12/2024 7     BUN 05/12/2024 12     Creatinine 05/12/2024 0.96     Glucose 05/12/2024 92     Calcium 05/12/2024 9.3     eGFR 05/12/2024 93     AntiThrombIN III Activity 05/12/2024 120     ANTICARDIOLIPIN IGG ANTI* 05/12/2024 1.2     ANTICARDIOLIPIN IGA ANTI* 05/12/2024 2.5     ANTICARDIOLIPIN IGM ANTI* 05/12/2024 2.0     PTT Lupus Anticoagulant 05/12/2024 39.8     Dilute Viper Venom Time 05/12/2024 34.1     DILUTE PROTHROMBIN TIME(* 05/12/2024 34.0     THROMBIN TIME (DRVW) 05/12/2024 17.6     DPT CONFIRM RATIO 05/12/2024 1.14     LUPUS REFLEX INTERPRETAT* 05/12/2024 Comment:     Protein C Activity 05/12/2024 >150 (H)     PROTEIN S ACTIVITY 05/12/2024 91     Protein S Ag, Total 05/12/2024 109     Protein S Ag, Free 05/12/2024 90     BETA 2 GLYCO 1 IGG 05/12/2024 0.9     BETA 2 GLYCO 1 IGA 05/12/2024 3.6     BETA 2 GLYCO 1 IGM 05/12/2024 <2.4     Sodium 05/13/2024 137     Potassium 05/13/2024 4.5     Chloride 05/13/2024 103     CO2 05/13/2024 25     ANION GAP 05/13/2024 9     BUN 05/13/2024 12     Creatinine 05/13/2024 1.02     Glucose 05/13/2024 89     Calcium 05/13/2024 9.5     eGFR 05/13/2024 86     WBC 05/13/2024 4.92     RBC 05/13/2024 4.89     Hemoglobin 05/13/2024 14.5     Hematocrit 05/13/2024 43.7     MCV 05/13/2024 89     MCH 05/13/2024 29.7     MCHC 05/13/2024 33.2     RDW 05/13/2024 12.3     MPV 05/13/2024 9.4     Platelets 05/13/2024 206     nRBC 05/13/2024 0     Segmented % 05/13/2024 46     Immature Grans % 05/13/2024 0     Lymphocytes % 05/13/2024 42     Monocytes % 05/13/2024 8     Eosinophils Relative 05/13/2024 3     Basophils Relative 05/13/2024 1     Absolute Neutrophils 05/13/2024 2.23     Absolute Immature Grans 05/13/2024 0.01      Absolute Lymphocytes 05/13/2024 2.08     Absolute Monocytes 05/13/2024 0.41     Eosinophils Absolute 05/13/2024 0.16     Basophils Absolute 05/13/2024 0.03     LV EF 05/14/2024 65     WBC 05/14/2024 4.75     RBC 05/14/2024 4.60     Hemoglobin 05/14/2024 13.6     Hematocrit 05/14/2024 40.0     MCV 05/14/2024 87     MCH 05/14/2024 29.6     MCHC 05/14/2024 34.0     RDW 05/14/2024 12.3     MPV 05/14/2024 9.4     Platelets 05/14/2024 214     nRBC 05/14/2024 0     Segmented % 05/14/2024 53     Immature Grans % 05/14/2024 0     Lymphocytes % 05/14/2024 35     Monocytes % 05/14/2024 8     Eosinophils Relative 05/14/2024 3     Basophils Relative 05/14/2024 1     Absolute Neutrophils 05/14/2024 2.54     Absolute Immature Grans 05/14/2024 0.01     Absolute Lymphocytes 05/14/2024 1.66     Absolute Monocytes 05/14/2024 0.36     Eosinophils Absolute 05/14/2024 0.14     Basophils Absolute 05/14/2024 0.04      Imaging: No results found.    Review of Systems:  Review of Systems   Respiratory:  Positive for shortness of breath.    Neurological:         Memory difficulties   Expressive aphasia    All other systems reviewed and are negative.      Physical Exam:  Physical Exam  Constitutional:       Appearance: Normal appearance.   HENT:      Head: Normocephalic.   Neck:      Vascular: No carotid bruit.   Cardiovascular:      Rate and Rhythm: Normal rate and regular rhythm.      Pulses: Normal pulses.      Heart sounds: Normal heart sounds.   Pulmonary:      Effort: Pulmonary effort is normal.      Breath sounds: Normal breath sounds.   Musculoskeletal:         General: Normal range of motion.      Cervical back: Normal range of motion and neck supple.      Right lower leg: No edema.      Left lower leg: No edema.   Skin:     General: Skin is warm and dry.      Capillary Refill: Capillary refill takes less than 2 seconds.   Neurological:      General: No focal deficit present.      Mental Status: He is alert and oriented to person,  place, and time.   Psychiatric:         Mood and Affect: Mood normal.         Behavior: Behavior normal.         Discussion/Summary:  # CVA in left basal ganglia with the present she will deficits of expressive aphasia and difficulty with memory.  Follow-up with neurology in the near future.  Continue on aspirin 81 mg daily, Lipitor 80 mg daily.  Ambulatory event monitor was ordered at discharge.   Jesus has not received it to date.  He does not have health insurance and is not willing to pay out-of-pocket.  He and his sister were instructed to call our office once insurance is obtained.  We can place it at that time.      # Hyperlipidemia 5/12/24 , TG 88, HDL 59,  continue on Lipitor 80 mg daily, heart healthy diet, follow fasting lipid profile in 3 months

## 2024-06-18 ENCOUNTER — OFFICE VISIT (OUTPATIENT)
Dept: CARDIOLOGY CLINIC | Facility: CLINIC | Age: 49
End: 2024-06-18

## 2024-06-18 VITALS
OXYGEN SATURATION: 98 % | WEIGHT: 163.8 LBS | HEART RATE: 72 BPM | DIASTOLIC BLOOD PRESSURE: 78 MMHG | BODY MASS INDEX: 25.71 KG/M2 | SYSTOLIC BLOOD PRESSURE: 128 MMHG | HEIGHT: 67 IN

## 2024-06-18 DIAGNOSIS — E78.5 HYPERLIPIDEMIA, UNSPECIFIED HYPERLIPIDEMIA TYPE: ICD-10-CM

## 2024-06-18 DIAGNOSIS — I63.81 CEREBROVASCULAR ACCIDENT (CVA) OF LEFT BASAL GANGLIA (HCC): Primary | ICD-10-CM

## 2024-06-18 PROCEDURE — 99215 OFFICE O/P EST HI 40 MIN: CPT | Performed by: NURSE PRACTITIONER

## 2024-06-18 RX ORDER — ATORVASTATIN CALCIUM 80 MG/1
80 TABLET, FILM COATED ORAL EVERY EVENING
Qty: 90 TABLET | Refills: 3 | Status: SHIPPED | OUTPATIENT
Start: 2024-06-18 | End: 2024-12-15

## 2024-06-20 ENCOUNTER — EVALUATION (OUTPATIENT)
Dept: SPEECH THERAPY | Facility: CLINIC | Age: 49
End: 2024-06-20
Payer: COMMERCIAL

## 2024-06-20 DIAGNOSIS — I63.81 CEREBROVASCULAR ACCIDENT (CVA) OF LEFT BASAL GANGLIA (HCC): ICD-10-CM

## 2024-06-20 DIAGNOSIS — I69.920 APHASIA, LATE EFFECT OF CEREBROVASCULAR DISEASE: Primary | ICD-10-CM

## 2024-06-20 PROCEDURE — 96105 ASSESSMENT OF APHASIA: CPT | Performed by: SPEECH-LANGUAGE PATHOLOGIST

## 2024-06-21 ENCOUNTER — OFFICE VISIT (OUTPATIENT)
Dept: FAMILY MEDICINE CLINIC | Facility: CLINIC | Age: 49
End: 2024-06-21

## 2024-06-21 ENCOUNTER — PATIENT OUTREACH (OUTPATIENT)
Dept: FAMILY MEDICINE CLINIC | Facility: CLINIC | Age: 49
End: 2024-06-21

## 2024-06-21 VITALS
OXYGEN SATURATION: 99 % | HEIGHT: 67 IN | DIASTOLIC BLOOD PRESSURE: 91 MMHG | RESPIRATION RATE: 18 BRPM | HEART RATE: 58 BPM | SYSTOLIC BLOOD PRESSURE: 163 MMHG | TEMPERATURE: 98.2 F | WEIGHT: 166.4 LBS | BODY MASS INDEX: 26.12 KG/M2

## 2024-06-21 DIAGNOSIS — R03.0 ELEVATED BP WITHOUT DIAGNOSIS OF HYPERTENSION: Primary | ICD-10-CM

## 2024-06-21 DIAGNOSIS — Z59.89 UNDER OR UNINSURED: ICD-10-CM

## 2024-06-21 PROCEDURE — 99213 OFFICE O/P EST LOW 20 MIN: CPT | Performed by: FAMILY MEDICINE

## 2024-06-21 SDOH — ECONOMIC STABILITY - INCOME SECURITY: OTHER PROBLEMS RELATED TO HOUSING AND ECONOMIC CIRCUMSTANCES: Z59.89

## 2024-06-21 NOTE — PROGRESS NOTES
Ambulatory Visit  Name: Galdino Gaines      : 1975      MRN: [id number]  Encounter Provider: Radha Platt MD  Encounter Date: 2024   Encounter department: [facility]    Assessment & Plan   1. Elevated BP without diagnosis of hypertension  Assessment & Plan:  BP elevated in office, asymptomatic. Reports potential pmhx of htn dx and states was on a BP medication in the past, however, does not recall which one or for how long her was on medication. Has hx of recent CVA. Per chart review patient normal tensive during hospitalization. Reports BP log from how with no SBP >130. Patient given BP log, reviewed how to take BP. Discussed diet and exercise modification. Will follow up in 1 week with BP log to evaluate need for BP medication. ED precautions reviewed.   2. Under or uninsured  Assessment & Plan:  Resides in [facility], was visiting family when his CVA occurred and will be spending time in the [place] to figure out his health issues  Referral to social work to assist with finances  Orders:  -     Ambulatory Referral to Social Work Care Management Program; Future       History of Present Illness     Patient presents to clinic to establish care. He has a hx of recent CVA, following with cardiology and neurology. He states he used to be on a BP medication but does not recall the name or for how long her has been on the medication. He does report that he was only on one BP medication.He reports that he has been taking his BP at home and that the systolic BP is never about 130 when he checks at home. He is unsure of what the DBP has been at. Patient states he does cook with salt, denies fast food or processed foods in diet. States he does not do anything for formal exercise. Patient denies hx of tobacco use. He reports good compliance with ASA and statin. Denies symptoms of hypertensive emergency.         Review of Systems   Constitutional:  Negative for  "chills and fever.   HENT:  Negative for congestion and rhinorrhea.    Eyes:  Negative for redness and visual disturbance.   Respiratory:  Negative for cough and shortness of breath.    Cardiovascular:  Negative for chest pain and palpitations.   Gastrointestinal:  Negative for abdominal pain and nausea.   Genitourinary:  Negative for difficulty urinating and dysuria.   Musculoskeletal:  Negative for arthralgias and myalgias.   Skin:  Negative for rash.   Allergic/Immunologic: Negative for environmental allergies and food allergies.   Neurological:  Negative for dizziness and headaches.       Objective     /91 (BP Location: Right arm, Patient Position: Sitting, Cuff Size: Standard)   Pulse 58   Temp 98.2 °F (36.8 °C) (Temporal)   Resp 18   Ht 5' 6.5\" (1.689 m)   Wt 75.5 kg (166 lb 6.4 oz)   SpO2 99%   BMI 26.46 kg/m²     Physical Exam  Constitutional:       General: He is not in acute distress.     Appearance: Normal appearance.   HENT:      Head: Normocephalic and atraumatic.   Cardiovascular:      Rate and Rhythm: Normal rate and regular rhythm.      Pulses: Normal pulses.      Heart sounds: Normal heart sounds.   Pulmonary:      Effort: Pulmonary effort is normal. No respiratory distress.      Breath sounds: Normal breath sounds.   Abdominal:      General: Bowel sounds are normal. There is no distension.      Palpations: Abdomen is soft.   Musculoskeletal:         General: Normal range of motion.      Cervical back: Normal range of motion.      Right lower leg: No edema.      Left lower leg: No edema.   Skin:     General: Skin is warm.   Neurological:      Mental Status: He is alert and oriented to person, place, and time.      Comments: Sensation intact and equal b/l of LE. 5/5 strength of B/L LE equal b/l       Administrative Statements     "

## 2024-06-21 NOTE — PROGRESS NOTES
Pt came into office as new pt. SW referred as pt does not have health insurance and is currently residing in [facility]. Pt was visiting family in [place] when accident occurred and will be staying here temporarily to get medical care. Pt is not citizen of [place] and does not have SS #.    Per pt's guarantor account, pt was provided FA application for [facility] through [facility].     [facility] to send message to [facility] to assist in connecting pt with SFS.

## 2024-06-21 NOTE — ASSESSMENT & PLAN NOTE
BP elevated in office, asymptomatic. Reports potential pmhx of htn dx and states was on a BP medication in the past, however, does not recall which one or for how long her was on medication. Has hx of recent CVA. Per chart review patient normal tensive during hospitalization. Reports BP log from how with no SBP >130. Patient given BP log, reviewed how to take BP. Discussed diet and exercise modification. Will follow up in 1 week with BP log to evaluate need for BP medication. ED precautions reviewed.

## 2024-06-21 NOTE — PATIENT INSTRUCTIONS
Keep Bp log daily and bring to follow up appointment.   Exercise 30 minutes a day 5 times a week.  Decrease sodium in diet.

## 2024-06-21 NOTE — ASSESSMENT & PLAN NOTE
Resides in [facility], was visiting family when his CVA occurred and will be spending time in the [place] to figure out his health issues  Referral to social work to assist with finances

## 2024-06-21 NOTE — ASSESSMENT & PLAN NOTE
Resides in [facility], was visiting family when his CVA occurred and will be spending time in the [place] to figure out his health issues  Referral to social work to assist with finances

## 2024-06-24 ENCOUNTER — OFFICE VISIT (OUTPATIENT)
Dept: SPEECH THERAPY | Facility: CLINIC | Age: 49
End: 2024-06-24
Payer: COMMERCIAL

## 2024-06-24 DIAGNOSIS — I63.81 CEREBROVASCULAR ACCIDENT (CVA) OF LEFT BASAL GANGLIA (HCC): ICD-10-CM

## 2024-06-24 DIAGNOSIS — I69.920 APHASIA, LATE EFFECT OF CEREBROVASCULAR DISEASE: Primary | ICD-10-CM

## 2024-06-24 PROCEDURE — 92507 TX SP LANG VOICE COMM INDIV: CPT

## 2024-06-24 NOTE — PROGRESS NOTES
"Daily Speech Treatment Note    Today's date: 2024   Patient’s name: Galdino Gaines  : 1975  MRN: [id number]  Safety measures: CVA  Referring provider: Kwadwo Crowley*    Encounter Diagnosis     ICD-10-CM    1. Aphasia, late effect of cerebrovascular disease  I69.920       2. Cerebrovascular accident (CVA) of left basal ganglia (HCC)  I63.81         Visit Tracking:  POC   Expires Auth Expiration Date ST Visit Limit   2024 PENDING PENDING          Visit/Unit Tracking:  Auth Status Date 24   MA PENDING Used 1 2    Remaining         Subjective/Behavioral:  -Patient was pleasant to work with today    -Patient was noted to present with echolalic speech today during therapy activities    Objective/Assessment:  -Reviewed testing results and goals in plan care with patient. Patient is in agreement at this time.    Short-term goals:  Patient will be educated on word finding strategies (i.e., circumlocution) for improved generative naming and verbal expression skills (to be achieved in 1-2 weeks).     SFA \"Jeepers Peepers\" cards utilized to target semantic feature analysis   Patient required moderate verbal cues throughout to provide verbal descriptors due to expressive aphasia  Patient also attempted to name words described by SLP - mod-max cues provided    Patient will complete concrete and abstract categorization tasks to 90% accuracy to facilitate improved generative naming skills (to be achieved in 8-10 weeks).     Patient will complete word generation tasks (e.g., verbal fluency, categorization, analogies, synonyms/antonyms, etc.) with 80% accuracy using word finding strategies to facilitate improved word retrieval skills (to be achieved in 8-10 weeks).     Patient will name up to 5 features to describe a person/place/thing with 80% accuracy to facilitate improved utilization of circumlocution strategy (to be achieved in 8-10 weeks).     Patient will complete picture " description tasks using language organization strategies with 80% accuracy to facilitate improved utilization of circumlocution strategy (to be achieved in 8-10 weeks).     Patient will answer complex verbal yes/no questions with 80% accuracy to facilitate increased auditory comprehension skills (to be achieved in 8-10 weeks).     Patient will follow multistep verbal directions with 80% accuracy to facilitate increased auditory comprehension skills (to be achieved in 8-10 weeks).     Patient will complete writing tasks at the sentence level with 80% accuracy to increase writing skills within FLE (to be achieved in 8-10 weeks).     iPad TactLyst Therapy (Writing): To target written expression/spelling, patient completed activity on iPad, spelling the picture shown. They were encouraged to read the word aloud/repeat the word to facilitate total communication approach.     4-5-6 letter words  Level: medium  Score: 5/20      Patient will read short paragraphs and answer multiple choice, yes/no, fill in the blank, and open-ended questions with 80% accuracy to facilitate increased comprehension of functional reading material (to be achieved in 8-10 weeks).     Plan:  -Patient was provided with home exercises/activities to target goals in plan of care at the end of today's session.  -Continue with current plan of care.

## 2024-06-25 NOTE — PROGRESS NOTES
Daily Speech Treatment Note    Today's date: 2024  Patient’s name: Galdino Gaines  : 1975  MRN: [id number]  Safety measures: h/o CVA  Referring provider: Evy Alvarez MD    Encounter Diagnosis     ICD-10-CM    1. Aphasia, late effect of cerebrovascular disease  I69.920       2. Cerebrovascular accident (CVA) of left basal ganglia (HCC)  I63.81         Visit Tracking:  POC   Expires Auth Expiration Date ST Visit Limit   2024 PENDING PENDING          Visit/Unit Tracking:  Auth Status Date 24   MA PENDING Used 1 2 3    Remaining        Subjective/Behavioral:  -Patient with no new concerns.    -It was assessed that patient would benefit from increasing his frequency of care to 3x/week. Spoke with patient about this recommendation. Reciprocal comprehension verbally expressed. Additional future appointments added.    Objective/Assessment:  -Reviewed patient's home exercises/activities completed since last appointment. Phrase to picture matching completed in  opp. Sentence to picture matching completed in  opp.    Short-term goals:  Patient will be educated on word finding strategies (i.e., circumlocution) for improved generative naming and verbal expression skills (to be achieved in 1-2 weeks).     Patient will complete concrete and abstract categorization tasks to 90% accuracy to facilitate improved generative naming skills (to be achieved in 8-10 weeks).     Patient will complete word generation tasks (e.g., verbal fluency, categorization, analogies, synonyms/antonyms, etc.) with 80% accuracy using word finding strategies to facilitate improved word retrieval skills (to be achieved in 8-10 weeks).   -See below.    Patient will name up to 5 features to describe a person/place/thing with 80% accuracy to facilitate improved utilization of circumlocution strategy (to be achieved in 8-10 weeks).     Patient will complete picture description tasks using language  organization strategies with 80% accuracy to facilitate improved utilization of circumlocution strategy (to be achieved in 8-10 weeks).     Patient will answer complex verbal yes/no questions with 80% accuracy to facilitate increased auditory comprehension skills (to be achieved in 8-10 weeks).     Patient will follow multistep verbal directions with 80% accuracy to facilitate increased auditory comprehension skills (to be achieved in 8-10 weeks).     Patient will complete writing tasks at the sentence level with 80% accuracy to increase writing skills within FLE (to be achieved in 8-10 weeks).   -Writing: Patient was instructed to write the name of each pictured object from his homework. Task completed in 20/30 opp (67% acc) independently, increasing to 28/30 opp (93% acc) with min verbal cues.     -Sentence completion: Patient was presented with a sentence and was instructed to complete it. Task completed in 14/20 opp (70% acc) independently, increasing to 20/20 opp (100% acc) with min semantic cues.     Patient will read short paragraphs and answer multiple choice, yes/no, fill in the blank, and open-ended questions with 80% accuracy to facilitate increased comprehension of functional reading material (to be achieved in 8-10 weeks).     Plan:  -Patient was provided with home exercises/activities to target goals in plan of care at the end of today's session.  -Continue with current plan of care.

## 2024-06-26 ENCOUNTER — OFFICE VISIT (OUTPATIENT)
Dept: SPEECH THERAPY | Facility: CLINIC | Age: 49
End: 2024-06-26
Payer: COMMERCIAL

## 2024-06-26 DIAGNOSIS — I63.81 CEREBROVASCULAR ACCIDENT (CVA) OF LEFT BASAL GANGLIA (HCC): ICD-10-CM

## 2024-06-26 DIAGNOSIS — I69.920 APHASIA, LATE EFFECT OF CEREBROVASCULAR DISEASE: Primary | ICD-10-CM

## 2024-06-26 PROCEDURE — 92507 TX SP LANG VOICE COMM INDIV: CPT | Performed by: SPEECH-LANGUAGE PATHOLOGIST

## 2024-06-26 NOTE — PROGRESS NOTES
Daily Speech Treatment Note    Today's date: 2024  Patient’s name: Galdino Gaines  : 1975  MRN: [id number]  Safety measures: h/o CVA  Referring provider: Evy Alvarez MD    Encounter Diagnosis     ICD-10-CM    1. Aphasia, late effect of cerebrovascular disease  I69.920       2. Cerebrovascular accident (CVA) of left basal ganglia (HCC)  I63.81         Visit Tracking:  POC   Expires Auth Expiration Date ST Visit Limit   2024 PENDING PENDING              Visit/Unit Tracking:  Auth Status Date 24   MA PENDING Used 1 2 3 4     Remaining            Subjective/Behavioral:  -Patient reported that he felt well today.    Objective/Assessment:  -Reviewed patient's home exercises/activities completed since last appointment. See below (word deduction & sentence completion).    Short-term goals:  Patient will be educated on word finding strategies (i.e., circumlocution) for improved generative naming and verbal expression skills (to be achieved in 1-2 weeks).     Patient will complete concrete and abstract categorization tasks to 90% accuracy to facilitate improved generative naming skills (to be achieved in 8-10 weeks).   -Generative naming: Patient instructed to name 3 words for a given category. Task completed in 21/30 opp (70% acc) independently, increasing to 27/30 opp (90% acc) with min-mod semantic cues, increasing to 30/30 opp (100% acc) with additional min phonemic cues. Patient benefited from additional processing time during exercise.    Patient will complete word generation tasks (e.g., verbal fluency, categorization, analogies, synonyms/antonyms, etc.) with 80% accuracy using word finding strategies to facilitate improved word retrieval skills (to be achieved in 8-10 weeks).   -Word deduction: Patient was provided with a clue and asked to name what was being described. Task completed in 38/60 opp (63% acc) independently, increasing to 60/60 opp (100% acc) with  min-mod semantic cues.     Patient will name up to 5 features to describe a person/place/thing with 80% accuracy to facilitate improved utilization of circumlocution strategy (to be achieved in 8-10 weeks).     Patient will complete picture description tasks using language organization strategies with 80% accuracy to facilitate improved utilization of circumlocution strategy (to be achieved in 8-10 weeks).     Patient will answer complex verbal yes/no questions with 80% accuracy to facilitate increased auditory comprehension skills (to be achieved in 8-10 weeks).     Patient will follow multistep verbal directions with 80% accuracy to facilitate increased auditory comprehension skills (to be achieved in 8-10 weeks).     Patient will complete writing tasks at the sentence level with 80% accuracy to increase writing skills within FLE (to be achieved in 8-10 weeks).   -Sentence completion: Patient was presented with the beginning of a sentence and asked to supply its ending. Task completed in 37/40 opp (93% acc) independently, increasing to 40/40 opp (100% acc) with min verbal cues.    Patient will read short paragraphs and answer multiple choice, yes/no, fill in the blank, and open-ended questions with 80% accuracy to facilitate increased comprehension of functional reading material (to be achieved in 8-10 weeks).     Plan:  -Patient was provided with home exercises/activities to target goals in plan of care at the end of today's session.  -Continue with current plan of care.

## 2024-06-27 NOTE — TELEPHONE ENCOUNTER
MSW phoned patient's sister, Meli, at [phone] to follow up. Meli stated that the did receive a letter about the appeal for extension to continue the MA coverage. Meli stated that there was an option for a phone/in person hearing. Meli stated that they opted for an in-person hearing and are waiting to hear back when same will be.    Meli stated that she did fill out the [facility] Financial Assistance application to help with hospital based costs (imaging, bloodwork, etc) and mailed it back last week. MSW did advise that it can take 2-3 weeks to be received by the [facility] Financial Counselors, so this writer will follow-up next week to see if they received same/have everything they need to process the referral.    Meli stated that patient has already had a visit at the [facility] last week and that she has been in contact with a Financial Counselor from the Clinic that can help connect patient to available financial assistance/grants that are offered at their clinic.     MSW will follow to obtain outcome from appeal for ongoing MA, and to ensure that the [facility] Financial Assistance application is received/processed.    imaging reviewed    Charting performed by bucky Pino for Dr. Neo Kaufman.      All medical record entries made by the scribe were at my direction. I have reviewed the chart  and agree that the record accurately reflects my personal performance of the history, physical  exam, hospital course, and assessment and plan.

## 2024-06-29 NOTE — PROGRESS NOTES
Daily Speech Treatment Note    Today's date: 2024  Patient’s name: Galdino Gainse  : 1975  MRN: [id number]  Safety measures: h/o CVA  Referring provider: Evy Alvarez MD    Encounter Diagnosis     ICD-10-CM    1. Aphasia, late effect of cerebrovascular disease  I69.920       2. Cerebrovascular accident (CVA) of left basal ganglia (HCC)  I63.81         Visit Tracking:  POC   Expires Auth Expiration Date ST Visit Limit   2024 PENDING PENDING              Visit/Unit Tracking:  Auth Status Date 24   MA PENDING Used 1 2 3 4 5 6 7     Remaining                    Subjective/Behavioral:  -Patient presented to appointment late secondary to looking at the wrong date on his calendar per patient report. Patient was able to be seen by provider, however, due to an opening in her schedule.    Objective/Assessment:  -Reviewed patient's home exercises/activities completed since last appointment. Fill-in missing words in paragraph completed with 87% acc.    Short-term goals:  Patient will be educated on word finding strategies (i.e., circumlocution) for improved generative naming and verbal expression skills (to be achieved in 1-2 weeks).     Patient will complete concrete and abstract categorization tasks to 90% accuracy to facilitate improved generative naming skills (to be achieved in 8-10 weeks).   -[place] categorization: Patient was provided with 3 words belonging to the same category. Patient named category label in 31/50 opp (62% acc) independently, increasing to 40/50 opp (80% acc) with min-mod semantic cues, increasing to 48/50 opp (96% acc) with additional min phonemic cues.     Patient will complete word generation tasks (e.g., verbal fluency, categorization, analogies, synonyms/antonyms, etc.) with 80% accuracy using word finding strategies to facilitate improved word retrieval skills (to be achieved in 8-10 weeks).     Patient will  name up to 5 features to describe a person/place/thing with 80% accuracy to facilitate improved utilization of circumlocution strategy (to be achieved in 8-10 weeks).     Patient will complete picture description tasks using language organization strategies with 80% accuracy to facilitate improved utilization of circumlocution strategy (to be achieved in 8-10 weeks).     Patient will answer complex verbal yes/no questions with 80% accuracy to facilitate increased auditory comprehension skills (to be achieved in 8-10 weeks).     Patient will follow multistep verbal directions with 80% accuracy to facilitate increased auditory comprehension skills (to be achieved in 8-10 weeks).     Patient will complete writing tasks at the sentence level with 80% accuracy to increase writing skills within FLE (to be achieved in 8-10 weeks).     Patient will read short paragraphs and answer multiple choice, yes/no, fill in the blank, and open-ended questions with 80% accuracy to facilitate increased comprehension of functional reading material (to be achieved in 8-10 weeks).     Plan:  -Patient was provided with home exercises/activities to target goals in plan of care at the end of today's session.  -Continue with current plan of care.

## 2024-06-30 NOTE — PROGRESS NOTES
Daily Speech Treatment Note    Today's date: 7/10/2024  Patient’s name: Galdino Gaines  : 1975  MRN: [id number]  Safety measures: h/o CVA  Referring provider: Evy Alvarez MD    Encounter Diagnosis     ICD-10-CM    1. Aphasia, late effect of cerebrovascular disease  I69.920       2. Cerebrovascular accident (CVA) of left basal ganglia (HCC)  I63.81         Visit Tracking:  POC   Expires Auth Expiration Date ST Visit Limit   2024 PENDING PENDING              Visit/Unit Tracking:  Auth Status Date 06/20/24 06/24/24 06/26/24 07/01/24 07/03/24 07/05/24 07/08/24 07/10/24   MA PENDING Used 1 2 3 4 5 6 7 8     Remaining                  Subjective/Behavioral:  -Patient was motivated to begin therapy.    Objective/Assessment:  -Reviewed patient's home exercises/activities completed since last appointment. Generative naming (5 items to a category) completed in  opp. Category label naming (3 words) completed in  opp. Additional category member naming (a fourth word) completed in  opp. Patient achieved 100% acc during therapy today provided min-mod semantic cues from clinician. Patient with echolalic speech during completion.     Short-term goals:  Patient will be educated on word finding strategies (i.e., circumlocution) for improved generative naming and verbal expression skills (to be achieved in 1-2 weeks).     Patient will complete concrete and abstract categorization tasks to 90% accuracy to facilitate improved generative naming skills (to be achieved in 8-10 weeks).     Patient will complete word generation tasks (e.g., verbal fluency, categorization, analogies, synonyms/antonyms, etc.) with 80% accuracy using word finding strategies to facilitate improved word retrieval skills (to be achieved in 8-10 weeks).     Patient will name up to 5 features to describe a person/place/thing with 80% accuracy to facilitate improved utilization of circumlocution strategy (to be achieved in 8-10  weeks).     Patient will complete picture description tasks using language organization strategies with 80% accuracy to facilitate improved utilization of circumlocution strategy (to be achieved in 8-10 weeks).     Patient will answer complex verbal yes/no questions with 80% accuracy to facilitate increased auditory comprehension skills (to be achieved in 8-10 weeks).     Patient will follow multistep verbal directions with 80% accuracy to facilitate increased auditory comprehension skills (to be achieved in 8-10 weeks).     Patient will complete writing tasks at the sentence level with 80% accuracy to increase writing skills within FLE (to be achieved in 8-10 weeks).     Patient will read short paragraphs and answer multiple choice, yes/no, fill in the blank, and open-ended questions with 80% accuracy to facilitate increased comprehension of functional reading material (to be achieved in 8-10 weeks).   -Reading comprehension: Patient was presented with single paragraphs ([facility] 1, pg. 194-195) and asked to answer follow-up yes/no questions pertaining to the stories. Clinician had patient read each paragraph aloud to assist with comprehension. Task completed in 17/20 opp (85% acc) independently. Clinician reviewed the incorrect answers with patient at end of trials. Clinician encouraged patient to continue with reading materials aloud to practice speech-language skills during his recovery.    -Word finding/graphic expression/reading comprehension: Patient was presented with a short paragraph containing fill-in-the-blank spaces and was asked to solve for missing words. Task completed in 5/11 opp (45% acc) independently, increasing to 9/11 opp (82% acc) with min semantic cues, increasing to 11/11 opp (100% acc) with additional mod semantic cues. Min assistance with spelling.    Plan:  -Patient was provided with home exercises/activities to target goals in plan of care at the end of today's session.  -Continue with  current plan of care.

## 2024-07-01 ENCOUNTER — OFFICE VISIT (OUTPATIENT)
Dept: FAMILY MEDICINE CLINIC | Facility: CLINIC | Age: 49
End: 2024-07-01

## 2024-07-01 ENCOUNTER — OFFICE VISIT (OUTPATIENT)
Dept: SPEECH THERAPY | Facility: CLINIC | Age: 49
End: 2024-07-01
Payer: COMMERCIAL

## 2024-07-01 VITALS
RESPIRATION RATE: 20 BRPM | TEMPERATURE: 98 F | HEIGHT: 67 IN | SYSTOLIC BLOOD PRESSURE: 163 MMHG | DIASTOLIC BLOOD PRESSURE: 85 MMHG | WEIGHT: 165 LBS | HEART RATE: 63 BPM | BODY MASS INDEX: 25.9 KG/M2 | OXYGEN SATURATION: 98 %

## 2024-07-01 DIAGNOSIS — I63.81 CEREBROVASCULAR ACCIDENT (CVA) OF LEFT BASAL GANGLIA (HCC): ICD-10-CM

## 2024-07-01 DIAGNOSIS — R03.0 ELEVATED BP WITHOUT DIAGNOSIS OF HYPERTENSION: Primary | ICD-10-CM

## 2024-07-01 DIAGNOSIS — I69.920 APHASIA, LATE EFFECT OF CEREBROVASCULAR DISEASE: Primary | ICD-10-CM

## 2024-07-01 PROCEDURE — 99213 OFFICE O/P EST LOW 20 MIN: CPT | Performed by: FAMILY MEDICINE

## 2024-07-01 PROCEDURE — 92507 TX SP LANG VOICE COMM INDIV: CPT | Performed by: SPEECH-LANGUAGE PATHOLOGIST

## 2024-07-01 NOTE — LETTER
July 1, 2024     Patient: Galdino Gaines  YOB: 1975  Date of Visit: 7/1/2024    To Whom it may concern,    Patient has been seen and is under my medical care. Last seen in office on 7/1/24. Patient had medical event requiring hospitalization on May 11th and requires on going follow up appointments with multiple specialist including Cardiology and Neurology as well as with myself, PCP, for on going management of condition and ongoing work up of condition. Please extend patient's visa. If you have any questions or concerns please call our office.       Sincerely,        Dr. Radha Platt MD

## 2024-07-01 NOTE — PATIENT INSTRUCTIONS
If your home blood pressure readings are above 140/ 80 please call the office to let Dr. Platt know.     Complete fasting lipid panel ( lab work) around September of 2024.

## 2024-07-01 NOTE — PROGRESS NOTES
Ambulatory Visit  Name: Galdino Gaines      : 1975      MRN: [id number]  Encounter Provider: Radha Platt MD  Encounter Date: 2024   Encounter department: [facility]    Assessment & Plan   1. Elevated BP without diagnosis of hypertension  Assessment & Plan:  BP elevated in office on two consecutive visits, asymptomatic. Reports potential pmhx of htn dx and states was on a BP medication in the past, however, does not recall which one or for how long her was on medication. Has hx of recent CVA. Per chart review patient normal tensive during hospitalization. Reviewed patients bp log,  BP mildly above BP goal of <140/80. Discussed diet and exercise modification. Suspect given well controled BP's at home, white coat HTN. Will follow up in 3 months with BP log to continue to evaluate need for BP medication. ED precautions reviewed. Patient instructed to call office sooner if home BP log >140/8- more than 2 times.        History of Present Illness     HPI    Galdino Gaines is a 47yo M with a PMH of CVA L basal ganglia (- hospitalization) here for management of elevated BP readings in the office. Patient brought a blood pressure log with him which shows only 2 mildly elevated readings. Patient denies any fatigue, chest pain, palpitations, headaches, dizziness, vision changes. Patient notes that he has been working with a speech therapist and his aphasia has been improving. Patient has no complaints at this time.     Review of Systems   Constitutional:  Negative for chills and fever.   HENT:  Negative for ear pain and sore throat.    Eyes:  Negative for pain and visual disturbance.   Respiratory:  Negative for cough and shortness of breath.    Cardiovascular:  Negative for chest pain and palpitations.   Gastrointestinal:  Negative for abdominal pain and vomiting.   Genitourinary:  Negative for dysuria and hematuria.   Musculoskeletal:  Negative  "for arthralgias and back pain.   Skin:  Negative for color change and rash.   Neurological:  Negative for seizures and syncope.   All other systems reviewed and are negative.      Objective     /85   Pulse 63   Temp 98 °F (36.7 °C) (Temporal)   Resp 20   Ht 5' 6.5\" (1.689 m)   Wt 74.8 kg (165 lb)   SpO2 98%   BMI 26.23 kg/m²     Physical Exam  Vitals and nursing note reviewed.   Constitutional:       General: He is not in acute distress.     Appearance: Normal appearance. He is well-developed.   HENT:      Head: Normocephalic and atraumatic.   Cardiovascular:      Rate and Rhythm: Normal rate and regular rhythm.      Heart sounds: Normal heart sounds. No murmur heard.  Pulmonary:      Effort: Pulmonary effort is normal. No respiratory distress.      Breath sounds: Normal breath sounds.   Abdominal:      General: Bowel sounds are normal.      Palpations: Abdomen is soft.      Tenderness: There is no abdominal tenderness.   Musculoskeletal:         General: Normal range of motion.      Cervical back: Neck supple.      Right lower leg: No edema.      Left lower leg: No edema.   Skin:     General: Skin is warm and dry.      Capillary Refill: Capillary refill takes less than 2 seconds.   Neurological:      Mental Status: He is alert and oriented to person, place, and time. Mental status is at baseline.   Psychiatric:         Mood and Affect: Mood normal.       Administrative Statements     "

## 2024-07-02 ENCOUNTER — PATIENT OUTREACH (OUTPATIENT)
Dept: FAMILY MEDICINE CLINIC | Facility: CLINIC | Age: 49
End: 2024-07-02

## 2024-07-02 NOTE — PROGRESS NOTES
IB message received from Dr. Giulia MD requesting assistance for pt to get established with medical coverage. Pt was visiting family in [place] from another country when he experienced hospitalization and illness. He stated he received assistance while hospitalized to cover those costs, but does not have insurance at this time. Pt interested in setting up insurance as he will need further follow up w/ PCP and other specialists.    Chart review completed. Per chart review, Neurology SW MELBA Thornton contacted pt and pt's sister to assist w/ insurance coverage needs. Per chart review, pt applied for MA through [employer] while in the hospital. Per chart review, pt was approved for Emergency MA while in the hospital. Per chart review, pt does not qualify for ongoing MA due to not meeting citizenship requirements. Per chart, pt was advised to contact [facility] Financial Assistance to apply for [employer]. Per chart review, pt appealed the decision with assistance from his sister, Meli who he is residing with. Per chart review, pt was advised to connect w/ [facility] for ongoing medical care w/ their financial assistance program. Per guarantor account, pt was contacted by  to assist w/ applications and pt's sister Meli stated she had received and mailed back the application.    OP SWCM sent [employer] Secure Chat to Neurology SW to see if there was anything else the pt or family was needing. Neurology SW did confirm the above information w/ SWCM. She advised that establishing pt with SFS is needed.     OP SWCM placed call to pt's sister Meli w/ success. OP SWCM introduced self, role and reason for calling. Meli explained she had mailed out all of the applications completed. She stated she did receive a letter back that there were some missing documents needed to complete the [employer] application. She is going to call the [facility] Financial Counselors number to see what documents were missing and what she can provide.    OP SWCM  explained that [facility] SFS is a different application than [facility] and further explained the program. Meli expressed understanding. OP LISACM stated that LISA will speak w/ the FC tomorrow in the office to see if application was mailed in and completed and also assist further.     LISA provided contact information to Meli and advised her to reach out to LISA if she has any other questions.

## 2024-07-02 NOTE — ASSESSMENT & PLAN NOTE
BP elevated in office on two consecutive visits, asymptomatic. Reports potential pmhx of htn dx and states was on a BP medication in the past, however, does not recall which one or for how long her was on medication. Has hx of recent CVA. Per chart review patient normal tensive during hospitalization. Reviewed patients bp log, 2/14 BP mildly above BP goal of <140/80. Discussed diet and exercise modification. Suspect given well controled BP's at home, white coat HTN. Will follow up in 3 months with BP log to continue to evaluate need for BP medication. ED precautions reviewed. Patient instructed to call office sooner if home BP log >140/8- more than 2 times.

## 2024-07-03 ENCOUNTER — OFFICE VISIT (OUTPATIENT)
Dept: SPEECH THERAPY | Facility: CLINIC | Age: 49
End: 2024-07-03
Payer: COMMERCIAL

## 2024-07-03 DIAGNOSIS — I63.81 CEREBROVASCULAR ACCIDENT (CVA) OF LEFT BASAL GANGLIA (HCC): ICD-10-CM

## 2024-07-03 DIAGNOSIS — I69.920 APHASIA, LATE EFFECT OF CEREBROVASCULAR DISEASE: Primary | ICD-10-CM

## 2024-07-03 PROCEDURE — 92507 TX SP LANG VOICE COMM INDIV: CPT

## 2024-07-03 NOTE — TELEPHONE ENCOUNTER
MSW phoned the [facility] Financial Counselors' Office this date at [phone] and spoke with Jose Manuel. Jose Manuel stated that they only initially received the application for this patient, no financial documents so that case was closed. Jose Manuel stated that some documents then came in so the case was re-opened. Jose Manuel stated that a pending letter was mailed on 7/2, but the still need the following information from patient:    - letter stating that patient does not have a tax return and no [place] bank accounts.   - zero income worksheet (this was mailed to patient with the pending letter)     Jose Manuel stated that the [facility] Financial Counselor handling the application is Fito.     MSW phoned patient's sister, Meli, at [phone] to make her aware above. No answer. MSW left a detailed message with above information. MSW requested a callback to provide update on [facility] Financial Assistance application and to see if they received an update on the appeal request for additional Medicaid coverage. Awaiting same.

## 2024-07-03 NOTE — PROGRESS NOTES
Daily Speech Treatment Note    Today's date: 2024  Patient’s name: Galdino Gaines  : 1975  MRN: [id number]  Safety measures: h/o CVA  Referring provider: Kwadwo Crowley*    Encounter Diagnosis     ICD-10-CM    1. Aphasia, late effect of cerebrovascular disease  I69.920       2. Cerebrovascular accident (CVA) of left basal ganglia (HCC)  I63.81           Visit Tracking:  POC   Expires Auth Expiration Date ST Visit Limit   2024 PENDING PENDING              Visit/Unit Tracking:  Auth Status Date 24   MA PENDING Used 1 2 3 4 5     Remaining             Subjective/Behavioral:  -Patient reports that things are improving for him.    Objective/Assessment:  -Reviewed patient's home exercises/activities completed since last appointment. Patient completed Category homework with 48/50 trials correct.    Short-term goals:  Patient will be educated on word finding strategies (i.e., circumlocution) for improved generative naming and verbal expression skills (to be achieved in 1-2 weeks).     Patient will complete concrete and abstract categorization tasks to 90% accuracy to facilitate improved generative naming skills (to be achieved in 8-10 weeks).     Patient will complete word generation tasks (e.g., verbal fluency, categorization, analogies, synonyms/antonyms, etc.) with 80% accuracy using word finding strategies to facilitate improved word retrieval skills (to be achieved in 8-10 weeks).   To target higher level generative naming, patient completed Quicktionary activity/game using multiple word and letter restrictions (i.e., word associated with sports, contains the letter T, is exactly 2 syllables). Patient completed using 1 card (yellow)/restrictions (2 cards was too difficult) over 15 trials with 100% accuracy, provided min cueing.      Patient will name up to 5 features to describe a person/place/thing with 80% accuracy to facilitate improved  utilization of circumlocution strategy (to be achieved in 8-10 weeks).     Patient will complete picture description tasks using language organization strategies with 80% accuracy to facilitate improved utilization of circumlocution strategy (to be achieved in 8-10 weeks).     Patient will answer complex verbal yes/no questions with 80% accuracy to facilitate increased auditory comprehension skills (to be achieved in 8-10 weeks).       Patient will follow multistep verbal directions with 80% accuracy to facilitate increased auditory comprehension skills (to be achieved in 8-10 weeks).     Patient will complete writing tasks at the sentence level with 80% accuracy to increase writing skills within FLE (to be achieved in 8-10 weeks).   Continuing with the Quictionary activity (see above goal), as patient named a word, he then was tasked to create a sentence using the word named. Task completed with 93% accuracy, provided min cueing.     Patient will read short paragraphs and answer multiple choice, yes/no, fill in the blank, and open-ended questions with 80% accuracy to facilitate increased comprehension of functional reading material (to be achieved in 8-10 weeks).     Plan:  -Patient was provided with home exercises/activities to target goals in plan of care at the end of today's session.  -Continue with current plan of care.

## 2024-07-03 NOTE — PROGRESS NOTES
Daily Speech Treatment Note    Today's date: 7/3/2024  Patient’s name: Galdino Gaines  : 1975  MRN: [id number]  Safety measures: h/o CVA  Referring provider: Kwadwo Crowley*    Encounter Diagnosis     ICD-10-CM    1. Aphasia, late effect of cerebrovascular disease  I69.920       2. Cerebrovascular accident (CVA) of left basal ganglia (HCC)  I63.81           Visit Tracking:  POC   Expires Auth Expiration Date ST Visit Limit   2024 PENDING PENDING              Visit/Unit Tracking:  Auth Status Date 24   MA PENDING Used 1 2 3 4     Remaining            Subjective/Behavioral:  -Patient reported that someone called him yesterday to say that his sister should come with him today.    Objective/Assessment:  -Reviewed patient's home exercises/activities completed since last appointment. See below (categories).    Short-term goals:  Patient will be educated on word finding strategies (i.e., circumlocution) for improved generative naming and verbal expression skills (to be achieved in 1-2 weeks).     Patient will complete concrete and abstract categorization tasks to 90% accuracy to facilitate improved generative naming skills (to be achieved in 8-10 weeks).   -Generative naming: Patient instructed to name 3 words for a given category. Task completed in 24/32 opp (87% acc) independently for homework, increasing to 30/32 opp (94% acc) with min-mod semantic cues during the session, increasing to 32/32 opp (100% acc) with additional min phonemic cues. Patient benefited from additional processing time during exercise.    Patient will complete word generation tasks (e.g., verbal fluency, categorization, analogies, synonyms/antonyms, etc.) with 80% accuracy using word finding strategies to facilitate improved word retrieval skills (to be achieved in 8-10 weeks).     Patient will name up to 5 features to describe a person/place/thing with 80% accuracy to facilitate improved  utilization of circumlocution strategy (to be achieved in 8-10 weeks).     Patient will complete picture description tasks using language organization strategies with 80% accuracy to facilitate improved utilization of circumlocution strategy (to be achieved in 8-10 weeks).     Patient will answer complex verbal yes/no questions with 80% accuracy to facilitate increased auditory comprehension skills (to be achieved in 8-10 weeks).     Pt answered comparison yes/no questions in 16/20 (80%) opportunities. He benefited from visual cues to improve to 19/20 (95%) opportunities.    Patient will follow multistep verbal directions with 80% accuracy to facilitate increased auditory comprehension skills (to be achieved in 8-10 weeks).     Patient will complete writing tasks at the sentence level with 80% accuracy to increase writing skills within FLE (to be achieved in 8-10 weeks).     Patient will read short paragraphs and answer multiple choice, yes/no, fill in the blank, and open-ended questions with 80% accuracy to facilitate increased comprehension of functional reading material (to be achieved in 8-10 weeks).     Plan:  -Patient was provided with home exercises/activities to target goals in plan of care at the end of today's session.  -Continue with current plan of care.

## 2024-07-05 ENCOUNTER — OFFICE VISIT (OUTPATIENT)
Dept: SPEECH THERAPY | Facility: CLINIC | Age: 49
End: 2024-07-05
Payer: COMMERCIAL

## 2024-07-05 DIAGNOSIS — I63.81 CEREBROVASCULAR ACCIDENT (CVA) OF LEFT BASAL GANGLIA (HCC): ICD-10-CM

## 2024-07-05 DIAGNOSIS — I69.920 APHASIA, LATE EFFECT OF CEREBROVASCULAR DISEASE: Primary | ICD-10-CM

## 2024-07-05 PROCEDURE — 92507 TX SP LANG VOICE COMM INDIV: CPT | Performed by: SPEECH-LANGUAGE PATHOLOGIST

## 2024-07-05 NOTE — TELEPHONE ENCOUNTER
MSW had yet to hear back from patient's sister, Meli, so MSW placed call to her this date at [phone]. Meli stated that she did get this writer's message and she will wait to receive the zero income worksheet via mail and will write the requested letter stating that patient does not have a tax return and no [place] bank accounts.     While on the phone, MSW inquired if they had heard anything about the hearing for additional MA coverage. Meli stated that they had not.     MSW will follow-up with Meli in about 1-2 weeks.

## 2024-07-08 ENCOUNTER — OFFICE VISIT (OUTPATIENT)
Dept: SPEECH THERAPY | Facility: CLINIC | Age: 49
End: 2024-07-08
Payer: COMMERCIAL

## 2024-07-08 DIAGNOSIS — I69.920 APHASIA, LATE EFFECT OF CEREBROVASCULAR DISEASE: Primary | ICD-10-CM

## 2024-07-08 DIAGNOSIS — I63.81 CEREBROVASCULAR ACCIDENT (CVA) OF LEFT BASAL GANGLIA (HCC): ICD-10-CM

## 2024-07-08 PROCEDURE — 92507 TX SP LANG VOICE COMM INDIV: CPT | Performed by: SPEECH-LANGUAGE PATHOLOGIST

## 2024-07-08 NOTE — PROGRESS NOTES
Daily Speech Treatment Note    Today's date: 2024 ***  Patient’s name: Galdino Gaines  : 1975  MRN: [id number]  Safety measures: h/o CVA  Referring provider: Evy Alvarez MD    Encounter Diagnosis     ICD-10-CM    1. Aphasia, late effect of cerebrovascular disease  I69.920       2. Cerebrovascular accident (CVA) of left basal ganglia (HCC)  I63.81         Visit Tracking:  ***    Subjective/Behavioral:  -***    Objective/Assessment:  -Reviewed patient's home exercises/activities completed since last appointment. ***    Short-term goals:  Patient will be educated on word finding strategies (i.e., circumlocution) for improved generative naming and verbal expression skills (to be achieved in 1-2 weeks).     Patient will complete concrete and abstract categorization tasks to 90% accuracy to facilitate improved generative naming skills (to be achieved in 8-10 weeks).     Patient will complete word generation tasks (e.g., verbal fluency, categorization, analogies, synonyms/antonyms, etc.) with 80% accuracy using word finding strategies to facilitate improved word retrieval skills (to be achieved in 8-10 weeks).     Patient will name up to 5 features to describe a person/place/thing with 80% accuracy to facilitate improved utilization of circumlocution strategy (to be achieved in 8-10 weeks).     Patient will complete picture description tasks using language organization strategies with 80% accuracy to facilitate improved utilization of circumlocution strategy (to be achieved in 8-10 weeks).     Patient will answer complex verbal yes/no questions with 80% accuracy to facilitate increased auditory comprehension skills (to be achieved in 8-10 weeks).     Patient will follow multistep verbal directions with 80% accuracy to facilitate increased auditory comprehension skills (to be achieved in 8-10 weeks).     Patient will complete writing tasks at the sentence level with 80% accuracy to increase writing skills  within FLE (to be achieved in 8-10 weeks).     Patient will read short paragraphs and answer multiple choice, yes/no, fill in the blank, and open-ended questions with 80% accuracy to facilitate increased comprehension of functional reading material (to be achieved in 8-10 weeks).     Plan:  -Patient was provided with home exercises/activities to target goals in plan of care at the end of today's session.  -Continue with current plan of care.

## 2024-07-08 NOTE — PROGRESS NOTES
Daily Speech Treatment Note    Today's date: 2024  Patient’s name: Galdino Gaines  : 1975  MRN: [id number]  Safety measures: h/o CVA  Referring provider: Evy Alvarez MD    Encounter Diagnosis     ICD-10-CM    1. Aphasia, late effect of cerebrovascular disease  I69.920       2. Cerebrovascular accident (CVA) of left basal ganglia (HCC)  I63.81         Visit Tracking:  POC   Expires Auth Expiration Date ST Visit Limit   2024 PENDING PENDING              Visit/Unit Tracking:  Auth Status Date 24            MA PENDING Used 11              Remaining BOMN              Subjective/Behavioral:  -Patient with no complaints today.    Objective/Assessment:  -Reviewed patient's home exercises/activities completed since last appointment. Identifying antonyms from a field of 3 choices completed in  opp.    Short-term goals:  Patient will be educated on word finding strategies (i.e., circumlocution) for improved generative naming and verbal expression skills (to be achieved in 1-2 weeks).     Patient will complete concrete and abstract categorization tasks to 90% accuracy to facilitate improved generative naming skills (to be achieved in 8-10 weeks).     Patient will complete word generation tasks (e.g., verbal fluency, categorization, analogies, synonyms/antonyms, etc.) with 80% accuracy using word finding strategies to facilitate improved word retrieval skills (to be achieved in 8-10 weeks).   -Word finding: Patient was presented with a list of words and asked to generate synonyms. Task completed in  opp (67% acc) independently, increasing to  opp (100% acc) with min-mod semantic cues. Clinician educated patient on putting words in sentences to aid with word retrieval.    Patient will name up to 5 features to describe a person/place/thing with 80% accuracy to facilitate improved utilization of circumlocution strategy (to be achieved in 8-10 weeks).     Patient will complete picture  description tasks using language organization strategies with 80% accuracy to facilitate improved utilization of circumlocution strategy (to be achieved in 8-10 weeks).     Patient will answer complex verbal yes/no questions with 80% accuracy to facilitate increased auditory comprehension skills (to be achieved in 8-10 weeks).     Patient will follow multistep verbal directions with 80% accuracy to facilitate increased auditory comprehension skills (to be achieved in 8-10 weeks).     Patient will complete writing tasks at the sentence level with 80% accuracy to increase writing skills within FLE (to be achieved in 8-10 weeks).   -See above (synonym naming task). Spelling of answers correctly was completed in 20/24 opp (83% acc) independently.    Patient will read short paragraphs and answer multiple choice, yes/no, fill in the blank, and open-ended questions with 80% accuracy to facilitate increased comprehension of functional reading material (to be achieved in 8-10 weeks).   -Reading comprehension: Patient was presented with single paragraphs and asked to answer open-ended questions pertaining to the stories. Task completed in 5/8 opp (63% acc) independently, increasing to 8/8 opp (100% acc) with min verbal cues. Clinician encouraged patient to read stories aloud to facilitate increased comprehension.    Plan:  -Patient was provided with home exercises/activities to target goals in plan of care at the end of today's session.  -Continue with current plan of care.

## 2024-07-10 ENCOUNTER — OFFICE VISIT (OUTPATIENT)
Dept: SPEECH THERAPY | Facility: CLINIC | Age: 49
End: 2024-07-10
Payer: COMMERCIAL

## 2024-07-10 DIAGNOSIS — I63.81 CEREBROVASCULAR ACCIDENT (CVA) OF LEFT BASAL GANGLIA (HCC): ICD-10-CM

## 2024-07-10 DIAGNOSIS — I69.920 APHASIA, LATE EFFECT OF CEREBROVASCULAR DISEASE: Primary | ICD-10-CM

## 2024-07-10 PROCEDURE — 92507 TX SP LANG VOICE COMM INDIV: CPT | Performed by: SPEECH-LANGUAGE PATHOLOGIST

## 2024-07-12 ENCOUNTER — OFFICE VISIT (OUTPATIENT)
Dept: SPEECH THERAPY | Facility: CLINIC | Age: 49
End: 2024-07-12
Payer: COMMERCIAL

## 2024-07-12 DIAGNOSIS — I63.81 CEREBROVASCULAR ACCIDENT (CVA) OF LEFT BASAL GANGLIA (HCC): ICD-10-CM

## 2024-07-12 DIAGNOSIS — I69.920 APHASIA, LATE EFFECT OF CEREBROVASCULAR DISEASE: Primary | ICD-10-CM

## 2024-07-12 PROCEDURE — 92507 TX SP LANG VOICE COMM INDIV: CPT

## 2024-07-12 NOTE — PROGRESS NOTES
Daily Speech Treatment Note    Today's date: 2024  Patient’s name: Galdino Gaines  : 1975  MRN: [id number]  Safety measures: h/o CVA  Referring provider: Evy Alvarez MD    Encounter Diagnosis     ICD-10-CM    1. Aphasia, late effect of cerebrovascular disease  I69.920       2. Cerebrovascular accident (CVA) of left basal ganglia (HCC)  I63.81         Visit Tracking:  POC   Expires Auth Expiration Date ST Visit Limit   2024 PENDING PENDING              Visit/Unit Tracking:  Auth Status Date 06/20/24 06/24/24 06/26/24 07/01/24 07/03/24 07/05/24 07/08/24 07/10/24 7/12/24   MA PENDING Used 1 2 3 4 5 6 7 8 9     Remaining                   Subjective/Behavioral:  -Patient was motivated to begin therapy.    Objective/Assessment:  Reviewed HEP (filling in words - paragraph) - patient completed to 80% acc; mod cues to complete to 100%    Short-term goals:  Patient will be educated on word finding strategies (i.e., circumlocution) for improved generative naming and verbal expression skills (to be achieved in 1-2 weeks).     Patient will complete concrete and abstract categorization tasks to 90% accuracy to facilitate improved generative naming skills (to be achieved in 8-10 weeks).     Patient will complete word generation tasks (e.g., verbal fluency, categorization, analogies, synonyms/antonyms, etc.) with 80% accuracy using word finding strategies to facilitate improved word retrieval skills (to be achieved in 8-10 weeks).     Naming word described (e.g., jewelry, wrist, gold) ; task completed over 10 trials with mod-max cues throughout    Patient will name up to 5 features to describe a person/place/thing with 80% accuracy to facilitate improved utilization of circumlocution strategy (to be achieved in 8-10 weeks).     Patient will complete picture description tasks using language organization strategies with 80% accuracy to facilitate improved utilization of circumlocution strategy (to be  achieved in 8-10 weeks).     Patient will answer complex verbal yes/no questions with 80% accuracy to facilitate increased auditory comprehension skills (to be achieved in 8-10 weeks).     Patient will follow multistep verbal directions with 80% accuracy to facilitate increased auditory comprehension skills (to be achieved in 8-10 weeks).     Patient will complete writing tasks at the sentence level with 80% accuracy to increase writing skills within FLE (to be achieved in 8-10 weeks).     Opposite naming (saying and writing answers): patient independently named the correct opposite in 25/35 opp. Spelling errors x 5    Patient will read short paragraphs and answer multiple choice, yes/no, fill in the blank, and open-ended questions with 80% accuracy to facilitate increased comprehension of functional reading material (to be achieved in 8-10 weeks).       Plan:  -Patient was provided with home exercises/activities to target goals in plan of care at the end of today's session.  -Continue with current plan of care.

## 2024-07-15 ENCOUNTER — APPOINTMENT (OUTPATIENT)
Dept: SPEECH THERAPY | Facility: CLINIC | Age: 49
End: 2024-07-15
Payer: COMMERCIAL

## 2024-07-15 DIAGNOSIS — I69.920 APHASIA, LATE EFFECT OF CEREBROVASCULAR DISEASE: Primary | ICD-10-CM

## 2024-07-15 DIAGNOSIS — I63.81 CEREBROVASCULAR ACCIDENT (CVA) OF LEFT BASAL GANGLIA (HCC): ICD-10-CM

## 2024-07-15 NOTE — PROGRESS NOTES
Daily Speech Treatment Note    Today's date: 2024  Patient’s name: Galdino Gaines  : 1975  MRN: [id number]  Safety measures: h/o CVA  Referring provider: vEy Alvarez MD    Encounter Diagnosis     ICD-10-CM    1. Aphasia, late effect of cerebrovascular disease  I69.920       2. Cerebrovascular accident (CVA) of left basal ganglia (HCC)  I63.81         Visit Tracking:  POC   Expires Auth Expiration Date ST Visit Limit   2024 PENDING PENDING              Visit/Unit Tracking:  Auth Status Date 24               MA PENDING Used 11 12 13 14                 Remaining BOMN BOMN BOMN BOMN                 Subjective/Behavioral:  -Patient with no new concerns today.    Objective/Assessment:  -Reviewed patient's home exercises/activities completed since last appointment. See below.    Short-term goals:  Patient will be educated on word finding strategies (i.e., circumlocution) for improved generative naming and verbal expression skills (to be achieved in 1-2 weeks).     Patient will complete concrete and abstract categorization tasks to 90% accuracy to facilitate improved generative naming skills (to be achieved in 8-10 weeks).   -Word finding/sequencing: Patient was presented with a category label and asked to fill-in-the-blanks with letters to form 10 words belonging to the category. Task completed in 30/40 opp (75% acc) independently, increasing to 38/40 opp (95% acc) with min semantic cues.    -Word finding: The game “[employer]” was utilized to target speed of naming/processing based upon category labels (e.g., flower, pop band, candy bar). Level 1 cards were utilized. Patient named an average of 5.33 cards/min (norm 10+). Clinician educated patient on strategies to assist with thought organization and word retrieval speed, as well as circumlocution. Reviewed 9 skipped cards at end of trials.    Patient will complete word generation tasks (e.g., verbal fluency,  categorization, analogies, synonyms/antonyms, etc.) with 80% accuracy using word finding strategies to facilitate improved word retrieval skills (to be achieved in 8-10 weeks).   -HEP review--Word finding: Patient was presented with a list of words and asked to generate antonyms. Task completed in 28/40 opp (70% acc) independently, increasing to 40/40 opp (100% acc) with min-mod semantic and occasional min phonemic cues.    Patient will name up to 5 features to describe a person/place/thing with 80% accuracy to facilitate improved utilization of circumlocution strategy (to be achieved in 8-10 weeks).     Patient will complete picture description tasks using language organization strategies with 80% accuracy to facilitate improved utilization of circumlocution strategy (to be achieved in 8-10 weeks).     Patient will answer complex verbal yes/no questions with 80% accuracy to facilitate increased auditory comprehension skills (to be achieved in 8-10 weeks).     Patient will follow multistep verbal directions with 80% accuracy to facilitate increased auditory comprehension skills (to be achieved in 8-10 weeks).     Patient will complete writing tasks at the sentence level with 80% accuracy to increase writing skills within FLE (to be achieved in 8-10 weeks).     Patient will read short paragraphs and answer multiple choice, yes/no, fill in the blank, and open-ended questions with 80% accuracy to facilitate increased comprehension of functional reading material (to be achieved in 8-10 weeks).     Plan:  -Patient was provided with home exercises/activities to target goals in plan of care at the end of today's session.  -Continue with current plan of care.

## 2024-07-15 NOTE — PROGRESS NOTES
Daily Speech Treatment Note    Today's date: 2024  Patient’s name: Galdino Gaines  : 1975  MRN: [id number]  Safety measures: h/o CVA  Referring provider: Evy Alvarez MD    Encounter Diagnosis     ICD-10-CM    1. Aphasia, late effect of cerebrovascular disease  I69.920       2. Cerebrovascular accident (CVA) of left basal ganglia (HCC)  I63.81         Visit Tracking:  POC   Expires Auth Expiration Date ST Visit Limit   2024 PENDING PENDING              Visit/Unit Tracking:  Auth Status Date 24          MA PENDING Used  13            Remaining BOMN BOMN BOMN            Subjective/Behavioral:  -Patient reported that he played a board game with his nieces over the weekend.    Objective/Assessment:  -Reviewed patient's home exercises/activities completed since last appointment. General category labeling completed in  opp independently, increasing to  opp with mod semantic cues from clinician.    Short-term goals:  Patient will be educated on word finding strategies (i.e., circumlocution) for improved generative naming and verbal expression skills (to be achieved in 1-2 weeks).     Patient will complete concrete and abstract categorization tasks to 90% accuracy to facilitate improved generative naming skills (to be achieved in 8-10 weeks).     Patient will complete word generation tasks (e.g., verbal fluency, categorization, analogies, synonyms/antonyms, etc.) with 80% accuracy using word finding strategies to facilitate improved word retrieval skills (to be achieved in 8-10 weeks).   -Word finding: Patient was presented with a list of words and asked to generate antonyms. Task completed in  opp (67% acc) independently, increasing to  opp (90% acc) with min-mod semantic cues, increasing to  opp (100% acc) with additional min phonemic cues.    Patient will name up to 5 features to describe a person/place/thing with 80% accuracy to facilitate  improved utilization of circumlocution strategy (to be achieved in 8-10 weeks).     Patient will complete picture description tasks using language organization strategies with 80% accuracy to facilitate improved utilization of circumlocution strategy (to be achieved in 8-10 weeks).     Patient will answer complex verbal yes/no questions with 80% accuracy to facilitate increased auditory comprehension skills (to be achieved in 8-10 weeks).     Patient will follow multistep verbal directions with 80% accuracy to facilitate increased auditory comprehension skills (to be achieved in 8-10 weeks).     Patient will complete writing tasks at the sentence level with 80% accuracy to increase writing skills within FLE (to be achieved in 8-10 weeks).   -Word identification/sentence generation: Patient completed a word finding/sequencing task, adding one letter to create a word (i.e., wo_ld = WORLD). Task completed in 9/10 opp (90% acc) independently. Next, patient was asked to generate a sentence using that target word. Task completed in 8/10 opp (80% acc) with an average sentence length of 7 words.    Patient will read short paragraphs and answer multiple choice, yes/no, fill in the blank, and open-ended questions with 80% accuracy to facilitate increased comprehension of functional reading material (to be achieved in 8-10 weeks).     Plan:  -Patient was provided with home exercises/activities to target goals in plan of care at the end of today's session.  -Continue with current plan of care.

## 2024-07-16 ENCOUNTER — OFFICE VISIT (OUTPATIENT)
Dept: SPEECH THERAPY | Facility: CLINIC | Age: 49
End: 2024-07-16
Payer: COMMERCIAL

## 2024-07-16 ENCOUNTER — OFFICE VISIT (OUTPATIENT)
Dept: NEUROLOGY | Facility: CLINIC | Age: 49
End: 2024-07-16
Payer: COMMERCIAL

## 2024-07-16 VITALS
SYSTOLIC BLOOD PRESSURE: 140 MMHG | OXYGEN SATURATION: 98 % | DIASTOLIC BLOOD PRESSURE: 90 MMHG | HEART RATE: 72 BPM | BODY MASS INDEX: 25.58 KG/M2 | HEIGHT: 67 IN | WEIGHT: 163 LBS

## 2024-07-16 DIAGNOSIS — I69.920 APHASIA, LATE EFFECT OF CEREBROVASCULAR DISEASE: Primary | ICD-10-CM

## 2024-07-16 DIAGNOSIS — E78.5 HYPERLIPIDEMIA, UNSPECIFIED HYPERLIPIDEMIA TYPE: ICD-10-CM

## 2024-07-16 DIAGNOSIS — I63.81 CEREBROVASCULAR ACCIDENT (CVA) OF LEFT BASAL GANGLIA (HCC): Primary | ICD-10-CM

## 2024-07-16 DIAGNOSIS — I63.81 CEREBROVASCULAR ACCIDENT (CVA) OF LEFT BASAL GANGLIA (HCC): ICD-10-CM

## 2024-07-16 PROCEDURE — 92507 TX SP LANG VOICE COMM INDIV: CPT

## 2024-07-16 PROCEDURE — 99215 OFFICE O/P EST HI 40 MIN: CPT | Performed by: PHYSICIAN ASSISTANT

## 2024-07-16 NOTE — PATIENT INSTRUCTIONS
Cerebrovascular accident (CVA) of left basal ganglia (HCC)  Pt denies any symptoms to suggest new stroke.  Pt should continue Aspirin and atorvastatin for secondary stroke prevention.  Continue with good blood pressure control; I would recommend monitoring at home at least 3 times per week; Goal of <130/80; above goal in the office but patient checks at home and it is at goal.  Continue with good cholesterol control; Goal LDL <70; will be having a repeat lipid panel  Continue with good blood sugar control; Goal HgbA1c <7.0; at goal  Pt was encouraged to get regular exercise and follow a Mediterranean diet.  He will continue with speech therapy.  He will follow-up with Cardiology once he has insurance.  If pt has any new stroke-like symptoms including sudden painless loss of vision or double vision, difficulty speaking or swallowing, vertigo / room spinning that does not quickly resolve, or weakness / numbness affecting 1 side of the face or body he should proceed by ambulance to the nearest emergency room immediately.  I have spent a total time of 60 minutes on 07/16/24 in caring for this patient including Diagnostic results, Prognosis, Risks and benefits of tx options, Instructions for management, Patient and family education, Importance of tx compliance, Risk factor reductions, Impressions, Counseling / Coordination of care, Documenting in the medical record, Reviewing / ordering tests, medicine, procedures  , and Obtaining or reviewing history  .  Pt will follow-up in 4 months.      Hyperlipidemia  Continue atorvastatin.  Lipid panel to be repeated in the next month or so.

## 2024-07-16 NOTE — ASSESSMENT & PLAN NOTE
Pt denies any symptoms to suggest new stroke.  Pt should continue Aspirin and atorvastatin for secondary stroke prevention.  Continue with good blood pressure control; I would recommend monitoring at home at least 3 times per week; Goal of <130/80; above goal in the office but patient checks at home and it is at goal.  Continue with good cholesterol control; Goal LDL <70; will be having a repeat lipid panel  Continue with good blood sugar control; Goal HgbA1c <7.0; at goal  Pt was encouraged to get regular exercise and follow a Mediterranean diet.  He will continue with speech therapy.  He will follow-up with Cardiology once he has insurance.  If pt has any new stroke-like symptoms including sudden painless loss of vision or double vision, difficulty speaking or swallowing, vertigo / room spinning that does not quickly resolve, or weakness / numbness affecting 1 side of the face or body he should proceed by ambulance to the nearest emergency room immediately.  I have spent a total time of 60 minutes on 07/16/24 in caring for this patient including Diagnostic results, Prognosis, Risks and benefits of tx options, Instructions for management, Patient and family education, Importance of tx compliance, Risk factor reductions, Impressions, Counseling / Coordination of care, Documenting in the medical record, Reviewing / ordering tests, medicine, procedures  , and Obtaining or reviewing history  .  Pt will follow-up in 4 months.

## 2024-07-16 NOTE — PROGRESS NOTES
Daily Speech Treatment Note    Today's date: 2024  Patient’s name: Galdino Gaines  : 1975  MRN: [id number]  Safety measures: h/o CVA  Referring provider: Evy Alvarez MD    Encounter Diagnosis     ICD-10-CM    1. Aphasia, late effect of cerebrovascular disease  I69.920       2. Cerebrovascular accident (CVA) of left basal ganglia (HCC)  I63.81         Visit Tracking:  POC   Expires Auth Expiration Date ST Visit Limit   2024 PENDING PENDING              Visit/Unit Tracking:  Auth Status Date 06/20/24 06/24/24 06/26/24 07/01/24 07/03/24 07/05/24 07/08/24 07/10/24 7/12/24 7/16/24   MA PENDING Used 1 2 3 4 5 6 7 8 9 10     Remaining                    Subjective/Behavioral:  -Patient participated in therapeutic activities    Objective/Assessment:  Reviewed HEP (determine object based on clues): completed with 72% accuracy independently; increasing to 80% accuracy given semantic cues    Short-term goals:  Patient will be educated on word finding strategies (i.e., circumlocution) for improved generative naming and verbal expression skills (to be achieved in 1-2 weeks).     Patient will complete concrete and abstract categorization tasks to 90% accuracy to facilitate improved generative naming skills (to be achieved in 8-10 weeks).     Patient completed concrete naming task looking at pictures: named objects with 80% accuracy given increased processing time    Patient will complete word generation tasks (e.g., verbal fluency, categorization, analogies, synonyms/antonyms, etc.) with 80% accuracy using word finding strategies to facilitate improved word retrieval skills (to be achieved in 8-10 weeks).     Patient completed antonym task with 40% accuracy independently; increasing to 70% accuracy given semantic cues; patient reports having difficulty thinking of the opposite    Patient will name up to 5 features to describe a person/place/thing with 80% accuracy to facilitate improved utilization of  circumlocution strategy (to be achieved in 8-10 weeks).     Patient participated in playing [email or web address] and named 5 features per object given visual prompt card and mild-mod cues in 5/5 trials    Patient will complete picture description tasks using language organization strategies with 80% accuracy to facilitate improved utilization of circumlocution strategy (to be achieved in 8-10 weeks).     Patient will answer complex verbal yes/no questions with 80% accuracy to facilitate increased auditory comprehension skills (to be achieved in 8-10 weeks).     Patient will follow multistep verbal directions with 80% accuracy to facilitate increased auditory comprehension skills (to be achieved in 8-10 weeks).     Patient will complete writing tasks at the sentence level with 80% accuracy to increase writing skills within FLE (to be achieved in 8-10 weeks).     Patient will read short paragraphs and answer multiple choice, yes/no, fill in the blank, and open-ended questions with 80% accuracy to facilitate increased comprehension of functional reading material (to be achieved in 8-10 weeks).       Plan:  -Patient was provided with home exercises/activities to target goals in plan of care at the end of today's session.  -Continue with current plan of care.

## 2024-07-16 NOTE — PROGRESS NOTES
Ambulatory Visit  Name: Galdino Gaines      : 1975      MRN: [id number]  Encounter Provider: Carmela Hodgson PA-C  Encounter Date: 2024   Encounter department: [facility] [place]    Assessment & Plan   1. Cerebrovascular accident (CVA) of left basal ganglia (HCC)  Assessment & Plan:  Pt denies any symptoms to suggest new stroke.  Pt should continue Aspirin and atorvastatin for secondary stroke prevention.  Continue with good blood pressure control; I would recommend monitoring at home at least 3 times per week; Goal of <130/80; above goal in the office but patient checks at home and it is at goal.  Continue with good cholesterol control; Goal LDL <70; will be having a repeat lipid panel  Continue with good blood sugar control; Goal HgbA1c <7.0; at goal  Pt was encouraged to get regular exercise and follow a Mediterranean diet.  He will continue with speech therapy.  He will follow-up with Cardiology once he has insurance.  If pt has any new stroke-like symptoms including sudden painless loss of vision or double vision, difficulty speaking or swallowing, vertigo / room spinning that does not quickly resolve, or weakness / numbness affecting 1 side of the face or body he should proceed by ambulance to the nearest emergency room immediately.  I have spent a total time of 60 minutes on 24 in caring for this patient including Diagnostic results, Prognosis, Risks and benefits of tx options, Instructions for management, Patient and family education, Importance of tx compliance, Risk factor reductions, Impressions, Counseling / Coordination of care, Documenting in the medical record, Reviewing / ordering tests, medicine, procedures  , and Obtaining or reviewing history  .  Pt will follow-up in 4 months.    2. Hyperlipidemia, unspecified hyperlipidemia type  Assessment & Plan:  Continue atorvastatin.  Lipid panel to be repeated in the next month or so.      History of  "Present Illness     Galdino Gaines is a 48 y.o. male who presents     Galdino Gaines is a right handed  48 y.o. male, with HTN, who presented to the office today after being hospitalized due to stroke in May. He is from [place] and was visiting his sister when the symptoms occurred. He presented with a 5 day history of intractable hiccups. His sister noticed that when he was checking in at the window that he could not say his birth date. He is fluent in English and has a Masters degree in finance. CT head scan revealed a subacute left basal ganglia infarct. He was admitted to the hospital and started on ASA and atorvastatin. Imaging and testing results below.    MRI brain revealed acute infarct in the left corona radiata extending into the left gangliocapsular region.    CTA head/neck negative for LVO, significant stenosis, or aneurysm.  Echo: EF 70%, grade 1 diastolic relaxation, normal atria size bilaterally  , cholesterol 282, A1C 5.0  RAYRAY without evidence of an embolic source  Thrombosis panel negative.    He has been attending speech therapy since being discharged from the hospital. He continues to have difficulty expressing himself and finding the correct words. He needs to write words before he is able to say them. He checks his BP at home and it is <130/80. He was seen by Cardiology but declined testing until he has insurance. He denies any symptoms to suggest new stroke.    A 10 point review of systems was negative except for what is noted in the HPI.     Medical History Reviewed by provider this encounter:  Tobacco  Allergies  Meds  Problems  Med Hx  Surg Hx  Fam Hx       Objective     /90 (BP Location: Right arm, Patient Position: Sitting, Cuff Size: Standard)   Pulse 72   Ht 5' 6.5\" (1.689 m)   Wt 73.9 kg (163 lb)   SpO2 98%   BMI 25.91 kg/m²     Alert, oriented x 4.  Expressive aphasia.  CN II-XII intact.  Strength 5/5 UE and LE.  Reflexes 2/4 UE and LE bilat.  Normal " gait.    Administrative Statements   I have spent a total time of 60 minutes in caring for this patient on the day of the visit/encounter including Diagnostic results, Prognosis, Risks and benefits of tx options, Instructions for management, Patient and family education, Importance of tx compliance, Risk factor reductions, Impressions, Counseling / Coordination of care, Documenting in the medical record, Reviewing / ordering tests, medicine, procedures  , and Obtaining or reviewing history  .

## 2024-07-17 ENCOUNTER — OFFICE VISIT (OUTPATIENT)
Dept: SPEECH THERAPY | Facility: CLINIC | Age: 49
End: 2024-07-17
Payer: COMMERCIAL

## 2024-07-17 DIAGNOSIS — I69.920 APHASIA, LATE EFFECT OF CEREBROVASCULAR DISEASE: Primary | ICD-10-CM

## 2024-07-17 DIAGNOSIS — I63.81 CEREBROVASCULAR ACCIDENT (CVA) OF LEFT BASAL GANGLIA (HCC): ICD-10-CM

## 2024-07-17 PROCEDURE — 92507 TX SP LANG VOICE COMM INDIV: CPT | Performed by: SPEECH-LANGUAGE PATHOLOGIST

## 2024-07-19 ENCOUNTER — OFFICE VISIT (OUTPATIENT)
Dept: SPEECH THERAPY | Facility: CLINIC | Age: 49
End: 2024-07-19
Payer: COMMERCIAL

## 2024-07-19 DIAGNOSIS — I63.81 CEREBROVASCULAR ACCIDENT (CVA) OF LEFT BASAL GANGLIA (HCC): ICD-10-CM

## 2024-07-19 DIAGNOSIS — I69.920 APHASIA, LATE EFFECT OF CEREBROVASCULAR DISEASE: Primary | ICD-10-CM

## 2024-07-19 PROCEDURE — 92507 TX SP LANG VOICE COMM INDIV: CPT

## 2024-07-19 NOTE — PROGRESS NOTES
"Daily Speech Treatment Note    Today's date: 2024  Patient’s name: Galdino Gaines  : 1975  MRN: [id number]  Safety measures: h/o CVA  Referring provider: Evy Alvarez MD    Encounter Diagnosis     ICD-10-CM    1. Aphasia, late effect of cerebrovascular disease  I69.920       2. Cerebrovascular accident (CVA) of left basal ganglia (HCC)  I63.81           Visit Tracking:  POC   Expires Auth Expiration Date ST Visit Limit   2024 PENDING PENDING              Visit/Unit Tracking:  Auth Status Date 24           MA PENDING Used 11 12             Remaining BOMN BOMN             Subjective/Behavioral:  -Patient reported he had a good week.    Objective/Assessment:  -Reviewed patient's home exercises/activities completed since last appointment. Providing synonyms completed in  opp.    Short-term goals:  Patient will be educated on word finding strategies (i.e., circumlocution) for improved generative naming and verbal expression skills (to be achieved in 1-2 weeks).     Patient will complete concrete and abstract categorization tasks to 90% accuracy to facilitate improved generative naming skills (to be achieved in 8-10 weeks).       To target generative naming skills; patient participated in \"scattergories\" game where they were asked to provide a word when given a specific letter and category (i.e., boys name __/L/ = Sinan). Task completed over 3 trials. Pt benefited from semantic cues during this task.    C - things at a zoo - I  J - things in a hotel - III  S - animals - IIII    Patient will complete word generation tasks (e.g., verbal fluency, categorization, analogies, synonyms/antonyms, etc.) with 80% accuracy using word finding strategies to facilitate improved word retrieval skills (to be achieved in 8-10 weeks).       Patient will name up to 5 features to describe a person/place/thing with 80% accuracy to facilitate improved utilization of circumlocution strategy (to be " achieved in 8-10 weeks).     To target circumlocution strategy, patient used semantic-feature analysis to describe objects 50% (3/6). Patient benefited from phonemic cues.    Patient will complete picture description tasks using language organization strategies with 80% accuracy to facilitate improved utilization of circumlocution strategy (to be achieved in 8-10 weeks).     Patient will answer complex verbal yes/no questions with 80% accuracy to facilitate increased auditory comprehension skills (to be achieved in 8-10 weeks).     Patient will follow multistep verbal directions with 80% accuracy to facilitate increased auditory comprehension skills (to be achieved in 8-10 weeks).       Patient will complete writing tasks at the sentence level with 80% accuracy to increase writing skills within FLE (to be achieved in 8-10 weeks).       Patient will read short paragraphs and answer multiple choice, yes/no, fill in the blank, and open-ended questions with 80% accuracy to facilitate increased comprehension of functional reading material (to be achieved in 8-10 weeks).       Plan:  -Patient was provided with home exercises/activities to target goals in plan of care at the end of today's session.  -Continue with current plan of care.

## 2024-07-19 NOTE — PROGRESS NOTES
Daily Speech Treatment Note    Today's date: 2024  Patient’s name: Galdino Gaines  : 1975  MRN: [id number]  Safety measures: h/o CVA  Referring provider: Evy Alvarez MD    Encounter Diagnosis     ICD-10-CM    1. Aphasia, late effect of cerebrovascular disease  I69.920       2. Cerebrovascular accident (CVA) of left basal ganglia (HCC)  I63.81         Visit Tracking:  POC   Expires Auth Expiration Date ST Visit Limit   2024 PENDING PENDING              Visit/Unit Tracking:  Auth Status Date 24           MA PENDING Used 11 12 13 14  15 16             Remaining BOMN BOMN BOMN BOMN  BOMN BOMN              Subjective/Behavioral:  -Patient stated that he felt well today.    Objective/Assessment:  -Reviewed patient's home exercises/activities completed since last appointment. Fill-in categories completed in / opp.    Short-term goals:  Patient will be educated on word finding strategies (i.e., circumlocution) for improved generative naming and verbal expression skills (to be achieved in 1-2 weeks).     Patient will complete concrete and abstract categorization tasks to 90% accuracy to facilitate improved generative naming skills (to be achieved in 8-10 weeks).     Patient will complete word generation tasks (e.g., verbal fluency, categorization, analogies, synonyms/antonyms, etc.) with 80% accuracy using word finding strategies to facilitate improved word retrieval skills (to be achieved in 8-10 weeks).   -Analogies: Patient was presented with an analogy and was asked to solve (e.g., “Hair is to head as nail is to ___.” = FINGER). Task completed in  opp (29% acc) independently, increasing to  opp (71% acc) with min-mod semantic cues, increasing to  opp (100% acc) with additional min phonemic cues.    -Object function: Patient was presented with a word and asked to identify 2 functions associated with each one. Task completed in  2/7 opp (29% acc) independently, increasing to 7/7 opp (100% acc) with mod semantic cues.    Patient will name up to 5 features to describe a person/place/thing with 80% accuracy to facilitate improved utilization of circumlocution strategy (to be achieved in 8-10 weeks).     Patient will complete picture description tasks using language organization strategies with 80% accuracy to facilitate improved utilization of circumlocution strategy (to be achieved in 8-10 weeks).     Patient will answer complex verbal yes/no questions with 80% accuracy to facilitate increased auditory comprehension skills (to be achieved in 8-10 weeks).     Patient will follow multistep verbal directions with 80% accuracy to facilitate increased auditory comprehension skills (to be achieved in 8-10 weeks).     Patient will complete writing tasks at the sentence level with 80% accuracy to increase writing skills within FLE (to be achieved in 8-10 weeks).     Patient will read short paragraphs and answer multiple choice, yes/no, fill in the blank, and open-ended questions with 80% accuracy to facilitate increased comprehension of functional reading material (to be achieved in 8-10 weeks).     Plan:  -Patient was provided with home exercises/activities to target goals in plan of care at the end of today's session.  -Continue with current plan of care.

## 2024-07-22 ENCOUNTER — OFFICE VISIT (OUTPATIENT)
Dept: SPEECH THERAPY | Facility: CLINIC | Age: 49
End: 2024-07-22
Payer: COMMERCIAL

## 2024-07-22 DIAGNOSIS — I69.920 APHASIA, LATE EFFECT OF CEREBROVASCULAR DISEASE: Primary | ICD-10-CM

## 2024-07-22 DIAGNOSIS — I63.81 CEREBROVASCULAR ACCIDENT (CVA) OF LEFT BASAL GANGLIA (HCC): ICD-10-CM

## 2024-07-22 PROCEDURE — 92507 TX SP LANG VOICE COMM INDIV: CPT | Performed by: SPEECH-LANGUAGE PATHOLOGIST

## 2024-07-22 NOTE — PROGRESS NOTES
Daily Speech Treatment Note    Today's date: 2024  Patient’s name: Galdino Gaines  : 1975  MRN: [id number]  Safety measures: h/o CVA  Referring provider: Evy Alvarez MD    Encounter Diagnosis     ICD-10-CM    1. Aphasia, late effect of cerebrovascular disease  I69.920       2. Cerebrovascular accident (CVA) of left basal ganglia (HCC)  I63.81         Visit Tracking:  POC   Expires Auth Expiration Date ST Visit Limit   2024 PENDING PENDING              Visit/Unit Tracking:  Auth Status Date 24         MA PENDING Used 11 12 13 14  15 16 17           Remaining BOMN BOMN BOMN BOMN  BOMN BOMN BOMN           Subjective/Behavioral:  -Patient reported that [birth year] his birthday.    Objective/Assessment:  -Reviewed patient's home exercises/activities completed since last appointment. Fill-in-the-letters (categorization) -- see below.    Short-term goals:  Patient will be educated on word finding strategies (i.e., circumlocution) for improved generative naming and verbal expression skills (to be achieved in 1-2 weeks).     Patient will complete concrete and abstract categorization tasks to 90% accuracy to facilitate improved generative naming skills (to be achieved in 8-10 weeks).   -Word finding/sequencing: Patient was presented with a category label and asked to fill-in-the-blanks with letters to form 10 words belonging to the category. Task completed in 47/60 opp (78% acc) independently, increasing to 53/60 opp (88% acc) with mod semantic cues.    Patient will complete word generation tasks (e.g., verbal fluency, categorization, analogies, synonyms/antonyms, etc.) with 80% accuracy using word finding strategies to facilitate improved word retrieval skills (to be achieved in 8-10 weeks).   -Object function: Patient was presented with a word and asked to identify 2 functions associated with each one. Task completed in 10/13 opp (77% acc)  "independently, increasing to 13/13 opp (100% acc) with mod-max semantic cues. Patient benefited from visual cues during exercise (e.g., viewing a photograph of a \"clothespin\" from the internet), as well as additional language processing time and verbal cues to expand upon utterance & use specific words other than \"things\".    Patient will name up to 5 features to describe a person/place/thing with 80% accuracy to facilitate improved utilization of circumlocution strategy (to be achieved in 8-10 weeks).     Patient will complete picture description tasks using language organization strategies with 80% accuracy to facilitate improved utilization of circumlocution strategy (to be achieved in 8-10 weeks).     Patient will answer complex verbal yes/no questions with 80% accuracy to facilitate increased auditory comprehension skills (to be achieved in 8-10 weeks).     Patient will follow multistep verbal directions with 80% accuracy to facilitate increased auditory comprehension skills (to be achieved in 8-10 weeks).     Patient will complete writing tasks at the sentence level with 80% accuracy to increase writing skills within FLE (to be achieved in 8-10 weeks).     Patient will read short paragraphs and answer multiple choice, yes/no, fill in the blank, and open-ended questions with 80% accuracy to facilitate increased comprehension of functional reading material (to be achieved in 8-10 weeks).     Plan:  -Patient was provided with home exercises/activities to target goals in plan of care at the end of today's session.  -Continue with current plan of care.  "

## 2024-07-23 NOTE — TELEPHONE ENCOUNTER
MSW phoned patient's sister, Meli, at [phone] to follow-up. Meli stated that she did return the needed financial documents for the [facility] Financial Assistance application about 2 weeks ago. Meli stated that they she is still waiting on the hearing about the appeal for additional [place] coverage.     MSW will follow-up with [facility] [employer].

## 2024-07-24 ENCOUNTER — OFFICE VISIT (OUTPATIENT)
Dept: SPEECH THERAPY | Facility: CLINIC | Age: 49
End: 2024-07-24
Payer: COMMERCIAL

## 2024-07-24 DIAGNOSIS — I69.920 APHASIA, LATE EFFECT OF CEREBROVASCULAR DISEASE: Primary | ICD-10-CM

## 2024-07-24 DIAGNOSIS — I63.81 CEREBROVASCULAR ACCIDENT (CVA) OF LEFT BASAL GANGLIA (HCC): ICD-10-CM

## 2024-07-24 PROCEDURE — 92507 TX SP LANG VOICE COMM INDIV: CPT | Performed by: SPEECH-LANGUAGE PATHOLOGIST

## 2024-07-25 NOTE — TELEPHONE ENCOUNTER
ZOYA phoned the [facility] [employer] office at [phone] and spoke with Marsha. She stated that they received the zero income worksheets and letters regarding no bank statements/taxes. Marsha stated that the application will be reviewed.    ZOYA will follow-up in about 2.5 weeks.

## 2024-07-26 ENCOUNTER — OFFICE VISIT (OUTPATIENT)
Dept: SPEECH THERAPY | Facility: CLINIC | Age: 49
End: 2024-07-26
Payer: COMMERCIAL

## 2024-07-26 DIAGNOSIS — I69.920 APHASIA, LATE EFFECT OF CEREBROVASCULAR DISEASE: Primary | ICD-10-CM

## 2024-07-26 DIAGNOSIS — I63.81 CEREBROVASCULAR ACCIDENT (CVA) OF LEFT BASAL GANGLIA (HCC): ICD-10-CM

## 2024-07-26 PROCEDURE — 92507 TX SP LANG VOICE COMM INDIV: CPT

## 2024-07-26 NOTE — PROGRESS NOTES
Daily Speech Treatment Note    Today's date: 2024  Patient’s name: Galdino Gaines  : 1975  MRN: [id number]  Safety measures: h/o CVA  Referring provider: Evy Alvarez MD    Encounter Diagnosis     ICD-10-CM    1. Aphasia, late effect of cerebrovascular disease  I69.920       2. Cerebrovascular accident (CVA) of left basal ganglia (HCC)  I63.81         Visit Tracking:  POC   Expires Auth Expiration Date ST Visit Limit   2024 PENDING PENDING              Visit/Unit Tracking:  Auth Status Date 24             MA PENDING Used 11 12 13 14  15               Remaining BOMN BOMN BOMN BOMN  BOMN               Subjective/Behavioral:  -Patient doing well today    Objective/Assessment: No HEP was returned today    Short-term goals:  Patient will be educated on word finding strategies (i.e., circumlocution) for improved generative naming and verbal expression skills (to be achieved in 1-2 weeks).     Patient will complete concrete and abstract categorization tasks to 90% accuracy to facilitate improved generative naming skills (to be achieved in 8-10 weeks).     Patient will complete word generation tasks (e.g., verbal fluency, categorization, analogies, synonyms/antonyms, etc.) with 80% accuracy using word finding strategies to facilitate improved word retrieval skills (to be achieved in 8-10 weeks).     Using Exclusion to Identify Objects (e.g., name a vegetable that is not green). Task completed in  opp    Completing Analogiys Statements (e.g., Arms have elbows, Legs have ____). Task completed in  opp; additional cues to achieve     Patient will name up to 5 features to describe a person/place/thing with 80% accuracy to facilitate improved utilization of circumlocution strategy (to be achieved in 8-10 weeks).     Patient will complete picture description tasks using language organization strategies with 80% accuracy to facilitate improved  utilization of circumlocution strategy (to be achieved in 8-10 weeks).     Patient will answer complex verbal yes/no questions with 80% accuracy to facilitate increased auditory comprehension skills (to be achieved in 8-10 weeks).     Patient will follow multistep verbal directions with 80% accuracy to facilitate increased auditory comprehension skills (to be achieved in 8-10 weeks).     Patient will complete writing tasks at the sentence level with 80% accuracy to increase writing skills within FLE (to be achieved in 8-10 weeks).     Fill in the letters (categories)- moderate difficulty with this task due to multiple letters missing per word. Additional letters and cues provided to complete first 3 categories; patient asked to take home to complete the last list.     Patient will read short paragraphs and answer multiple choice, yes/no, fill in the blank, and open-ended questions with 80% accuracy to facilitate increased comprehension of functional reading material (to be achieved in 8-10 weeks).     Plan:  -Patient was provided with home exercises/activities to target goals in plan of care at the end of today's session.  -Continue with current plan of care.

## 2024-07-29 ENCOUNTER — OFFICE VISIT (OUTPATIENT)
Dept: SPEECH THERAPY | Facility: CLINIC | Age: 49
End: 2024-07-29
Payer: COMMERCIAL

## 2024-07-29 DIAGNOSIS — I63.81 CEREBROVASCULAR ACCIDENT (CVA) OF LEFT BASAL GANGLIA (HCC): ICD-10-CM

## 2024-07-29 DIAGNOSIS — I69.920 APHASIA, LATE EFFECT OF CEREBROVASCULAR DISEASE: Primary | ICD-10-CM

## 2024-07-29 PROCEDURE — 92507 TX SP LANG VOICE COMM INDIV: CPT | Performed by: SPEECH-LANGUAGE PATHOLOGIST

## 2024-07-31 ENCOUNTER — OFFICE VISIT (OUTPATIENT)
Dept: SPEECH THERAPY | Facility: CLINIC | Age: 49
End: 2024-07-31
Payer: COMMERCIAL

## 2024-07-31 DIAGNOSIS — I63.81 CEREBROVASCULAR ACCIDENT (CVA) OF LEFT BASAL GANGLIA (HCC): ICD-10-CM

## 2024-07-31 DIAGNOSIS — I69.920 APHASIA, LATE EFFECT OF CEREBROVASCULAR DISEASE: Primary | ICD-10-CM

## 2024-07-31 PROCEDURE — 92507 TX SP LANG VOICE COMM INDIV: CPT | Performed by: SPEECH-LANGUAGE PATHOLOGIST

## 2024-08-02 ENCOUNTER — OFFICE VISIT (OUTPATIENT)
Dept: SPEECH THERAPY | Facility: CLINIC | Age: 49
End: 2024-08-02
Payer: COMMERCIAL

## 2024-08-02 DIAGNOSIS — I69.920 APHASIA, LATE EFFECT OF CEREBROVASCULAR DISEASE: Primary | ICD-10-CM

## 2024-08-02 DIAGNOSIS — I63.81 CEREBROVASCULAR ACCIDENT (CVA) OF LEFT BASAL GANGLIA (HCC): ICD-10-CM

## 2024-08-02 PROCEDURE — 92507 TX SP LANG VOICE COMM INDIV: CPT | Performed by: SPEECH-LANGUAGE PATHOLOGIST

## 2024-08-02 NOTE — PROGRESS NOTES
Daily Speech Treatment Note    Today's date: 2024  Patient’s name: Galdino Gaines  : 1975  MRN: [id number]  Safety measures: h/o CVA  Referring provider: Evy Alvarez MD    Encounter Diagnosis     ICD-10-CM    1. Aphasia, late effect of cerebrovascular disease  I69.920       2. Cerebrovascular accident (CVA) of left basal ganglia (HCC)  I63.81         Visit Tracking:  POC   Expires Auth Expiration Date ST Visit Limit   2024 PENDING PENDING              Visit/Unit Tracking:  Auth Status Date 24     MA PENDING Used 11 12 13 14  15 16 17  18  19       Remaining BOMN BOMN BOMN BOMN  BOMN BOMN BOMN BOMN  BOMN       Subjective/Behavioral:  -Patient was in good spirits    Objective/Assessment:  -Reviewed patient's home exercises/activities completed since last appointment. Fill-in-sentence ending - 100% accuracy and Jacqueline's Schedule - 90% accuracy.    Short-term goals:  Patient will be educated on word finding strategies (i.e., circumlocution) for improved generative naming and verbal expression skills (to be achieved in 1-2 weeks).     Patient will complete concrete and abstract categorization tasks to 90% accuracy to facilitate improved generative naming skills (to be achieved in 8-10 weeks).       Patient will complete word generation tasks (e.g., verbal fluency, categorization, analogies, synonyms/antonyms, etc.) with 80% accuracy using word finding strategies to facilitate improved word retrieval skills (to be achieved in 8-10 weeks).   To target higher level generative naming, patient completed Quicktionary activity/game using multiple word and letter restrictions (i.e., word associated with sports, contains the letter T, is exactly 2 syllables). Patient completed using 2 cards (red and yellow)/restrictions over 10 trials with 80% accuracy, provided min-mod cueing.      Task progressed to having the patient spell the  word named. Task completed with 80% accuracy, provided min cueing.     Lastly, the patient was tasked to create a sentence using the word named. Task completed with 90% accuracy, provided min cueing.       Patient will name up to 5 features to describe a person/place/thing with 80% accuracy to facilitate improved utilization of circumlocution strategy (to be achieved in 8-10 weeks).     Patient will complete picture description tasks using language organization strategies with 80% accuracy to facilitate improved utilization of circumlocution strategy (to be achieved in 8-10 weeks).     Patient will answer complex verbal yes/no questions with 80% accuracy to facilitate increased auditory comprehension skills (to be achieved in 8-10 weeks). .     Patient will follow multistep verbal directions with 80% accuracy to facilitate increased auditory comprehension skills (to be achieved in 8-10 weeks).     Patient will complete writing tasks at the sentence level with 80% accuracy to increase writing skills within FLE (to be achieved in 8-10 weeks).     Patient will read short paragraphs and answer multiple choice, yes/no, fill in the blank, and open-ended questions with 80% accuracy to facilitate increased comprehension of functional reading material (to be achieved in 8-10 weeks).       Plan:  -Patient was provided with home exercises/activities to target goals in plan of care at the end of today's session.  -Continue with current plan of care.

## 2024-08-05 ENCOUNTER — OFFICE VISIT (OUTPATIENT)
Dept: SPEECH THERAPY | Facility: CLINIC | Age: 49
End: 2024-08-05
Payer: COMMERCIAL

## 2024-08-05 DIAGNOSIS — I63.81 CEREBROVASCULAR ACCIDENT (CVA) OF LEFT BASAL GANGLIA (HCC): ICD-10-CM

## 2024-08-05 DIAGNOSIS — I69.920 APHASIA, LATE EFFECT OF CEREBROVASCULAR DISEASE: Primary | ICD-10-CM

## 2024-08-05 PROCEDURE — 92507 TX SP LANG VOICE COMM INDIV: CPT | Performed by: SPEECH-LANGUAGE PATHOLOGIST

## 2024-08-07 ENCOUNTER — OFFICE VISIT (OUTPATIENT)
Dept: SPEECH THERAPY | Facility: CLINIC | Age: 49
End: 2024-08-07
Payer: COMMERCIAL

## 2024-08-07 DIAGNOSIS — I69.920 APHASIA, LATE EFFECT OF CEREBROVASCULAR DISEASE: Primary | ICD-10-CM

## 2024-08-07 DIAGNOSIS — I63.81 CEREBROVASCULAR ACCIDENT (CVA) OF LEFT BASAL GANGLIA (HCC): ICD-10-CM

## 2024-08-07 PROCEDURE — 92507 TX SP LANG VOICE COMM INDIV: CPT | Performed by: SPEECH-LANGUAGE PATHOLOGIST

## 2024-08-07 NOTE — PROGRESS NOTES
Daily Speech Treatment Note    Today's date: 2024  Patient’s name: Galdino Gaines  : 1975  MRN: [id number]  Safety measures: h/o CVA  Referring provider: Evy Alvarez MD    Encounter Diagnosis     ICD-10-CM    1. Aphasia, late effect of cerebrovascular disease  I69.920       2. Cerebrovascular accident (CVA) of left basal ganglia (HCC)  I63.81         Visit Tracking:  POC   Expires Auth Expiration Date ST Visit Limit   2024 PENDING PENDING              Visit/Unit Tracking:  Auth Status Date 24   MA PENDING Used 11 12 13 14  15 16 17  18  19  20     Remaining BOMN BOMN BOMN BOMN  BOMN BOMN BOMN BOMN  BOMN  BOMN     Subjective/Behavioral:  -Patient was in good spirits. He reports he is making progress and is hoping to complete tasks more quickly.     Objective/Assessment:  -Reviewed patient's home exercises/activities completed since last appointment. Fill-in-sentence in paragraph level with 100% accuracy.    Short-term goals:  Patient will be educated on word finding strategies (i.e., circumlocution) for improved generative naming and verbal expression skills (to be achieved in 1-2 weeks).     Patient will complete concrete and abstract categorization tasks to 90% accuracy to facilitate improved generative naming skills (to be achieved in 8-10 weeks).     Patient will complete word generation tasks (e.g., verbal fluency, categorization, analogies, synonyms/antonyms, etc.) with 80% accuracy using word finding strategies to facilitate improved word retrieval skills (to be achieved in 8-10 weeks).   To target higher level generative naming, patient completed naming activity using multiple word and letter restrictions (i.e., colors, sports, cities that start with S, B).      Task progressed to having the patient spell the word named. Task completed with 80% accuracy, provided min cueing.    Patient then  "completed naming activity where he was provided with a complex category (dog breed, natural disaster, etc) He completed this task with 90% accuracy with moderate cueing provided. Semantic cue facilitated accuracy. He was instructed to use circumlocution as a strategy and this resulted in increased speed of execution for task. For example, when naming teas he said \"some are made with leaves\" when prompted to use this strategy. He then said peppermint.      Patient will name up to 5 features to describe a person/place/thing with 80% accuracy to facilitate improved utilization of circumlocution strategy (to be achieved in 8-10 weeks).     Patient will complete picture description tasks using language organization strategies with 80% accuracy to facilitate improved utilization of circumlocution strategy (to be achieved in 8-10 weeks).     Patient will answer complex verbal yes/no questions with 80% accuracy to facilitate increased auditory comprehension skills (to be achieved in 8-10 weeks). .     Patient will follow multistep verbal directions with 80% accuracy to facilitate increased auditory comprehension skills (to be achieved in 8-10 weeks).     Patient will complete writing tasks at the sentence level with 80% accuracy to increase writing skills within FLE (to be achieved in 8-10 weeks).     Patient will read short paragraphs and answer multiple choice, yes/no, fill in the blank, and open-ended questions with 80% accuracy to facilitate increased comprehension of functional reading material (to be achieved in 8-10 weeks).       Plan:  -Patient was provided with home exercises/activities to target goals in plan of care at the end of today's session.  -Continue with current plan of care.  "

## 2024-08-08 ENCOUNTER — APPOINTMENT (OUTPATIENT)
Dept: SPEECH THERAPY | Facility: CLINIC | Age: 49
End: 2024-08-08
Payer: COMMERCIAL

## 2024-08-11 NOTE — PROGRESS NOTES
Speech-Language Pathology Re-Evaluation    Today's date: 2024  Patient’s name: Galdino Gaines  : 1975  MRN: [id number]  Safety measures: h/o CVA  Referring provider: Evy Alvarez MD    Encounter Diagnosis     ICD-10-CM    1. Aphasia, late effect of cerebrovascular disease  I69.920       2. Cerebrovascular accident (CVA) of left basal ganglia (HCC)  I63.81           Assessment:   Patient presents with moderate (improving) aphasia s/p CVA. It should be noted that patient is bilingual (primary language is Twi--patient is from [facility]; he is also fluent in English). Testing scores should be interpreted with caution. Verbal expression deficits can be c/b difficulty with word retrieval and verbal fluency. Patient with echolalic speech present. Conversation about familiar subjects is possible with help from the listener. There are frequent failures to convey the idea, but the patient shares the burden of communication. Patient benefits from additional language processing time, as well as semantic and phonemic cues as needed to increase communication success. Verbal repetition is intact. Graphic expression deficits can be c/b difficulty with accuracy of spelling and reduced organization of sentences. Auditory comprehension deficits can be c/b difficulty with answering complex yes/no questions and following commands, as well as reduced processing speed. Patient benefits from verbal repetition and rephrasing, as well as increased processing time to increase communication success. Patient with improved reading comprehension; patient benefits from additional time, as well as reading material a second time through, as compensatory strategies. Patient would benefit from continued outpatient skilled Speech Therapy services to promote positive communication interactions with both familiar & unfamiliar listeners, for further education/training compensatory strategies, to follow directions within functional  activities, to increase participation in meaningful activities, to facilitate overall improved quality of life, to reduce caregiver burden, and to receive instruction on HEP.      Short-term goals:   Patient will be educated on word finding strategies (i.e., circumlocution) for improved generative naming and verbal expression skills (to be achieved in 1-2 weeks). -- MET     Patient will complete concrete and abstract categorization tasks to 90% accuracy to facilitate improved generative naming skills (to be achieved in 8-10 weeks). -- PARTIALLY MET     Patient will complete word generation tasks (e.g., verbal fluency, categorization, analogies, synonyms/antonyms, etc.) with 80% accuracy using word finding strategies to facilitate improved word retrieval skills (to be achieved in 8-10 weeks). -- PARTIALLY MET     Patient will name up to 5 features to describe a person/place/thing with 80% accuracy to facilitate improved utilization of circumlocution strategy (to be achieved in 8-10 weeks). -- PARTIALLY MET     Patient will complete picture description tasks using language organization strategies with 80% accuracy to facilitate improved utilization of circumlocution strategy (to be achieved in 8-10 weeks). -- PARTIALLY MET     Patient will answer complex verbal yes/no questions with 80% accuracy to facilitate increased auditory comprehension skills (to be achieved in 8-10 weeks). -- PARTIALLY MET     Patient will follow multistep verbal directions with 80% accuracy to facilitate increased auditory comprehension skills (to be achieved in 8-10 weeks). -- PARTIALLY MET     Patient will complete writing tasks at the sentence level with 80% accuracy to increase writing skills within FLE (to be achieved in 8-10 weeks). -- PARTIALLY MET     Patient will read short paragraphs and answer multiple choice, yes/no, fill in the blank, and open-ended questions with 80% accuracy to facilitate increased comprehension of functional  "reading material (to be achieved in 8-10 weeks). -- MET     NEW GOAL:  Patient will complete BDAE (short form assessment--[place] Naming Test & Narrative Writing) testing for further development of POC/goals (to be achieved in 1-2 weeks).     Long-term goals:  Patient will demonstrate improved expressive and receptive language skills from moderate to mild-WNL during structured and unstructured tasks (to be achieved by discharge). -- PARTIALLY MET     Patient will improve ability to facilitate communication to meet needs including use of compensatory strategies to promote meaningful interactions for improved quality of life and maximize level of independence (to be achieved by discharge). -- PARTIALLY MET      Plan:  Patient would benefit from outpatient skilled Speech Therapy services: Speech-language therapy    Frequency: 2x weekly  Duration: 2 months    Intervention certification from: 8/12/2024  Intervention certification to: 10/12/2024      Subjective:  Patient reported that he has noticed improvement in his communication abilities; however, he is not functioning at baseline level.    Patient's goal(s): \"to recollect and be fast with speaking, and to be able to write well\"       Objective (testing):  Communication Confidence Rating Scale for Aphasia (CCRSA) (0 = “not confident”, 50 = “moderately confident”, 100 = “very confident”)    How confident are you about your ability to talk with people? - 80/100    How confident are you about your ability to stay in touch with family and friends? - 90/100    How confident are you that people include you in conversations? - 70/100    How confident are you about your ability to follow news and sports on TV? - 100/100    How confident are you about your ability to follow movies on TV or in a theatre? - 80/100    How confident are you about your ability to speak on the phone? - 80/100    How confident are you that people understand you when you talk? - 80/100    How confident " are you that you can make your own decisions? - 90/100    How confident are you about your ability to speak for yourself? - 70/100    How confident are you that you can participate in conversation about your finances? - 90/100     TOTAL - 83% confidence      The [place] Diagnostic Aphasia Examination-Third Edition (BDAE-3) is a comprehensive standardized assessment designed to evaluate a broad range of language impairments that often arise as a consequence of organic brain dysfunction. The BDAE-3 is divided into five subtests, including conversational & expository speech, auditory comprehension, oral expression, reading, and writing. The results of the BDAE-3 are used to classify a patient’s language profiles into one of the localization-based classifications of aphasia: Broca’s, Wernicke’s, anomic, conduction, transcortical, transcortical motor, transcortical sensory, and global aphasia syndromes, although the test does not always provide a diagnosis or a therapeutic approach. The following results were obtained during the administration of the short form assessment:       Score: Percentile:   SEVERITY RATIN.5/5 65%ile        FLUENCY:     -Phrase length (rating scale) 7 25%ile   -Melodic line (rating scale) 6/7 60%ile   -Grammatical form (rating scale) 3/7 20%ile        CONVERSATION/EXPOSITORY SPEECH:    -Simple social responses  100%ile        AUDITORY COMPREHENSION:     -Basic word discrimination 14/16 40%ile   -Commands 10/10 100%ile   -Complex ideational material 3/6 30%ile        ARTICULATION:     -Articulatory agility (rating scale) / 70%ile        RECITATION:     -Automatized sequences  100%ile        REPETITION:     -Words /5 100%ile    -Sentences /2 100%ile        NAMING:     -Responsive naming 6/10 30%ile   -[place] Naming Test - short DNR DNT   -Special categories  100%ile        READING:     -Matching cases & scripts  100%ile   -Number matching  100%ile   -Picture-word matching  "3/4 30%ile   -Oral word reading 12/15 30%ile   -Oral sentence reading 5/5 100%ile   -Oral sentence comprehension 3/3 100%ile   -Sentence/paragraph comprehension 4/4 100%ile        WRITING:     -Form 14/14 100%ile   -Letter choice 21/21 100%ile   -Motor facility 14/14 100%ile   -Primer words 4/4 100%ile   -Regular phonics 1/2 50%ile   -Common irregular words 2/3 60%ile   -Written picture naming  1/4 30%ile   -Narrative writing DNT DNT       \"DNT\" = Did Not Test  *Due to time constraints, only portions of this standardized test were administered on this date of service.*      Overall, patient presents with a moderate expressive/receptive aphasia with a severity rating of 2.5 (out of a possible 5 being fluent speech).    0 - No usable speech or auditory comprehension.     1 - All communication is through fragmentary expression; great need for inference, questioning, and guessing by the listener. The range of information that can be exchanged is limited, and the listener carries the burden of communication.      2 - Conversation about familiar subjects is possible with help from the listener. There are frequent failures to convey the idea, but the patient shares the burden of communication.      3 - The patient can discuss almost all everyday problems with little or no assistance. Reduction of speech and/or comprehension, however, makes conversation about certain material difficult or impossible.      4 - Some obvious loss of fluency in speech or facility of comprehension, without significant limitation on ideas expressed or form of expression.      5 - Minimal discernible speech handicap; the patient may have subjective difficulties that are not obvious to the listener.      Treatment:  -Word finding/thought organization: Patient was presented with four category labels and asked to name category members provided an initial letter (4x4 grid). Task completed in 24/32 opp (75% acc) independently, increasing to 32/32 opp " (100% acc) with mod semantic cues.    -HEP review (new worksheets).    Visit Tracking:  POC   Expires Auth Expiration Date ST Visit Limit   10/12/2024 PENDING PENDING              Visit/Unit Tracking:  Auth Status Date 08/12/24            MA PENDING Used 21              Remaining BOMN BOMN BOMN BOMN  BOMN BOMN BOMN BOMN  BOMN  BOMN     Intervention comments:  -Re-evaluation/administration of standardized test with patient (40 minutes)  -Scoring/interpretation of the standardized test for the development of POC, and write-up of the report (60 minutes)  -Speech-language treatment (see above) (10 minutes)

## 2024-08-12 ENCOUNTER — EVALUATION (OUTPATIENT)
Dept: SPEECH THERAPY | Facility: CLINIC | Age: 49
End: 2024-08-12
Payer: COMMERCIAL

## 2024-08-12 DIAGNOSIS — I69.920 APHASIA, LATE EFFECT OF CEREBROVASCULAR DISEASE: Primary | ICD-10-CM

## 2024-08-12 DIAGNOSIS — I63.81 CEREBROVASCULAR ACCIDENT (CVA) OF LEFT BASAL GANGLIA (HCC): ICD-10-CM

## 2024-08-12 PROCEDURE — 96105 ASSESSMENT OF APHASIA: CPT | Performed by: SPEECH-LANGUAGE PATHOLOGIST

## 2024-08-12 PROCEDURE — 92507 TX SP LANG VOICE COMM INDIV: CPT | Performed by: SPEECH-LANGUAGE PATHOLOGIST

## 2024-08-13 NOTE — PROGRESS NOTES
Daily Speech Treatment Note    Today's date: 2024   Patient’s name: Galdino Gaines  : 1975  MRN: [id number]  Safety measures:  h/o CVA   Referring provider: Evy Alvarez MD    Encounter Diagnosis     ICD-10-CM    1. Aphasia, late effect of cerebrovascular disease  I69.920       2. Cerebrovascular accident (CVA) of left basal ganglia (HCC)  I63.81         Visit Tracking:  POC   Expires Auth Expiration Date ST Visit Limit   10/12/2024 PENDING PENDING              Visit/Unit Tracking:  Auth Status Date 24                   MA PENDING Used                      Remaining BOMN BOMN BOMN BOMN  BOMN BOMN BOMN BOMN  BOMN  BOMN         Subjective/Behavioral:  - Patient was in good spirits.     Objective/Assessment:  -Patient completed all 3 homework sheets (Adding a letter to a word with alphabet provided) - 2 sheets - 100%, 1 sheet - 85%    Short-term goals:  Patient will complete concrete and abstract categorization tasks to 90% accuracy to facilitate improved generative naming skills (to be achieved in 8-10 weeks). -- PARTIALLY MET     Patient will complete word generation tasks (e.g., verbal fluency, categorization, analogies, synonyms/antonyms, etc.) with 80% accuracy using word finding strategies to facilitate improved word retrieval skills (to be achieved in 8-10 weeks). -- PARTIALLY MET  To target expressive language: Patient was verbally presented with a target word and was instructed to state one word that means the opposite (I.e. Antonyms).  Patient completed this task in 21/24 (88%) opportunities, given min- mod cueing.    Patient was provided with a given word (e.g. beautiful) and asked to provide a synonym. Patient completed task with 65% accuracy  in  15/23 trials with moderate verbal cueing.       Patient will name up to 5 features to describe a person/place/thing with 80% accuracy to facilitate improved utilization of circumlocution strategy (to be achieved in 8-10  weeks). -- PARTIALLY MET     Patient will complete picture description tasks using language organization strategies with 80% accuracy to facilitate improved utilization of circumlocution strategy (to be achieved in 8-10 weeks). -- PARTIALLY MET     Patient will answer complex verbal yes/no questions with 80% accuracy to facilitate increased auditory comprehension skills (to be achieved in 8-10 weeks). -- PARTIALLY MET     Patient will follow multistep verbal directions with 80% accuracy to facilitate increased auditory comprehension skills (to be achieved in 8-10 weeks). -- PARTIALLY MET     Patient will complete writing tasks at the sentence level with 80% accuracy to increase writing skills within FLE (to be achieved in 8-10 weeks). -- PARTIALLY MET      NEW GOAL:  Patient will complete BDAE (short form assessment--[place] Naming Test & Narrative Writing) testing for further development of POC/goals (to be achieved in 1-2 weeks).        Plan:  -Continue with current plan of care.

## 2024-08-13 NOTE — TELEPHONE ENCOUNTER
ZOYA phoned the [facility] [employer] office at [phone] and spoke with Marva. She stated that they have all they need to process the application but that it is still pending at this time. Marva suggested a callback in about 2 weeks to check status of the application. ZOYA will update patient's sister as able.

## 2024-08-14 ENCOUNTER — OFFICE VISIT (OUTPATIENT)
Dept: SPEECH THERAPY | Facility: CLINIC | Age: 49
End: 2024-08-14
Payer: COMMERCIAL

## 2024-08-14 DIAGNOSIS — I63.81 CEREBROVASCULAR ACCIDENT (CVA) OF LEFT BASAL GANGLIA (HCC): ICD-10-CM

## 2024-08-14 DIAGNOSIS — I69.920 APHASIA, LATE EFFECT OF CEREBROVASCULAR DISEASE: Primary | ICD-10-CM

## 2024-08-14 PROCEDURE — 92507 TX SP LANG VOICE COMM INDIV: CPT | Performed by: SPEECH-LANGUAGE PATHOLOGIST

## 2024-08-15 ENCOUNTER — APPOINTMENT (OUTPATIENT)
Dept: SPEECH THERAPY | Facility: CLINIC | Age: 49
End: 2024-08-15
Payer: COMMERCIAL

## 2024-08-18 NOTE — PROGRESS NOTES
Daily Speech Treatment Note    Today's date: 2024  Patient’s name: Galdino Gaines  : 1975  MRN: [id number]  Safety measures: h/o CVA  Referring provider: Evy Alvarez MD    Encounter Diagnosis     ICD-10-CM    1. Aphasia, late effect of cerebrovascular disease  I69.920       2. Cerebrovascular accident (CVA) of left basal ganglia (HCC)  I63.81         Visit Tracking:  POC   Expires Auth Expiration Date ST Visit Limit   10/12/2024 PENDING PENDING              Visit/Unit Tracking:  Auth Status Date 24          MA PENDING Used             Remaining BOMN BOMN BOMN BOMN  BOMN BOMN BOMN BOMN  BOMN  BOMN     Subjective/Behavioral:  -Patient reported that he was feeling well today.    Objective/Assessment:    Short-term goals:  Patient will complete concrete and abstract categorization tasks to 90% accuracy to facilitate improved generative naming skills (to be achieved in 8-10 weeks). -- PARTIALLY MET     Patient will complete word generation tasks (e.g., verbal fluency, categorization, analogies, synonyms/antonyms, etc.) with 80% accuracy using word finding strategies to facilitate improved word retrieval skills (to be achieved in 8-10 weeks). -- PARTIALLY MET     Patient will name up to 5 features to describe a person/place/thing with 80% accuracy to facilitate improved utilization of circumlocution strategy (to be achieved in 8-10 weeks). -- PARTIALLY MET     Patient will complete picture description tasks using language organization strategies with 80% accuracy to facilitate improved utilization of circumlocution strategy (to be achieved in 8-10 weeks). -- PARTIALLY MET     Patient will answer complex verbal yes/no questions with 80% accuracy to facilitate increased auditory comprehension skills (to be achieved in 8-10 weeks). -- PARTIALLY MET     Patient will follow multistep verbal directions with 80% accuracy to facilitate increased auditory comprehension skills (to  "be achieved in 8-10 weeks). -- PARTIALLY MET     Patient will complete writing tasks at the sentence level with 80% accuracy to increase writing skills within FLE (to be achieved in 8-10 weeks). -- PARTIALLY MET     NEW GOAL:  Patient will complete BDAE (short form assessment--[place] Naming Test & Narrative Writing) testing for further development of POC/goals (to be achieved in 1-2 weeks).   -Completed additional standardized assessment during today's diagnostic therapy session today (BNT).    The [place] Naming Test-Second Edition (BNT-2) is a confrontational naming assessment that asks patients to provide the best name for a given picture. It was designed to detect word-finding impairments. The BNT-2 consists of 60 pictures, ordered from easiest to most difficult. Stimulus cues, including semantic, phonemic, and written information, are provided as necessary.      The following results were obtained during the administration of the assessment:     Summary of Scores: Score:   1. Number of spontaneously given correct response: 20       2. Number of stimulus cues given:  40   3. Number of correct responses following a stimulus cue:  0       *TOTAL SCORE: 20   Percentile Rank:  30%ile       Additional Cuein. Number of phonemic cues: 40   5. Number of correct responses following the phonemic cue: 11       6. Number of multiple choices given:  DNT   7. Number of correct choices: DNT     DNT = \"did not assess\" due to time constraints    *It should be noted that patient attempt to self-cue during moments of anomia. Patient utilized circumlocution, including description of the pictured objects, their function, etc. Patient also implemented gesturing. More \"general\" terms were noted (e.g., \"tree\" for cactus, \"bird\" for pelican, and \"boat\" for canoe). Phonemic cues presented by clinician were only partially-effective. Due to time constraints, multiple choice cues were unable to be presented on this date of " service.    Plan:  -Patient was provided with home exercises/activities to target goals in plan of care at the end of today's session.  -Continue with current plan of care.

## 2024-08-18 NOTE — PROGRESS NOTES
Daily Speech Treatment Note    Today's date: 2024 ***  Patient’s name: Galdino Gaines  : 1975  MRN: [id number]  Safety measures: h/o CVA  Referring provider: Evy Alvarez MD    Encounter Diagnosis     ICD-10-CM    1. Aphasia, late effect of cerebrovascular disease  I69.920       2. Cerebrovascular accident (CVA) of left basal ganglia (HCC)  I63.81         Visit Tracking:  ***    Subjective/Behavioral:  -***    Objective/Assessment:  -Reviewed patient's home exercises/activities completed since last appointment. ***    Short-term goals:  Patient will complete concrete and abstract categorization tasks to 90% accuracy to facilitate improved generative naming skills (to be achieved in 8-10 weeks). -- PARTIALLY MET     Patient will complete word generation tasks (e.g., verbal fluency, categorization, analogies, synonyms/antonyms, etc.) with 80% accuracy using word finding strategies to facilitate improved word retrieval skills (to be achieved in 8-10 weeks). -- PARTIALLY MET     Patient will name up to 5 features to describe a person/place/thing with 80% accuracy to facilitate improved utilization of circumlocution strategy (to be achieved in 8-10 weeks). -- PARTIALLY MET     Patient will complete picture description tasks using language organization strategies with 80% accuracy to facilitate improved utilization of circumlocution strategy (to be achieved in 8-10 weeks). -- PARTIALLY MET     Patient will answer complex verbal yes/no questions with 80% accuracy to facilitate increased auditory comprehension skills (to be achieved in 8-10 weeks). -- PARTIALLY MET     Patient will follow multistep verbal directions with 80% accuracy to facilitate increased auditory comprehension skills (to be achieved in 8-10 weeks). -- PARTIALLY MET     Patient will complete writing tasks at the sentence level with 80% accuracy to increase writing skills within FLE (to be achieved in 8-10 weeks). -- PARTIALLY MET      NEW GOAL:  Patient will complete BDAE (short form assessment--[place] Naming Test & Narrative Writing) testing for further development of POC/goals (to be achieved in 1-2 weeks).     Plan:  -Patient was provided with home exercises/activities to target goals in plan of care at the end of today's session.  -Continue with current plan of care.

## 2024-08-19 ENCOUNTER — OFFICE VISIT (OUTPATIENT)
Dept: SPEECH THERAPY | Facility: CLINIC | Age: 49
End: 2024-08-19
Payer: COMMERCIAL

## 2024-08-19 DIAGNOSIS — I69.920 APHASIA, LATE EFFECT OF CEREBROVASCULAR DISEASE: Primary | ICD-10-CM

## 2024-08-19 DIAGNOSIS — I63.81 CEREBROVASCULAR ACCIDENT (CVA) OF LEFT BASAL GANGLIA (HCC): ICD-10-CM

## 2024-08-19 PROCEDURE — 92507 TX SP LANG VOICE COMM INDIV: CPT | Performed by: SPEECH-LANGUAGE PATHOLOGIST

## 2024-08-21 ENCOUNTER — OFFICE VISIT (OUTPATIENT)
Dept: SPEECH THERAPY | Facility: CLINIC | Age: 49
End: 2024-08-21
Payer: COMMERCIAL

## 2024-08-21 DIAGNOSIS — I69.920 APHASIA, LATE EFFECT OF CEREBROVASCULAR DISEASE: Primary | ICD-10-CM

## 2024-08-21 DIAGNOSIS — I63.81 CEREBROVASCULAR ACCIDENT (CVA) OF LEFT BASAL GANGLIA (HCC): ICD-10-CM

## 2024-08-21 PROCEDURE — 92507 TX SP LANG VOICE COMM INDIV: CPT

## 2024-08-21 NOTE — PROGRESS NOTES
Daily Speech Treatment Note    Today's date: 2024  Patient’s name: Galdino Gaines  : 1975  MRN: [id number]  Safety measures: h/o CVA  Referring provider: Evy Alvarez MD    Encounter Diagnosis     ICD-10-CM    1. Aphasia, late effect of cerebrovascular disease  I69.920       2. Cerebrovascular accident (CVA) of left basal ganglia (HCC)  I63.81         Visit Tracking:  POC   Expires Auth Expiration Date ST Visit Limit   10/12/2024 PENDING PENDING              Visit/Unit Tracking:  Auth Status Date 24         MA PENDING Used            Remaining BOMN BOMN BOMN BOMN  BOMN BOMN BOMN BOMN  BOMN  BOMN     Subjective/Behavioral:  -Patient was pleasant and participated in therapeutic activities    Objective/Assessment:    Short-term goals:  Patient will complete concrete and abstract categorization tasks to 90% accuracy to facilitate improved generative naming skills (to be achieved in 8-10 weeks). -- PARTIALLY MET     Patient will complete word generation tasks (e.g., verbal fluency, categorization, analogies, synonyms/antonyms, etc.) with 80% accuracy using word finding strategies to facilitate improved word retrieval skills (to be achieved in 8-10 weeks). -- PARTIALLY MET    Patient completed analogies with 75% accuracy independently     Patient will name up to 5 features to describe a person/place/thing with 80% accuracy to facilitate improved utilization of circumlocution strategy (to be achieved in 8-10 weeks). -- PARTIALLY MET     To target expressive language, patient named 5+ features x10 when looking at pictures given visual cue card given minimal cues    Patient will complete picture description tasks using language organization strategies with 80% accuracy to facilitate improved utilization of circumlocution strategy (to be achieved in 8-10 weeks). -- PARTIALLY MET     Patient will answer complex verbal yes/no questions with 80% accuracy to  "facilitate increased auditory comprehension skills (to be achieved in 8-10 weeks). -- PARTIALLY MET     Patient will follow multistep verbal directions with 80% accuracy to facilitate increased auditory comprehension skills (to be achieved in 8-10 weeks). -- PARTIALLY MET     Patient will complete writing tasks at the sentence level with 80% accuracy to increase writing skills within FLE (to be achieved in 8-10 weeks). -- PARTIALLY MET      NEW GOAL:  Patient will complete BDAE (short form assessment--[place] Naming Test & Narrative Writing) testing for further development of POC/goals (to be achieved in 1-2 weeks).   -Completed additional standardized assessment during  diagnostic therapy session (BNT).    The [place] Naming Test-Second Edition (BNT-2) is a confrontational naming assessment that asks patients to provide the best name for a given picture. It was designed to detect word-finding impairments. The BNT-2 consists of 60 pictures, ordered from easiest to most difficult. Stimulus cues, including semantic, phonemic, and written information, are provided as necessary.      The following results were obtained during the administration of the assessment:     Summary of Scores: Score:   1. Number of spontaneously given correct response: 20       2. Number of stimulus cues given:  40   3. Number of correct responses following a stimulus cue:  0       *TOTAL SCORE: 20   Percentile Rank:  30%ile       Additional Cuein. Number of phonemic cues: 40   5. Number of correct responses following the phonemic cue: 11       6. Number of multiple choices given:  DNT   7. Number of correct choices: DNT     DNT = \"did not assess\" due to time constraints    *It should be noted that patient attempt to self-cue during moments of anomia. Patient utilized circumlocution, including description of the pictured objects, their function, etc. Patient also implemented gesturing. More \"general\" terms were noted (e.g., \"tree\" " "for cactus, \"bird\" for pelican, and \"boat\" for canoe). Phonemic cues presented by clinician were only partially-effective. Due to time constraints, multiple choice cues were unable to be presented on this date of service.    Plan:  -Patient was provided with home exercises/activities to target goals in plan of care at the end of today's session.  -Continue with current plan of care.  "

## 2024-08-22 ENCOUNTER — APPOINTMENT (OUTPATIENT)
Dept: SPEECH THERAPY | Facility: CLINIC | Age: 49
End: 2024-08-22
Payer: COMMERCIAL

## 2024-08-22 ENCOUNTER — TELEPHONE (OUTPATIENT)
Dept: SPEECH THERAPY | Facility: CLINIC | Age: 49
End: 2024-08-22

## 2024-08-22 NOTE — TELEPHONE ENCOUNTER
Contacted patient and left a voicemail to discuss how he will be moving forward with therapy, MA pending was closed & no other insurance on file. Looking to see if patient will be self pay.

## 2024-08-26 ENCOUNTER — APPOINTMENT (OUTPATIENT)
Dept: SPEECH THERAPY | Facility: CLINIC | Age: 49
End: 2024-08-26
Payer: COMMERCIAL

## 2024-08-26 DIAGNOSIS — I69.920 APHASIA, LATE EFFECT OF CEREBROVASCULAR DISEASE: Primary | ICD-10-CM

## 2024-08-26 DIAGNOSIS — I63.81 CEREBROVASCULAR ACCIDENT (CVA) OF LEFT BASAL GANGLIA (HCC): ICD-10-CM

## 2024-08-27 NOTE — TELEPHONE ENCOUNTER
MSW phoned the [facility] [employer] office at [phone] and spoke with Cyndy. She stated that patient's [facility] Financial Assistance application has been approved until 12/22/24. Cyndy stated that if patient needs to reapply, that an application can be requested 2 weeks before his term date.     MSW phoned patient's sister, Meli, at [phone] to make her aware of above. Meli was already aware of the [facility] Financial Assistance approval. Meli made aware that patient will need to reapply if he needs continued assistance beyond 12/22/24. Meli stated that she has the phone number for the [employer] number and will contact them to request another application if/when needed.    MSW did inquire if the hearing for continued MA had taken place yet. Meli stated that the hearing was last week and that his coverage was not extended. Meli stated that they were told that patient can reapply if he requires any emergency care in the future.     MSW will close task as this time as needs have been addressed. The Social Work Team will be available to patient as needed.

## 2024-08-28 ENCOUNTER — OFFICE VISIT (OUTPATIENT)
Dept: SPEECH THERAPY | Facility: CLINIC | Age: 49
End: 2024-08-28
Payer: COMMERCIAL

## 2024-08-28 DIAGNOSIS — I69.920 APHASIA, LATE EFFECT OF CEREBROVASCULAR DISEASE: Primary | ICD-10-CM

## 2024-08-28 DIAGNOSIS — I63.81 CEREBROVASCULAR ACCIDENT (CVA) OF LEFT BASAL GANGLIA (HCC): ICD-10-CM

## 2024-08-28 PROCEDURE — 92507 TX SP LANG VOICE COMM INDIV: CPT | Performed by: SPEECH-LANGUAGE PATHOLOGIST

## 2024-08-28 NOTE — PROGRESS NOTES
Daily Speech Treatment Note    Today's date: 2024   Patient’s name: Galdino Gaines  : 1975  MRN: [id number]  Safety measures: h/o CVA  Referring provider: Evy Alvarez MD    Encounter Diagnosis     ICD-10-CM    1. Aphasia, late effect of cerebrovascular disease  I69.920       2. Cerebrovascular accident (CVA) of left basal ganglia (HCC)  I63.81         Visit Tracking:  Auth Status Date 24             MA PENDING Used                Remaining BOMN BOMN BOMN BOMN  BOMN BOMN BOMN BOMN  BOMN  BOMN         Subjective/Behavioral:  - Patient was in good spirits. He stated that his insurance/assistance had gotten straightened out.     Objective/Assessment:  -Reviewed patient's home exercises/activities completed since last appointment. Analogies homework was reviewed. Task completed with 100% accuracy, requiring min cueing to complete missing answers.    Short-term goals:  Patient will complete concrete and abstract categorization tasks to 90% accuracy to facilitate improved generative naming skills (to be achieved in 8-10 weeks). -- PARTIALLY MET     Patient will complete word generation tasks (e.g., verbal fluency, categorization, analogies, synonyms/antonyms, etc.) with 80% accuracy using word finding strategies to facilitate improved word retrieval skills (to be achieved in 8-10 weeks). -- PARTIALLY MET     Patient will name up to 5 features to describe a person/place/thing with 80% accuracy to facilitate improved utilization of circumlocution strategy (to be achieved in 8-10 weeks). -- PARTIALLY MET     Patient will complete picture description tasks using language organization strategies with 80% accuracy to facilitate improved utilization of circumlocution strategy (to be achieved in 8-10 weeks). -- PARTIALLY MET  Patient was provided with a series of picture pairs (e.g. sugar and salt). He was tasked to provide 2 ways that they are similar  and 2 ways that they are different. Task completed with 90% accuracy provided min-mod cueing.      Patient will answer complex verbal yes/no questions with 80% accuracy to facilitate increased auditory comprehension skills (to be achieved in 8-10 weeks). -- PARTIALLY MET     Patient will follow multistep verbal directions with 80% accuracy to facilitate increased auditory comprehension skills (to be achieved in 8-10 weeks). -- PARTIALLY MET     Patient will complete writing tasks at the sentence level with 80% accuracy to increase writing skills within FLE (to be achieved in 8-10 weeks). -- PARTIALLY MET     NEW GOAL:  Patient will complete BDAE (short form assessment--[place] Naming Test & Narrative Writing) testing for further development of POC/goals (to be achieved in 1-2 weeks).     Plan:  -Patient was provided with home exercises/activities to target goals in plan of care at the end of today's session.  -Continue with current plan of care.

## 2024-08-29 ENCOUNTER — APPOINTMENT (OUTPATIENT)
Dept: SPEECH THERAPY | Facility: CLINIC | Age: 49
End: 2024-08-29
Payer: COMMERCIAL

## 2024-09-03 NOTE — PROGRESS NOTES
Daily Speech Treatment Note    Today's date: 2024  Patient’s name: Galdino Gaines  : 1975  MRN: [id number]  Safety measures: h/o CVA  Referring provider: Evy Alvarez MD    Encounter Diagnosis     ICD-10-CM    1. Aphasia, late effect of cerebrovascular disease  I69.920       2. Cerebrovascular accident (CVA) of left basal ganglia (HCC)  I63.81         Visit Tracking:  POC   Expires Auth Expiration Date ST Visit Limit   10/12/2024 PENDING PENDING              Visit/Unit Tracking:  Auth Status Date 24            MA PENDING Used  22 23 24 25  26              Remaining BOMN BOMN BOMN BOMN  BOMN BOMN BOMN BOMN  BOMN  BOMN        Subjective/Behavioral:  -Patient notified clinician that he was informed that his insurance plan will be covering OP ST services.    Objective/Assessment:  -Reviewed patient's home exercises/activities completed since last appointment. Deduction by exclusion completed with 90% acc.     Short-term goals:  Patient will complete concrete and abstract categorization tasks to 90% accuracy to facilitate improved generative naming skills (to be achieved in 8-10 weeks). -- PARTIALLY MET     Patient will complete word generation tasks (e.g., verbal fluency, categorization, analogies, synonyms/antonyms, etc.) with 80% accuracy using word finding strategies to facilitate improved word retrieval skills (to be achieved in 8-10 weeks). -- PARTIALLY MET  -Deducing compound words (unable to complete independently for HEP):  Patient was presented with two definitions and was asked to solve (e.g., “a yellow vegetable + pieces of snow = a cereal” - [employer]). Task completed in 2/15 opp (13% acc) independently, increasing to 13/15 opp (87% acc) with mod semantic cues.    Patient will name up to 5 features to describe a person/place/thing with 80% accuracy to facilitate improved utilization of circumlocution strategy (to be achieved in 8-10  weeks). -- PARTIALLY MET     Patient will complete picture description tasks using language organization strategies with 80% accuracy to facilitate improved utilization of circumlocution strategy (to be achieved in 8-10 weeks). -- PARTIALLY MET     Patient will answer complex verbal yes/no questions with 80% accuracy to facilitate increased auditory comprehension skills (to be achieved in 8-10 weeks). -- PARTIALLY MET     Patient will follow multistep verbal directions with 80% accuracy to facilitate increased auditory comprehension skills (to be achieved in 8-10 weeks). -- PARTIALLY MET     Patient will complete writing tasks at the sentence level with 80% accuracy to increase writing skills within FLE (to be achieved in 8-10 weeks). -- PARTIALLY MET     NEW GOAL:  Patient will complete BDAE (short form assessment--[place] Naming Test & Narrative Writing) testing for further development of POC/goals (to be achieved in 1-2 weeks).     Plan:  -Patient was provided with home exercises/activities to target goals in plan of care at the end of today's session.  -Continue with current plan of care.

## 2024-09-03 NOTE — PROGRESS NOTES
Daily Speech Treatment Note    Today's date: 2024  Patient’s name: Galdino Gaines  : 1975  MRN: [id number]  Safety measures: h/o CVA  Referring provider: Evy Alvarez MD    Encounter Diagnosis     ICD-10-CM    1. Aphasia, late effect of cerebrovascular disease  I69.920       2. Cerebrovascular accident (CVA) of left basal ganglia (HCC)  I63.81           Visit Tracking:  POC   Expires Auth Expiration Date ST Visit Limit   10/12/2024 PENDING PENDING              Visit/Unit Tracking:  Auth Status Date 24       MA PENDING Used  22 23 24 25  26  27 28         Remaining BOMN BOMN BOMN BOMN  BOMN BOMN BOMN BOMN  BOMN  BOMN      Subjective/Behavioral:  -Patient reported that he was feeling well today.    Objective/Assessment:  -Reviewed patient's home exercises/activities completed since last appointment. Word association task completed with 93% acc.     Short-term goals:  Patient will complete concrete and abstract categorization tasks to 90% accuracy to facilitate improved generative naming skills (to be achieved in 8-10 weeks). -- PARTIALLY MET  -Generative naming: Patient instructed to name 5 words for a given category.   *[place] categories: Task completed in 13/15 opp (87% acc) independently, increasing to 14/15 opp (93% acc) with min semantic cues.    *Abstract categories: Task completed in 13/15 opp (87% acc) independently, increasing to 15/15 opp (100% acc) with min semantic cues.    *Letter categories: Task completed in 11/15 opp (73% acc) independently, increasing to 15/15 opp (100% acc) with min-mod cues (patient benefited from using categories to assist with word retrieve).     Patient will complete word generation tasks (e.g., verbal fluency, categorization, analogies, synonyms/antonyms, etc.) with 80% accuracy using word finding strategies to facilitate improved word retrieval skills (to be achieved in 8-10 weeks). --  "PARTIALLY MET     Patient will name up to 5 features to describe a person/place/thing with 80% accuracy to facilitate improved utilization of circumlocution strategy (to be achieved in 8-10 weeks). -- PARTIALLY MET    Patient will complete picture description tasks using language organization strategies with 80% accuracy to facilitate improved utilization of circumlocution strategy (to be achieved in 8-10 weeks). -- PARTIALLY MET  -Structured conversation: Patient was presented with a question from the [employer] Card \"Word\" deck to introduce task today and was asked to supply an answer. Patient completed 4 cards today with min verbal cues to expand upon answers. Recommend continued practice next session.     Patient will answer complex verbal yes/no questions with 80% accuracy to facilitate increased auditory comprehension skills (to be achieved in 8-10 weeks). -- PARTIALLY MET     Patient will follow multistep verbal directions with 80% accuracy to facilitate increased auditory comprehension skills (to be achieved in 8-10 weeks). -- PARTIALLY MET     Patient will complete writing tasks at the sentence level with 80% accuracy to increase writing skills within FLE (to be achieved in 8-10 weeks). -- PARTIALLY MET     NEW GOAL:  Patient will complete BDAE (short form assessment--[place] Naming Test & Narrative Writing) testing for further development of POC/goals (to be achieved in 1-2 weeks).     Plan:  -Patient was provided with home exercises/activities to target goals in plan of care at the end of today's session.  -Continue with current plan of care.  "

## 2024-09-03 NOTE — PROGRESS NOTES
Daily Speech Treatment Note    Today's date: 2024  Patient’s name: Galdino Gaines  : 1975  MRN: [id number]  Safety measures: h/o CVA  Referring provider: Evy Alvarez MD    Encounter Diagnosis     ICD-10-CM    1. Aphasia, late effect of cerebrovascular disease  I69.920       2. Cerebrovascular accident (CVA) of left basal ganglia (HCC)  I63.81         Visit Tracking:  POC   Expires Auth Expiration Date ST Visit Limit   10/12/2024 PENDING PENDING              Visit/Unit Tracking:  Auth Status Date 24         MA PENDING Used  22 23 24 25  26  27           Remaining BOMN BOMN BOMN BOMN  BOMN BOMN BOMN BOMN  BOMN  BOMN     Subjective/Behavioral:  -Patient's transportation brought him late to today's appointment.    Objective/Assessment:  -Reviewed patient's home exercises/activities completed since last appointment. Patient completed some worksheets with assistance from the internet per patient report.    Short-term goals:  Patient will complete concrete and abstract categorization tasks to 90% accuracy to facilitate improved generative naming skills (to be achieved in 8-10 weeks). -- PARTIALLY MET     Patient will complete word generation tasks (e.g., verbal fluency, categorization, analogies, synonyms/antonyms, etc.) with 80% accuracy using word finding strategies to facilitate improved word retrieval skills (to be achieved in 8-10 weeks). -- PARTIALLY MET  -Deducing compound words:  Patient was presented with two definitions and was asked to solve (e.g., “a yellow vegetable + pieces of snow = a cereal” - [employer]). Task completed in 3/15 opp (20% acc) independently, increasing to 13/15 opp (87% acc) with mod semantic cues. Some vocabulary terms patient was unfamiliar with; therefore, clinician adapted the clues.    Patient will name up to 5 features to describe a person/place/thing with 80% accuracy to facilitate improved utilization  "of circumlocution strategy (to be achieved in 8-10 weeks). -- PARTIALLY MET  -Word association: Patient was provided with a single word and asked to name 10 words associated with it. Task completed across 2 trials provided mod-max semantic cues from clinician. Patient was noted to provide clinician with general terms (e.g., \"food\") and benefited from cues to increase specification (e.g., soup).     Patient will complete picture description tasks using language organization strategies with 80% accuracy to facilitate improved utilization of circumlocution strategy (to be achieved in 8-10 weeks). -- PARTIALLY MET     Patient will answer complex verbal yes/no questions with 80% accuracy to facilitate increased auditory comprehension skills (to be achieved in 8-10 weeks). -- PARTIALLY MET     Patient will follow multistep verbal directions with 80% accuracy to facilitate increased auditory comprehension skills (to be achieved in 8-10 weeks). -- PARTIALLY MET     Patient will complete writing tasks at the sentence level with 80% accuracy to increase writing skills within FLE (to be achieved in 8-10 weeks). -- PARTIALLY MET     NEW GOAL:  Patient will complete BDAE (short form assessment--[place] Naming Test & Narrative Writing) testing for further development of POC/goals (to be achieved in 1-2 weeks).     Plan:  -Patient was provided with home exercises/activities to target goals in plan of care at the end of today's session.  -Continue with current plan of care.  "

## 2024-09-04 ENCOUNTER — OFFICE VISIT (OUTPATIENT)
Dept: SPEECH THERAPY | Facility: CLINIC | Age: 49
End: 2024-09-04
Payer: COMMERCIAL

## 2024-09-04 DIAGNOSIS — I69.920 APHASIA, LATE EFFECT OF CEREBROVASCULAR DISEASE: Primary | ICD-10-CM

## 2024-09-04 DIAGNOSIS — I63.81 CEREBROVASCULAR ACCIDENT (CVA) OF LEFT BASAL GANGLIA (HCC): ICD-10-CM

## 2024-09-04 PROCEDURE — 92507 TX SP LANG VOICE COMM INDIV: CPT | Performed by: SPEECH-LANGUAGE PATHOLOGIST

## 2024-09-09 ENCOUNTER — OFFICE VISIT (OUTPATIENT)
Dept: SPEECH THERAPY | Facility: CLINIC | Age: 49
End: 2024-09-09
Payer: COMMERCIAL

## 2024-09-09 DIAGNOSIS — I69.920 APHASIA, LATE EFFECT OF CEREBROVASCULAR DISEASE: Primary | ICD-10-CM

## 2024-09-09 DIAGNOSIS — I63.81 CEREBROVASCULAR ACCIDENT (CVA) OF LEFT BASAL GANGLIA (HCC): ICD-10-CM

## 2024-09-09 PROCEDURE — 92507 TX SP LANG VOICE COMM INDIV: CPT | Performed by: SPEECH-LANGUAGE PATHOLOGIST

## 2024-09-09 NOTE — PROGRESS NOTES
"Daily Speech Treatment Note    Today's date: 2024  Patient’s name: Galdino Gaines  : 1975  MRN: [id number]  Safety measures: h/o CVA  Referring provider: Evy Alvarez MD    Encounter Diagnosis     ICD-10-CM    1. Aphasia, late effect of cerebrovascular disease  I69.920       2. Cerebrovascular accident (CVA) of left basal ganglia (HCC)  I63.81         Visit Tracking:  POC   Expires Auth Expiration Date ST Visit Limit   10/12/2024 PENDING PENDING              Visit/Unit Tracking:  Auth Status Date 24     MA PENDING Used  22 23 24 25  26  27 28 29       Remaining BOMN BOMN BOMN BOMN BOMN BOMN BOMN BOMN BOMN BOMN     Subjective/Behavioral:  -Patient was motivated to begin therapy.    Objective/Assessment:  -Reviewed patient's home exercises/activities completed since last appointment. Generative naming completed in 43/45 opp.    Short-term goals:  Patient will complete concrete and abstract categorization tasks to 90% accuracy to facilitate improved generative naming skills (to be achieved in 8-10 weeks). -- PARTIALLY MET     Patient will complete word generation tasks (e.g., verbal fluency, categorization, analogies, synonyms/antonyms, etc.) with 80% accuracy using word finding strategies to facilitate improved word retrieval skills (to be achieved in 8-10 weeks). -- PARTIALLY MET     Patient will name up to 5 features to describe a person/place/thing with 80% accuracy to facilitate improved utilization of circumlocution strategy (to be achieved in 8-10 weeks). -- PARTIALLY MET     Patient will complete picture description tasks using language organization strategies with 80% accuracy to facilitate improved utilization of circumlocution strategy (to be achieved in 8-10 weeks). -- PARTIALLY MET  -Structured conversation: Patient was presented with questions from the [employer] Card \"Word\" deck and was asked " to supply an answer. Patient completed 9 cards today with min-mod verbal cues to expand upon answers. Patient benefited from visual cues (e.g., pictures from [employer]) to aide with communication success.     Patient will answer complex verbal yes/no questions with 80% accuracy to facilitate increased auditory comprehension skills (to be achieved in 8-10 weeks). -- PARTIALLY MET  -Patient continues with at least mild impairments with comprehension of stimuli presented auditorily and benefits from verbal repetition and rephrasing to increase communication success.     Patient will follow multistep verbal directions with 80% accuracy to facilitate increased auditory comprehension skills (to be achieved in 8-10 weeks). -- PARTIALLY MET     Patient will complete writing tasks at the sentence level with 80% accuracy to increase writing skills within FLE (to be achieved in 8-10 weeks). -- PARTIALLY MET     NEW GOAL:  Patient will complete BDAE (short form assessment--[place] Naming Test & Narrative Writing) testing for further development of POC/goals (to be achieved in 1-2 weeks).     Plan:  -Patient was provided with home exercises/activities to target goals in plan of care at the end of today's session.  -Continue with current plan of care.

## 2024-09-09 NOTE — PROGRESS NOTES
Daily Speech Treatment Note    Today's date: 2024  Patient’s name: Galdino Gaines  : 1975  MRN: [id number]  Safety measures: h/o CVA  Referring provider: Evy Alvarez MD    Encounter Diagnosis     ICD-10-CM    1. Aphasia, late effect of cerebrovascular disease  I69.920       2. Cerebrovascular accident (CVA) of left basal ganglia (HCC)  I63.81         Visit Tracking:  POC   Expires Auth Expiration Date ST Visit Limit   10/12/2024 PENDING PENDING              Visit/Unit Tracking:  Auth Status Date 24   MA PENDING Used  22 23 24 25  26  27 28 29 30     Remaining BOMN BOMN BOMN BOMN BOMN BOMN BOMN BOMN BOMN BOMN     Subjective/Behavioral:  -Patient reported that he met with Cardiology before today's OP ST appointment.     Objective/Assessment:  -Reviewed patient's home exercises/activities completed since last appointment. Generative naming completed with 96% acc.      Short-term goals:  Patient will complete concrete and abstract categorization tasks to 90% accuracy to facilitate improved generative naming skills (to be achieved in 8-10 weeks). -- PARTIALLY MET  -Generative naming: Patient instructed to name 5 words for a given category.   *[place] categories: Task completed in 15/15 opp (100% acc) independently.   *Abstract categories: Task completed in 10/15 opp (67% acc) independently, increasing to 15/15 opp (100% acc) with min semantic cues.    *Letter categories: Task completed in 15/15 opp (100% acc) independently.    Patient with quicker processing speed during exercise today.      Patient will complete word generation tasks (e.g., verbal fluency, categorization, analogies, synonyms/antonyms, etc.) with 80% accuracy using word finding strategies to facilitate improved word retrieval skills (to be achieved in 8-10 weeks). -- PARTIALLY MET     Patient will name up to 5 features to describe a  person/place/thing with 80% accuracy to facilitate improved utilization of circumlocution strategy (to be achieved in 8-10 weeks). -- PARTIALLY MET     Patient will complete picture description tasks using language organization strategies with 80% accuracy to facilitate improved utilization of circumlocution strategy (to be achieved in 8-10 weeks). -- PARTIALLY MET     Patient will answer complex verbal yes/no questions with 80% accuracy to facilitate increased auditory comprehension skills (to be achieved in 8-10 weeks). -- PARTIALLY MET  -Auditory comprehension/processing (yes/no questions): Patient was presented with comparison questions aloud (e.g., “Is a feather LIGHTER than a bird?” - YES) and asked to solve. Task completed in 23/30 opp (77% acc), increasing to 29/30 opp (97% acc) with min-mod verbal repetition cues. Patient benefited from repeating clinician's statements aloud when demonstrating a breakdown in comprehension.     Patient will follow multistep verbal directions with 80% accuracy to facilitate increased auditory comprehension skills (to be achieved in 8-10 weeks). -- PARTIALLY MET     Patient will complete writing tasks at the sentence level with 80% accuracy to increase writing skills within FLE (to be achieved in 8-10 weeks). -- PARTIALLY MET     NEW GOAL:  Patient will complete BDAE (short form assessment--[place] Naming Test & Narrative Writing) testing for further development of POC/goals (to be achieved in 1-2 weeks).     Plan:  -Patient was provided with home exercises/activities to target goals in plan of care at the end of today's session.  -Continue with current plan of care.

## 2024-09-11 ENCOUNTER — OFFICE VISIT (OUTPATIENT)
Dept: SPEECH THERAPY | Facility: CLINIC | Age: 49
End: 2024-09-11
Payer: COMMERCIAL

## 2024-09-11 DIAGNOSIS — I69.920 APHASIA, LATE EFFECT OF CEREBROVASCULAR DISEASE: Primary | ICD-10-CM

## 2024-09-11 DIAGNOSIS — I63.81 CEREBROVASCULAR ACCIDENT (CVA) OF LEFT BASAL GANGLIA (HCC): ICD-10-CM

## 2024-09-11 PROCEDURE — 92507 TX SP LANG VOICE COMM INDIV: CPT | Performed by: SPEECH-LANGUAGE PATHOLOGIST

## 2024-09-16 ENCOUNTER — OFFICE VISIT (OUTPATIENT)
Dept: SPEECH THERAPY | Facility: CLINIC | Age: 49
End: 2024-09-16
Payer: COMMERCIAL

## 2024-09-16 DIAGNOSIS — I63.81 CEREBROVASCULAR ACCIDENT (CVA) OF LEFT BASAL GANGLIA (HCC): ICD-10-CM

## 2024-09-16 DIAGNOSIS — I69.920 APHASIA, LATE EFFECT OF CEREBROVASCULAR DISEASE: Primary | ICD-10-CM

## 2024-09-16 PROCEDURE — 92507 TX SP LANG VOICE COMM INDIV: CPT | Performed by: SPEECH-LANGUAGE PATHOLOGIST

## 2024-09-16 NOTE — PROGRESS NOTES
Daily Speech Treatment Note    Today's date: 9/15/2024 ***  Patient’s name: Galdino Gaines  : 1975  MRN: [id number]  Safety measures: h/o CVA  Referring provider: Evy Alvarez MD    Encounter Diagnosis     ICD-10-CM    1. Aphasia, late effect of cerebrovascular disease  I69.920       2. Cerebrovascular accident (CVA) of left basal ganglia (HCC)  I63.81           Visit Tracking:  ***    Subjective/Behavioral:  -***    Objective/Assessment:  -Reviewed patient's home exercises/activities completed since last appointment. ***    Short-term goals:  Patient will complete concrete and abstract categorization tasks to 90% accuracy to facilitate improved generative naming skills (to be achieved in 8-10 weeks). -- PARTIALLY MET     Patient will complete word generation tasks (e.g., verbal fluency, categorization, analogies, synonyms/antonyms, etc.) with 80% accuracy using word finding strategies to facilitate improved word retrieval skills (to be achieved in 8-10 weeks). -- PARTIALLY MET     Patient will name up to 5 features to describe a person/place/thing with 80% accuracy to facilitate improved utilization of circumlocution strategy (to be achieved in 8-10 weeks). -- PARTIALLY MET     Patient will complete picture description tasks using language organization strategies with 80% accuracy to facilitate improved utilization of circumlocution strategy (to be achieved in 8-10 weeks). -- PARTIALLY MET     Patient will answer complex verbal yes/no questions with 80% accuracy to facilitate increased auditory comprehension skills (to be achieved in 8-10 weeks). -- PARTIALLY MET     Patient will follow multistep verbal directions with 80% accuracy to facilitate increased auditory comprehension skills (to be achieved in 8-10 weeks). -- PARTIALLY MET     Patient will complete writing tasks at the sentence level with 80% accuracy to increase writing skills within FLE (to be achieved in 8-10 weeks). -- PARTIALLY MET      NEW GOAL:  Patient will complete BDAE (short form assessment--[place] Naming Test & Narrative Writing) testing for further development of POC/goals (to be achieved in 1-2 weeks).     Plan:  -Patient was provided with home exercises/activities to target goals in plan of care at the end of today's session.  -Continue with current plan of care.

## 2024-09-16 NOTE — PROGRESS NOTES
Daily Speech Treatment Note    Today's date: 2024  Patient’s name: Galdino Gaines  : 1975  MRN: [id number]  Safety measures: h/o CVA  Referring provider: Evy Alvarez MD    Encounter Diagnosis     ICD-10-CM    1. Aphasia, late effect of cerebrovascular disease  I69.920       2. Cerebrovascular accident (CVA) of left basal ganglia (HCC)  I63.81         Visit Tracking:  POC   Expires Auth Expiration Date ST Visit Limit   10/12/2024 PENDING PENDING              Visit/Unit Tracking:  Auth Status Date 24   MA PENDING Used  23 24 25  26  27 28 29 30 31     Remaining BOMN BOMN BOMN BOMN BOMN BOMN BOMN BOMN BOMN BOMN BOMN     Subjective/Behavioral:  -Patient with no concerns today.    Objective/Assessment:  -Reviewed patient's home exercises/activities completed since last appointment. Answering yes/no and comparison questions with approx. 90% acc.    Short-term goals:  Patient will complete concrete and abstract categorization tasks to 90% accuracy to facilitate improved generative naming skills (to be achieved in 8-10 weeks). -- PARTIALLY MET     Patient will complete word generation tasks (e.g., verbal fluency, categorization, analogies, synonyms/antonyms, etc.) with 80% accuracy using word finding strategies to facilitate improved word retrieval skills (to be achieved in 8-10 weeks). -- PARTIALLY MET     Patient will name up to 5 features to describe a person/place/thing with 80% accuracy to facilitate improved utilization of circumlocution strategy (to be achieved in 8-10 weeks). -- PARTIALLY MET     Patient will complete picture description tasks using language organization strategies with 80% accuracy to facilitate improved utilization of circumlocution strategy (to be achieved in 8-10 weeks). -- PARTIALLY MET     Patient will answer complex verbal yes/no questions with 80% accuracy to facilitate  increased auditory comprehension skills (to be achieved in 8-10 weeks). -- PARTIALLY MET     Patient will follow multistep verbal directions with 80% accuracy to facilitate increased auditory comprehension skills (to be achieved in 8-10 weeks). -- PARTIALLY MET  -Auditory comprehension/processing (2-step, four component directions): Patient listened to multistep directions read aloud and asked to utilize information to make markings on a worksheet. Task completed in 4/10 opp (40% acc) independently, increasing to 7/10 opp (70% acc) with min-mod verbal repetition cues. Patient benefited from repeating directions aloud to facilitate increased comprehension and processing.     -Auditory comprehension/processing (pictures with directions): Patient listened to 2-step directions read aloud and asked to utilize information to make markings on a worksheet. Task completed in 2/8 opp (25% acc) independently, increasing to 8/8 opp (100% acc) with mod verbal repetition cues. Patient benefited from directions being broken down into simpler parts.     Patient will complete writing tasks at the sentence level with 80% accuracy to increase writing skills within FLE (to be achieved in 8-10 weeks). -- PARTIALLY MET     NEW GOAL:  Patient will complete BDAE (short form assessment--[place] Naming Test & Narrative Writing) testing for further development of POC/goals (to be achieved in 1-2 weeks).       -Patient asked clinician how much longer he had left in OP ST services. Clinician explained to patient that it varies from one patient to the next. When prompted by clinician, patient rated himself to be functioning from a communication standpoint at 80% (English--secondary language, fluent prior to CVA) and at 90% ([place]--primary language). Clinician encouraged patient to discuss his communication abilities with his sister and return to therapy next session with a report how she feels he is performing. Patient wishes to continue working on  "his speech-language skills into the month of October. Patient indicated that he has \"become slow\" with his speaking. Clinician notes that expressive/receptive language deficits are still present s/p CVA.     Plan:  -Patient was provided with home exercises/activities to target goals in plan of care at the end of today's session.  -Continue with current plan of care.  "

## 2024-09-18 ENCOUNTER — OFFICE VISIT (OUTPATIENT)
Dept: SPEECH THERAPY | Facility: CLINIC | Age: 49
End: 2024-09-18
Payer: COMMERCIAL

## 2024-09-18 ENCOUNTER — OFFICE VISIT (OUTPATIENT)
Dept: CARDIOLOGY CLINIC | Facility: CLINIC | Age: 49
End: 2024-09-18

## 2024-09-18 VITALS
SYSTOLIC BLOOD PRESSURE: 126 MMHG | WEIGHT: 164.2 LBS | OXYGEN SATURATION: 99 % | DIASTOLIC BLOOD PRESSURE: 84 MMHG | BODY MASS INDEX: 26.39 KG/M2 | HEART RATE: 68 BPM | HEIGHT: 66 IN

## 2024-09-18 DIAGNOSIS — I63.81 CEREBROVASCULAR ACCIDENT (CVA) OF LEFT BASAL GANGLIA (HCC): Primary | ICD-10-CM

## 2024-09-18 DIAGNOSIS — I63.81 CEREBROVASCULAR ACCIDENT (CVA) OF LEFT BASAL GANGLIA (HCC): ICD-10-CM

## 2024-09-18 DIAGNOSIS — I69.920 APHASIA, LATE EFFECT OF CEREBROVASCULAR DISEASE: Primary | ICD-10-CM

## 2024-09-18 DIAGNOSIS — E78.00 HYPERCHOLESTEROLEMIA: ICD-10-CM

## 2024-09-18 DIAGNOSIS — I10 PRIMARY HYPERTENSION: ICD-10-CM

## 2024-09-18 PROCEDURE — 92507 TX SP LANG VOICE COMM INDIV: CPT | Performed by: SPEECH-LANGUAGE PATHOLOGIST

## 2024-09-18 PROCEDURE — 99214 OFFICE O/P EST MOD 30 MIN: CPT | Performed by: INTERNAL MEDICINE

## 2024-09-18 NOTE — PROGRESS NOTES
[facility] Cardiology  Follow up note  Galdino Gaines 49 y.o. 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  Primary hypertension  Under reasonable control today, but he reports some occasional elevated home blood pressures, he resumed a home medication that we are not aware of.    Plan:    We will call him this afternoon to clarify his home antihypertensive  Office blood pressure is okay, home blood pressures are mildly elevated  May need some titration  We will plan follow-up lipids and a Zio patch, he has established insurance at this point.      HPI:   Galdino Gaines is a 49 y.o. year old male who was hospitalized 5/24, with hypertension for which she was on medical therapy, unfortunately with a basal ganglia CVA, completely normal CTA imaging of the head and neck, a normal transesophageal echo except for an accessory chordal structure attached from the LVOT to the papillary muscle but with no thrombus or any other signs of mass.  He was placed on aspirin.  He has slowly recovered and is fairly conversive today.  His blood pressure is under reasonable control, but he has started taking an old home medication for hypertension, stating that his home blood pressures were running 140s to 150s on occasion he still gets 140s systolic.  He was placed on atorvastatin as well, his LDL at diagnosis was 205.  He was without insurance and did not undergo a Zio patch, at this point he has established insurance.      Review of Systems   Constitutional:  Negative for appetite change, diaphoresis, fatigue and fever.   Respiratory:  Negative for chest tightness, shortness of breath and  wheezing.    Cardiovascular:  Positive for chest pain. Negative for palpitations and leg swelling.   Gastrointestinal:  Negative for abdominal pain and blood in stool.   Musculoskeletal:  Negative for arthralgias and joint swelling.   Skin:  Negative for rash.   Neurological:  Negative for dizziness, syncope and light-headedness.       Past Medical History:   Diagnosis Date    Hypertension     Stroke (HCC)      Social History     Substance and Sexual Activity   Alcohol Use Never     Social History     Substance and Sexual Activity   Drug Use Never     Social History     Tobacco Use   Smoking Status Never    Passive exposure: Never   Smokeless Tobacco Never       Allergies:  No Known Allergies    Medications:     Current Outpatient Medications:     aspirin (ECOTRIN LOW STRENGTH) 81 mg EC tablet, Take 1 tablet (81 mg total) by mouth daily, Disp: 120 tablet, Rfl: 3    atorvastatin (LIPITOR) 80 mg tablet, Take 1 tablet (80 mg total) by mouth every evening, Disp: 90 tablet, Rfl: 3    chlorproMAZINE (THORAZINE) 25 mg tablet, Take 1 tablet (25 mg total) by mouth 3 (three) times a day as needed (hiccups), Disp: 60 tablet, Rfl: 1      Vitals:    09/18/24 1259   BP: 126/84   Pulse: 68   SpO2: 99%     Weight (last 2 days)       Date/Time Weight    09/18/24 1259 74.5 (164.2)          Physical Exam  Constitutional:       General: He is not in acute distress.     Appearance: He is not diaphoretic.   HENT:      Head: Normocephalic and atraumatic.   Eyes:      General: No scleral icterus.     Conjunctiva/sclera: Conjunctivae normal.   Neck:      Vascular: No JVD.   Cardiovascular:      Rate and Rhythm: Normal rate and regular rhythm.      Heart sounds: Normal heart sounds. No murmur heard.  Pulmonary:      Effort: Pulmonary effort is normal. No respiratory distress.      Breath sounds: Normal breath sounds. No decreased breath sounds, wheezing, rhonchi or rales.   Musculoskeletal:      Cervical back: Normal range of motion.       "Right lower leg: Normal. No edema.      Left lower leg: Normal. No edema.   Skin:     General: Skin is warm and dry.   Neurological:      Mental Status: He is alert and oriented to person, place, and time.         Laboratory Studies:    Laboratory studies personally reviewed    Cardiac testing:     EKG reviewed personally:   No results found for this visit on 09/18/24.      Echocardiogram:      Stress tests:      Catheterization:      Holter:         Brendon Healy MD    Portions of the record may have been created with voice recognition software.  Occasional wrong word or \"sound a like\" substitutions may have occurred due to the inherent limitations of voice recognition software.  Read the chart carefully and recognize, using context, where substitutions have occurred.  "

## 2024-09-18 NOTE — ASSESSMENT & PLAN NOTE
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

## 2024-09-18 NOTE — ASSESSMENT & PLAN NOTE
Under reasonable control today, but he reports some occasional elevated home blood pressures, he resumed a home medication that we are not aware of.

## 2024-09-19 ENCOUNTER — APPOINTMENT (OUTPATIENT)
Dept: LAB | Facility: CLINIC | Age: 49
End: 2024-09-19
Payer: COMMERCIAL

## 2024-09-19 DIAGNOSIS — Z11.59 ENCOUNTER FOR HEPATITIS C SCREENING TEST FOR LOW RISK PATIENT: ICD-10-CM

## 2024-09-19 DIAGNOSIS — E78.5 HYPERLIPIDEMIA, UNSPECIFIED HYPERLIPIDEMIA TYPE: ICD-10-CM

## 2024-09-19 DIAGNOSIS — I63.81 CEREBROVASCULAR ACCIDENT (CVA) OF LEFT BASAL GANGLIA (HCC): ICD-10-CM

## 2024-09-19 LAB
CHOLEST SERPL-MCNC: 156 MG/DL
HCV AB SER QL: NORMAL
HDLC SERPL-MCNC: 54 MG/DL
LDLC SERPL CALC-MCNC: 88 MG/DL (ref 0–100)
NONHDLC SERPL-MCNC: 102 MG/DL
TRIGL SERPL-MCNC: 69 MG/DL

## 2024-09-19 PROCEDURE — 80061 LIPID PANEL: CPT

## 2024-09-19 PROCEDURE — 36415 COLL VENOUS BLD VENIPUNCTURE: CPT

## 2024-09-19 PROCEDURE — 86803 HEPATITIS C AB TEST: CPT

## 2024-09-23 ENCOUNTER — OFFICE VISIT (OUTPATIENT)
Dept: SPEECH THERAPY | Facility: CLINIC | Age: 49
End: 2024-09-23

## 2024-09-23 ENCOUNTER — TELEPHONE (OUTPATIENT)
Dept: CARDIOLOGY CLINIC | Facility: CLINIC | Age: 49
End: 2024-09-23

## 2024-09-23 DIAGNOSIS — I69.920 APHASIA, LATE EFFECT OF CEREBROVASCULAR DISEASE: Primary | ICD-10-CM

## 2024-09-23 DIAGNOSIS — I63.81 CEREBROVASCULAR ACCIDENT (CVA) OF LEFT BASAL GANGLIA (HCC): ICD-10-CM

## 2024-09-23 PROCEDURE — 92507 TX SP LANG VOICE COMM INDIV: CPT | Performed by: SPEECH-LANGUAGE PATHOLOGIST

## 2024-09-23 NOTE — TELEPHONE ENCOUNTER
----- Message from EVE Zavala sent at 9/22/2024 10:27 PM EDT -----  Please call Galdino and inform him Lipid panel improved now WNL.  Thank you    Spoke with pt re: improved lipid panel

## 2024-09-23 NOTE — PROGRESS NOTES
Daily Speech Treatment Note    Today's date: 10/2/2024  Patient’s name: Galdino Gaines  : 1975  MRN: [id number]  Safety measures: h/o CVA  Referring provider: Evy Alvarez MD    Encounter Diagnosis     ICD-10-CM    1. Aphasia, late effect of cerebrovascular disease  I69.920       2. Cerebrovascular accident (CVA) of left basal ganglia (HCC)  I63.81         Visit Tracking:  POC   Expires Auth Expiration Date ST Visit Limit   10/12/2024 CC approved until 24              Visit/Unit Tracking:  Auth Status Date 10/02/24   CC approved until 24 Used 32    Remaining      Subjective/Behavioral:  -Patient arrived with no new concerns today.    -It was reported that patient[place] and that he was approved for the [facility] until 2024. Clinician presented patient with a print out of his October schedule, which was put together last month. Clinician discussed options with his schedule, including keeping the frequency at 2x/week as recommended, reducing to 1x/week, 1x every other week, or placing therapy on hold, as patient demonstrated concern with financial obligations. Patient will return home after therapy and discuss with his sister.    Objective/Assessment:  -Reviewed patient's home exercises/activities completed since last appointment. Analogies (see below).    Short-term goals:  Patient will complete concrete and abstract categorization tasks to 90% accuracy to facilitate improved generative naming skills (to be achieved in 8-10 weeks). -- PARTIALLY MET     Patient will complete word generation tasks (e.g., verbal fluency, categorization, analogies, synonyms/antonyms, etc.) with 80% accuracy using word finding strategies to facilitate improved word retrieval skills (to be achieved in 8-10 weeks). -- PARTIALLY MET  -Analogies: Patient was presented with an analogy and was asked to solve (e.g., “Hair is to head as nail is to ___.” = FINGER). Task completed in  49/60 opp (82% acc) independently for HEP, increasing to 60/60 opp (100% acc) with min semantic cues from clinician.     Patient will name up to 5 features to describe a person/place/thing with 80% accuracy to facilitate improved utilization of circumlocution strategy (to be achieved in 8-10 weeks). -- PARTIALLY MET     Patient will complete picture description tasks using language organization strategies with 80% accuracy to facilitate improved utilization of circumlocution strategy (to be achieved in 8-10 weeks). -- PARTIALLY MET     Patient will answer complex verbal yes/no questions with 80% accuracy to facilitate increased auditory comprehension skills (to be achieved in 8-10 weeks). -- PARTIALLY MET     Patient will follow multistep verbal directions with 80% accuracy to facilitate increased auditory comprehension skills (to be achieved in 8-10 weeks). -- PARTIALLY MET  -Auditory comprehension/processing (pictures with directions): Patient listened to directions read aloud and asked to utilize information to make markings on a worksheet.   *1-step directions: Task completed in 5/7 opp (71% acc) independently, increasing to 6/7 opp (86% acc) with min-mod verbal repetition cues.    *2-step directions: Task completed in 3/8 opp (38% acc) independently, increasing to 7/8 opp (88% acc) with mod verbal repetition cues. Patient benefited from directions being broken down into simpler parts. Patient was noted to benefit from repeating direction aloud to himself to increase comprehension and task execution.     Patient will complete writing tasks at the sentence level with 80% accuracy to increase writing skills within FLE (to be achieved in 8-10 weeks). -- PARTIALLY MET     NEW GOAL:  Patient will complete BDAE (short form assessment--[place] Naming Test & Narrative Writing) testing for further development of POC/goals (to be achieved in 1-2 weeks).     -Patient reported back to clinician that his sister feels like his  is currently performing at 70% (communication abilities) as compared to PLOF.    Plan:  -Patient was provided with home exercises/activities to target goals in plan of care at the end of today's session.  -Continue with current plan of care.

## 2024-09-25 ENCOUNTER — APPOINTMENT (OUTPATIENT)
Dept: SPEECH THERAPY | Facility: CLINIC | Age: 49
End: 2024-09-25

## 2024-09-25 DIAGNOSIS — I69.920 APHASIA, LATE EFFECT OF CEREBROVASCULAR DISEASE: Primary | ICD-10-CM

## 2024-09-25 DIAGNOSIS — I63.81 CEREBROVASCULAR ACCIDENT (CVA) OF LEFT BASAL GANGLIA (HCC): ICD-10-CM

## 2024-09-28 NOTE — PROGRESS NOTES
Daily Speech Treatment Note    Today's date: 10/7/2024  Patient’s name: Galdino Gaines  : 1975  MRN: [id number]  Safety measures: h/o CVA  Referring provider: Evy Alvarez MD    Encounter Diagnosis     ICD-10-CM    1. Aphasia, late effect of cerebrovascular disease  I69.920       2. Cerebrovascular accident (CVA) of left basal ganglia (HCC)  I63.81         Visit Tracking:  POC   Expires Auth Expiration Date ST Visit Limit   10/12/2024 CC approved until 24                Visit/Unit Tracking:  Auth Status Date 10/02/24 10/07/24   CC approved until 24 Used 32 33     Remaining        Subjective/Behavioral:  -Patient reported that he'd like to continue attending OP ST 2x/week.    Objective/Assessment:  -Reviewed patient's home exercises/activities completed since last appointment.      Short-term goals:  Patient will complete concrete and abstract categorization tasks to 90% accuracy to facilitate improved generative naming skills (to be achieved in 8-10 weeks). -- PARTIALLY MET     Patient will complete word generation tasks (e.g., verbal fluency, categorization, analogies, synonyms/antonyms, etc.) with 80% accuracy using word finding strategies to facilitate improved word retrieval skills (to be achieved in 8-10 weeks). -- PARTIALLY MET     Patient will name up to 5 features to describe a person/place/thing with 80% accuracy to facilitate improved utilization of circumlocution strategy (to be achieved in 8-10 weeks). -- PARTIALLY MET  -Word finding/semantic descriptors: The game “Juice” was utilized to target word finding/circumlocution. Patient provided appropriate clues in  opp (66% acc), increasing to 10/12 opp (83% acc) with mod semantic cues. Patient benefited from [employer] web. Patient guessed clue words presented by clinician Task completed in  opp (67% acc) independently, increasing to  opp (75% acc) with mod semantic cues, increasing to  opp (92% acc) with min phonemic  cues. Discussed strategies to assist with word finding abilities in daily life.      Patient will complete picture description tasks using language organization strategies with 80% accuracy to facilitate improved utilization of circumlocution strategy (to be achieved in 8-10 weeks). -- PARTIALLY MET     Patient will answer complex verbal yes/no questions with 80% accuracy to facilitate increased auditory comprehension skills (to be achieved in 8-10 weeks). -- PARTIALLY MET     Patient will follow multistep verbal directions with 80% accuracy to facilitate increased auditory comprehension skills (to be achieved in 8-10 weeks). -- PARTIALLY MET     Patient will complete writing tasks at the sentence level with 80% accuracy to increase writing skills within FLE (to be achieved in 8-10 weeks). -- PARTIALLY MET     NEW GOAL:  Patient will complete BDAE (short form assessment--[place] Naming Test & Narrative Writing) testing for further development of POC/goals (to be achieved in 1-2 weeks).     Plan:  -Patient was provided with home exercises/activities to target goals in plan of care at the end of today's session.  -Continue with current plan of care.

## 2024-09-30 NOTE — PROGRESS NOTES
Daily Speech Treatment Note    Today's date: 10/9/2024  Patient’s name: Galdino Gaines  : 1975  MRN: [id number]  Safety measures: h/o CVA  Referring provider: Evy Alvarez MD    Encounter Diagnosis     ICD-10-CM    1. Aphasia, late effect of cerebrovascular disease  I69.920       2. Cerebrovascular accident (CVA) of left basal ganglia (HCC)  I63.81         Visit Tracking:  POC   Expires Auth Expiration Date ST Visit Limit   10/12/2024 CC approved until 24                Visit/Unit Tracking:  Auth Status Date 10/02/24 10/07/24 10/09/24   CC approved until 24 Used 32 33 34     Remaining          Subjective/Behavioral:  -Patient presented to his appointment late, but was able to be seen for condensed session (30 minutes).    Objective/Assessment:  -Reviewed patient's home exercises/activities completed since last appointment. See below.     Short-term goals:  Patient will complete concrete and abstract categorization tasks to 90% accuracy to facilitate improved generative naming skills (to be achieved in 8-10 weeks). -- PARTIALLY MET     Patient will complete word generation tasks (e.g., verbal fluency, categorization, analogies, synonyms/antonyms, etc.) with 80% accuracy using word finding strategies to facilitate improved word retrieval skills (to be achieved in 8-10 weeks). -- PARTIALLY MET  -Word deduction: Patient was presented with pictured objects and asked to find 10 that began with a specified letter of the alphabet. Task completed in 42/50 opp (84% acc) independently, increasing to 50/50 opp (100% acc) with min-mod semantic cues. *It should be noted that patient was implementing strategies (SFA) to attempt to self-cue. This is an improvement!      -Word finding: Patient was presented with a list of words and asked to generate antonyms. Task completed in 16/30 opp (53% acc) independently, increasing to 30/30 opp (100% acc) with min-mod semantic cues.    Patient will name up to 5  features to describe a person/place/thing with 80% accuracy to facilitate improved utilization of circumlocution strategy (to be achieved in 8-10 weeks). -- PARTIALLY MET     Patient will complete picture description tasks using language organization strategies with 80% accuracy to facilitate improved utilization of circumlocution strategy (to be achieved in 8-10 weeks). -- PARTIALLY MET     Patient will answer complex verbal yes/no questions with 80% accuracy to facilitate increased auditory comprehension skills (to be achieved in 8-10 weeks). -- PARTIALLY MET     Patient will follow multistep verbal directions with 80% accuracy to facilitate increased auditory comprehension skills (to be achieved in 8-10 weeks). -- PARTIALLY MET     Patient will complete writing tasks at the sentence level with 80% accuracy to increase writing skills within FLE (to be achieved in 8-10 weeks). -- PARTIALLY MET     NEW GOAL:  Patient will complete BDAE (short form assessment--[place] Naming Test & Narrative Writing) testing for further development of POC/goals (to be achieved in 1-2 weeks).     Plan:  -Patient was provided with home exercises/activities to target goals in plan of care at the end of today's session.  -Continue with current plan of care.

## 2024-10-02 ENCOUNTER — OFFICE VISIT (OUTPATIENT)
Dept: SPEECH THERAPY | Facility: CLINIC | Age: 49
End: 2024-10-02

## 2024-10-02 DIAGNOSIS — I69.920 APHASIA, LATE EFFECT OF CEREBROVASCULAR DISEASE: Primary | ICD-10-CM

## 2024-10-02 DIAGNOSIS — I63.81 CEREBROVASCULAR ACCIDENT (CVA) OF LEFT BASAL GANGLIA (HCC): ICD-10-CM

## 2024-10-02 PROCEDURE — 92507 TX SP LANG VOICE COMM INDIV: CPT | Performed by: SPEECH-LANGUAGE PATHOLOGIST

## 2024-10-06 NOTE — PROGRESS NOTES
Daily Speech Treatment Note    Today's date: 10/16/2024  Patient’s name: Galdino Gaines  : 1975  MRN: [id number]  Safety measures: h/o CVA  Referring provider: Evy Alvarez MD    Encounter Diagnosis     ICD-10-CM    1. Aphasia, late effect of cerebrovascular disease  I69.920       2. Cerebrovascular accident (CVA) of left basal ganglia (HCC)  I63.81         Visit Tracking:  POC   Expires Auth Expiration Date ST Visit Limit   2024 CC approved until 24 BOMN              Visit/Unit Tracking:  Auth Status Date 10/14/24 10/16/24   CC approved until 24 Used 35 36     Remaining BOMN BOMN     Subjective/Behavioral:  -Patient reported that he was experiencing mild symptoms of a cold.    Objective/Assessment:  -Reviewed testing results and goals in plan care with patient. Patient is in agreement at this time.  -Reviewed patient's home exercises/activities completed since last appointment. See below.    Short-term goals:  Patient will complete concrete and abstract categorization tasks to 90% accuracy to facilitate improved generative naming skills (to be achieved in 8-10 weeks). -- PARTIALLY MET/ONGOING    Patient will complete word generation tasks (e.g., verbal fluency, categorization, analogies, synonyms/antonyms, etc.) with 80% accuracy using word finding strategies to facilitate improved word retrieval skills (to be achieved in 8-10 weeks). -- PARTIALLY MET/ONGOING  -What may result (what/if): Patient was asked to explain what may result in a series of situations. Task completed in  opp (60% acc) independently, increasing to  opp (100% acc) with min verbal cues.     -Who would say: Patient was asked to identify who would say a series of phrases. Task completed in 9/15 opp (60% acc) independently, increasing to 15/15 opp (100% acc) with min semantic cues.     -What might cause someone to say...?: Patient was presented with statements and asked to determine what might cause someone  to say them. Task completed across 15 trials with mod-max verbal cues from clinician. The structure of this exercise was difficult for patient.    Patient will name up to 5 features to describe a person/place/thing with 80% accuracy to facilitate improved utilization of circumlocution strategy (to be achieved in 8-10 weeks). -- PARTIALLY MET/ONGOING    Patient will answer complex verbal yes/no questions with 80% accuracy to facilitate increased auditory comprehension skills (to be achieved in 8-10 weeks). -- PARTIALLY MET/ONGOING    Patient will follow multistep verbal directions with 80% accuracy to facilitate increased auditory comprehension skills (to be achieved in 8-10 weeks). -- PARTIALLY MET/ONGOING    Plan:  -Patient was provided with home exercises/activities to target goals in plan of care at the end of today's session.  -Continue with current plan of care.

## 2024-10-06 NOTE — PROGRESS NOTES
Speech-Language Pathology Re-Evaluation    Today's date: 10/14/2024  Patient’s name: Galdino Gaines  : 1975  MRN: [id number]  Safety measures: h/o CVA  Referring provider: Evy Alvarez MD    Encounter Diagnosis     ICD-10-CM    1. Aphasia, late effect of cerebrovascular disease  I69.920       2. Cerebrovascular accident (CVA) of left basal ganglia (HCC)  I63.81         Assessment:   Patient participated in a standardized aphasia evaluation (WAB-R) on this date of service. Patient demonstrated an improvement in his overall Aphasia Quotient (89.2 vs. 83) and Language Quotient (89.5 vs. 77.4) as compared to his initial evaluation (2024) testing scores. It should be noted that patient is bilingual (primary language is Twi--patient is from [facility]; he is also fluent in English). Testing scores should be interpreted with caution.    Overall, patient is demonstrating improvements with his speech-language skills. Patient presents with mild-moderate (improving) aphasia s/p CVA. Verbal expression deficits can be c/b difficulty with word retrieval and verbal fluency. Patient is beginning to increase his independence with circumlocution to attempt to self-cue as trained during Semantic Feature Analysis therapy activities. Patient benefits from additional language processing time, as well as semantic and phonemic cues as needed during communication breakdowns. Patient also benefits from visual cues (e.g., pictures from internet) to aide with communication success. Patient continues with echolalic speech. Verbal repetition is intact. Improvements with graphic expression; some spelling difficulties and missing of articles with sentence formation (however, English is patient's second language; ?if this is patient's baseline status). Auditory comprehension deficits can be c/b inconsistent difficulty with answering complex yes/no questions and following of commands, as well as reduced processing speed. Patient  benefits from verbal repetition and rephrasing, as well as increased processing time to increase communication success. Patient attempts to self-cue by repeating clinician's statements aloud when demonstrating a breakdown in comprehension. Patient with improved reading comprehension; patient benefits from additional time, as well as reading material a second time through, as compensatory strategies. Patient would benefit from continued outpatient skilled Speech Therapy services to promote positive communication interactions with both familiar & unfamiliar listeners, for further education/training compensatory strategies, to follow directions within functional activities, to increase participation in meaningful activities, to facilitate overall improved quality of life, to reduce caregiver burden, and to receive instruction on HEP.      Short-term goals:  Patient will be educated on word finding strategies (i.e., circumlocution) for improved generative naming and verbal expression skills (to be achieved in 1-2 weeks). -- MET     Patient will complete concrete and abstract categorization tasks to 90% accuracy to facilitate improved generative naming skills (to be achieved in 8-10 weeks). -- PARTIALLY MET/ONGOING     Patient will complete word generation tasks (e.g., verbal fluency, categorization, analogies, synonyms/antonyms, etc.) with 80% accuracy using word finding strategies to facilitate improved word retrieval skills (to be achieved in 8-10 weeks). -- PARTIALLY MET/ONGOING     Patient will name up to 5 features to describe a person/place/thing with 80% accuracy to facilitate improved utilization of circumlocution strategy (to be achieved in 8-10 weeks). -- PARTIALLY MET/ONGOING     Patient will complete picture description tasks using language organization strategies with 80% accuracy to facilitate improved utilization of circumlocution strategy (to be achieved in 8-10 weeks). -- MET     Patient will answer  "complex verbal yes/no questions with 80% accuracy to facilitate increased auditory comprehension skills (to be achieved in 8-10 weeks). -- PARTIALLY MET/ONGOING     Patient will follow multistep verbal directions with 80% accuracy to facilitate increased auditory comprehension skills (to be achieved in 8-10 weeks). -- PARTIALLY MET/ONGOING     Patient will complete writing tasks at the sentence level with 80% accuracy to increase writing skills within FLE (to be achieved in 8-10 weeks). -- MET     Patient will read short paragraphs and answer multiple choice, yes/no, fill in the blank, and open-ended questions with 80% accuracy to facilitate increased comprehension of functional reading material (to be achieved in 8-10 weeks). -- MET     Patient will complete BDAE (short form assessment--[place] Naming Test & Narrative Writing) testing for further development of POC/goals (to be achieved in 1-2 weeks). -- MET    Long-term goals:  Patient will demonstrate improved expressive and receptive language skills from moderate to mild-WNL during structured and unstructured tasks (to be achieved by discharge). -- PARTIALLY MET/MET     Patient will improve ability to facilitate communication to meet needs including use of compensatory strategies to promote meaningful interactions for improved quality of life and maximize level of independence (to be achieved by discharge). -- PARTIALLY MET/MET      Plan:  Patient would benefit from outpatient skilled Speech Therapy services: Speech-language therapy    Frequency: 2x weekly  Duration: 2 months    Intervention certification from: 10/14/2024  Intervention certification to: 12/14/2024      Subjective:  Patient presented to today's session late secondary to public transportation. Patient was able to participate in full aphasia testing during session. Patient with no new concerns today.    Patient's goal(s): \"to recollect and be fast with speaking, and to be able to write well\" "       Objective (testing):  The Western Aphasia Battery-Revised (WAB-R) is designed to evaluate a patient's language function following CVA, dementia, or other acquired neurological disorder. It measures a patient’s linguistic skills, such as speech content, fluency, auditory comprehension, repetition, naming, reading, and writing. The WAB-R also measures a patient’s nonlinguistic skills, including drawing, calculation, block design, and apraxia. The purpose of this standardized assessment is to (1) determine the presence, severity, and type of aphasia; (2) measure the patient's level of performance to provide a baseline for detecting change over time; (3) provide a comprehensive assessment of the patient's language assets and deficits in order to guide treatment and management; and (4) infer the location and etiology of the lesion causing aphasia. 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    Due to time constraints, only portions of the standardized assessment were administered on this date of service.    PART 2: Score: IE: Status:   -Reading Score: 17. 14 IMPROVEMENT   -Writin/20 14.2 IMPROVEMENT   -Apraxia: DNT     -Constructional, Visuospatial, & Calculation: DNT          Score: IE: Status:   *Aphasia Quotient (AQ): 89.2/100 83 IMPROVEMENT   *Language Quotient (LQ): 89.5/100 77.4 IMPROVEMENT   *Cortical Quotient (CQ): DNT       “IE” indicates the scores from the initial evaluation (2024).      Treatment:  N/A      Visit Tracking:  POC   Expires Auth Expiration Date ST Visit Limit   2024 CC approved until 24 BOMN              Visit/Unit Tracking:  Auth Status Date 10/14/24    CC approved until 12/22/24 Used 35     Remaining BOMN       Intervention comments:  -Re-evaluation/administration of standardized test with patient (33 minutes)  -Scoring/interpretation of the standardized test for the development of POC, and write-up of the report (45 minutes)

## 2024-10-07 ENCOUNTER — OFFICE VISIT (OUTPATIENT)
Dept: SPEECH THERAPY | Facility: CLINIC | Age: 49
End: 2024-10-07

## 2024-10-07 DIAGNOSIS — I63.81 CEREBROVASCULAR ACCIDENT (CVA) OF LEFT BASAL GANGLIA (HCC): ICD-10-CM

## 2024-10-07 DIAGNOSIS — I69.920 APHASIA, LATE EFFECT OF CEREBROVASCULAR DISEASE: Primary | ICD-10-CM

## 2024-10-07 PROCEDURE — 92507 TX SP LANG VOICE COMM INDIV: CPT | Performed by: SPEECH-LANGUAGE PATHOLOGIST

## 2024-10-09 ENCOUNTER — OFFICE VISIT (OUTPATIENT)
Dept: SPEECH THERAPY | Facility: CLINIC | Age: 49
End: 2024-10-09

## 2024-10-09 DIAGNOSIS — I69.920 APHASIA, LATE EFFECT OF CEREBROVASCULAR DISEASE: Primary | ICD-10-CM

## 2024-10-09 DIAGNOSIS — I63.81 CEREBROVASCULAR ACCIDENT (CVA) OF LEFT BASAL GANGLIA (HCC): ICD-10-CM

## 2024-10-09 PROCEDURE — 92507 TX SP LANG VOICE COMM INDIV: CPT | Performed by: SPEECH-LANGUAGE PATHOLOGIST

## 2024-10-14 ENCOUNTER — EVALUATION (OUTPATIENT)
Dept: SPEECH THERAPY | Facility: CLINIC | Age: 49
End: 2024-10-14

## 2024-10-14 DIAGNOSIS — I69.920 APHASIA, LATE EFFECT OF CEREBROVASCULAR DISEASE: Primary | ICD-10-CM

## 2024-10-14 DIAGNOSIS — I63.81 CEREBROVASCULAR ACCIDENT (CVA) OF LEFT BASAL GANGLIA (HCC): ICD-10-CM

## 2024-10-14 PROCEDURE — 96105 ASSESSMENT OF APHASIA: CPT | Performed by: SPEECH-LANGUAGE PATHOLOGIST

## 2024-10-15 NOTE — PROGRESS NOTES
Daily Speech Treatment Note    Today's date: 10/21/2024  Patient’s name: Galdino Gaines  : 1975  MRN: [id number]  Safety measures: h/o CVA  Referring provider: Evy Alvarez MD    Encounter Diagnosis     ICD-10-CM    1. Aphasia, late effect of cerebrovascular disease  I69.920       2. Cerebrovascular accident (CVA) of left basal ganglia (HCC)  I63.81         Visit Tracking:  POC   Expires Auth Expiration Date ST Visit Limit   2024 CC approved until 24 BOMN              Visit/Unit Tracking:  Auth Status Date 10/14/24 10/16/24 10/21/24   CC approved until 24 Used 35 36 37     Remaining BOMN BOMN BOMN     Subjective/Behavioral:  -Patient reported that he had a busy weekend with travel.    Objective/Assessment:  -Reviewed patient's home exercises/activities completed since last appointment. Completing a paragraph with word bank completed in  opp, increasing to  opp with min verbal cues from clinician.    Short-term goals:  Patient will complete concrete and abstract categorization tasks to 90% accuracy to facilitate improved generative naming skills (to be achieved in 8-10 weeks). -- PARTIALLY MET/ONGOING    Patient will complete word generation tasks (e.g., verbal fluency, categorization, analogies, synonyms/antonyms, etc.) with 80% accuracy using word finding strategies to facilitate improved word retrieval skills (to be achieved in 8-10 weeks). -- PARTIALLY MET/ONGOING  -Word generation: The game “Speedy Words” was utilized to target word finding/categorizatoin. Patient was instructed to generate a target word based upon a given category and specific letter of the alphabet (e.g., occupation M = “”). Task completed in  opp (65% acc) independently, increasing to 15/17 opp (88% acc) with min-mod semantic cues.    Patient will name up to 5 features to describe a person/place/thing with 80% accuracy to facilitate improved utilization of circumlocution strategy (to be  "achieved in 8-10 weeks). -- PARTIALLY MET/ONGOING  -Constant characteristics: Patient was provided with a sentences and asked to supply its ending (e.g., \"All doors have ___.\" = KNOBS). Task completed in 8/10 opp (80% acc) independently, increasing to 10/10 opp (100% acc) with min semantic cues.     Patient will answer complex verbal yes/no questions with 80% accuracy to facilitate increased auditory comprehension skills (to be achieved in 8-10 weeks). -- PARTIALLY MET/ONGOING    Patient will follow multistep verbal directions with 80% accuracy to facilitate increased auditory comprehension skills (to be achieved in 8-10 weeks). -- PARTIALLY MET/ONGOING    Plan:  -Patient was provided with home exercises/activities to target goals in plan of care at the end of today's session.  -Continue with current plan of care.  "

## 2024-10-15 NOTE — PROGRESS NOTES
"Daily Speech Treatment Note    Today's date: 10/23/2024   Patient’s name: Galdino Gaines  : 1975  MRN: [id number]  Safety measures: h/o CVA  Referring provider: Evy Alvarez MD    Encounter Diagnosis     ICD-10-CM    1. Aphasia, late effect of cerebrovascular disease  I69.920       2. Cerebrovascular accident (CVA) of left basal ganglia (HCC)  I63.81         Visit Tracking:  POC   Expires Auth Expiration Date ST Visit Limit   2024 CC approved until 24 BOMN              Visit/Unit Tracking:  Auth Status Date 10/14/24 10/16/24 10/21/24 10/23/24   CC approved until 24 Used 35 36 37 38     Remaining BOMN BOMN BOMN BOMN       Subjective/Behavioral:  -Patient was in good spirits! The therapy activities today were difficult but Patient was motivated and tried his best throughout the duration of the therapy session!    Objective/Assessment:  -Reviewed patient's home exercises/activities completed since last appointment. Patient independently completed 30/32 (94%) \"constant characteristics\" worksheet problems for homework. Given minimal verbal cues, the Patient correct 2 incorrect answers, resulting in 32/32 (100%) correct answers.      Short-term goals:  Patient will complete concrete and abstract categorization tasks to 90% accuracy to facilitate improved generative naming skills (to be achieved in 8-10 weeks). -- PARTIALLY MET/ONGOING    Patient will complete word generation tasks (e.g., verbal fluency, categorization, analogies, synonyms/antonyms, etc.) with 80% accuracy using word finding strategies to facilitate improved word retrieval skills (to be achieved in 8-10 weeks). -- PARTIALLY MET/ONGOING    Patient will name up to 5 features to describe a person/place/thing with 80% accuracy to facilitate improved utilization of circumlocution strategy (to be achieved in 8-10 weeks). -- PARTIALLY MET/ONGOING  -To target expressive language: Patient was shown two images that are semantically " related (e.g. apple/orange) and was instructed to state how the items are similar and how they are different.  Patient stated at least one similarity in 9/9 opp (100% acc) independently.  Patient stated at least two differences in 15/18 opp (83% acc)  opportunities independently, increasing to 18/18 opp (100% acc)  opportunities given semantic cues. A second page of this activity was given to the Patient to be completed for homework.     -Word finding/semantic descriptors: The Patient was given cards with a line drawing of an object on it. He was tasked with describing the object to the student clinician so she could guess the object. This task targets  word finding/circumlocution. Patient provided appropriate clues independently in 3/6 opp (50%acc), increasing to 6/6 opp (100%acc) given moderate verbal cues. Patient benefited from [employer] web during this task.     Patient will answer complex verbal yes/no questions with 80% accuracy to facilitate increased auditory comprehension skills (to be achieved in 8-10 weeks). -- PARTIALLY MET/ONGOING    Patient will follow multistep verbal directions with 80% accuracy to facilitate increased auditory comprehension skills (to be achieved in 8-10 weeks). -- PARTIALLY MET/ONGOING  -To target auditory memory and direction following: Patient was listen to two-step, four-component directions read aloud to be followed on a worksheet. Task completed in 7/12 opp (58% acc) independently, increasing to 10/12 opp (83% acc) with 1 repetition, increasing to 12/12 opp (100% acc) with a minimal verbal cue.     Plan:  -Patient was provided with home exercises/activities to target goals in plan of care at the end of today's session.  -Continue with current plan of care.

## 2024-10-16 ENCOUNTER — OFFICE VISIT (OUTPATIENT)
Dept: SPEECH THERAPY | Facility: CLINIC | Age: 49
End: 2024-10-16

## 2024-10-16 DIAGNOSIS — I63.81 CEREBROVASCULAR ACCIDENT (CVA) OF LEFT BASAL GANGLIA (HCC): ICD-10-CM

## 2024-10-16 DIAGNOSIS — I69.920 APHASIA, LATE EFFECT OF CEREBROVASCULAR DISEASE: Primary | ICD-10-CM

## 2024-10-16 PROCEDURE — 92507 TX SP LANG VOICE COMM INDIV: CPT | Performed by: SPEECH-LANGUAGE PATHOLOGIST

## 2024-10-20 NOTE — PROGRESS NOTES
Daily Speech Treatment Note    Today's date: 10/30/2024  Patient’s name: Galdino Gaines  : 1975  MRN: [id number]  Safety measures: h/o CVA  Referring provider: Evy Alvarez MD    Encounter Diagnosis     ICD-10-CM    1. Aphasia, late effect of cerebrovascular disease  I69.920       2. Cerebrovascular accident (CVA) of left basal ganglia (HCC)  I63.81         Visit Tracking:  POC   Expires Auth Expiration Date ST Visit Limit   2024 CC approved until 24 BOMN              Visit/Unit Tracking:  Auth Status Date 10/14/24 10/16/24 10/21/24 10/23/24 10/28/24 10/30/24   CC approved until 24 Used 35 36 37 38 39 40     Remaining BOMN BOMN BOMN BOMN BOMN BOMN     Subjective/Behavioral:  -Patient arrived in good spirits and was ready to begin working.    Objective/Assessment:    Short-term goals:  Patient will complete concrete and abstract categorization tasks to 90% accuracy to facilitate improved generative naming skills (to be achieved in 8-10 weeks). -- PARTIALLY MET/ONGOING    Patient will complete word generation tasks (e.g., verbal fluency, categorization, analogies, synonyms/antonyms, etc.) with 80% accuracy using word finding strategies to facilitate improved word retrieval skills (to be achieved in 8-10 weeks). -- PARTIALLY MET/ONGOING  -Language organization: Patient was presented with 5-7 words and asked to rearrange the words to form a logical sentence. Task completed in 12/15 opp (80% acc) independently, increasing to 15/15 opp (100% acc) with min verbal/visual cues.    Patient will name up to 5 features to describe a person/place/thing with 80% accuracy to facilitate improved utilization of circumlocution strategy (to be achieved in 8-10 weeks). -- PARTIALLY MET/ONGOING  -Word generation: Patient was presented with 2 words and asked to state 2 ways they are alike and 2 ways they are different (e.g., pen--pencil).   *Similarities: Task completed in 6/10 opp (60% acc) independently,  increasing to 9/10 opp (90% acc) with min semantic cues.   *Differences: Task completed in 8/10 opp (80% acc) independently, increasing to 10/10 opp (100% acc) with min semantic cues.    Patient benefited from viewing pictures of objects during task execution.    Patient will answer complex verbal yes/no questions with 80% accuracy to facilitate increased auditory comprehension skills (to be achieved in 8-10 weeks). -- PARTIALLY MET/ONGOING    Patient will follow multistep verbal directions with 80% accuracy to facilitate increased auditory comprehension skills (to be achieved in 8-10 weeks). -- PARTIALLY MET/ONGOING    Plan:  -Patient was provided with home exercises/activities to target goals in plan of care at the end of today's session.  -Continue with current plan of care.

## 2024-10-20 NOTE — PROGRESS NOTES
Daily Speech Treatment Note    Today's date: 10/28/2024   Patient’s name: Galdino Gaines  : 1975  MRN: [id number]  Safety measures: h/o CVA  Referring provider: Evy Alvarez MD    Encounter Diagnosis     ICD-10-CM    1. Aphasia, late effect of cerebrovascular disease  I69.920       2. Cerebrovascular accident (CVA) of left basal ganglia (HCC)  I63.81         Visit Tracking:  POC   Expires Auth Expiration Date ST Visit Limit   2024 CC approved until 24 BOMN              Visit/Unit Tracking:  Auth Status Date 10/14/24 10/16/24 10/21/24 10/23/24 10/28/24   CC approved until 24 Used 35 36 37 38 39     Remaining BOMN BOMN BOMN BOMN BOMN       Subjective/Behavioral:  -Patient expressed that today's tasks were difficult but student clinician reassured him that he was performing well even though it was challenging.    Objective/Assessment:  -Reviewed patient's home exercises/activities completed since last appointment. One question was left unanswered on his homework and was completed at the beginning of the session.     Short-term goals:  Patient will complete concrete and abstract categorization tasks to 90% accuracy to facilitate improved generative naming skills (to be achieved in 8-10 weeks). -- PARTIALLY MET/ONGOING  To target generative naming: Patient was verbally presented a list of words within an abstract category and tasked with identifying the category and adding an item to the given category. The Patient identified the abstract category in 2/5 (40%) opportunities, increasing to 5/5 (100%) opportunities, given minimal verbal cues. The Patient added an additional item to the category in 1/5 (20%) opportunities, increasing to 5/5 (100%) opportunities given moderate verbal cues.    Patient will complete word generation tasks (e.g., verbal fluency, categorization, analogies, synonyms/antonyms, etc.) with 80% accuracy using word finding strategies to facilitate improved word retrieval  skills (to be achieved in 8-10 weeks). -- PARTIALLY MET/ONGOING    Patient will name up to 5 features to describe a person/place/thing with 80% accuracy to facilitate improved utilization of circumlocution strategy (to be achieved in 8-10 weeks). -- PARTIALLY MET/ONGOING  To target differential descriptions: Patient was presented with four images (e.g. tape measure, calendar, sun dial, watch) and was instructed to find the one item that does not belong (e.g. tape measure).  Patient completed this task in 8/12 (67%) opportunities independently, increasing to 10/12 (83%) opportunities given semantic cues.  Patient was then instructed to state why that item did not belong (e.g. measures length instead of time).  Patient completed this task in 5/12 (42%) opportunities independently, increasing to 10/12 (83%) opportunities given semantic cues.    Patient will answer complex verbal yes/no questions with 80% accuracy to facilitate increased auditory comprehension skills (to be achieved in 8-10 weeks). -- PARTIALLY MET/ONGOING    Patient will follow multistep verbal directions with 80% accuracy to facilitate increased auditory comprehension skills (to be achieved in 8-10 weeks). -- PARTIALLY MET/ONGOING    Plan:  -Patient was provided with home exercises/activities to target goals in plan of care at the end of today's session.  -Continue with current plan of care.

## 2024-10-21 ENCOUNTER — OFFICE VISIT (OUTPATIENT)
Dept: SPEECH THERAPY | Facility: CLINIC | Age: 49
End: 2024-10-21

## 2024-10-21 DIAGNOSIS — I63.81 CEREBROVASCULAR ACCIDENT (CVA) OF LEFT BASAL GANGLIA (HCC): ICD-10-CM

## 2024-10-21 DIAGNOSIS — I69.920 APHASIA, LATE EFFECT OF CEREBROVASCULAR DISEASE: Primary | ICD-10-CM

## 2024-10-21 PROCEDURE — 92507 TX SP LANG VOICE COMM INDIV: CPT | Performed by: SPEECH-LANGUAGE PATHOLOGIST

## 2024-10-23 ENCOUNTER — OFFICE VISIT (OUTPATIENT)
Dept: SPEECH THERAPY | Facility: CLINIC | Age: 49
End: 2024-10-23

## 2024-10-23 DIAGNOSIS — I69.920 APHASIA, LATE EFFECT OF CEREBROVASCULAR DISEASE: Primary | ICD-10-CM

## 2024-10-23 DIAGNOSIS — I63.81 CEREBROVASCULAR ACCIDENT (CVA) OF LEFT BASAL GANGLIA (HCC): ICD-10-CM

## 2024-10-23 PROCEDURE — 92507 TX SP LANG VOICE COMM INDIV: CPT | Performed by: SPEECH-LANGUAGE PATHOLOGIST

## 2024-10-28 ENCOUNTER — OFFICE VISIT (OUTPATIENT)
Dept: SPEECH THERAPY | Facility: CLINIC | Age: 49
End: 2024-10-28

## 2024-10-28 DIAGNOSIS — I63.81 CEREBROVASCULAR ACCIDENT (CVA) OF LEFT BASAL GANGLIA (HCC): ICD-10-CM

## 2024-10-28 DIAGNOSIS — I69.920 APHASIA, LATE EFFECT OF CEREBROVASCULAR DISEASE: Primary | ICD-10-CM

## 2024-10-28 PROCEDURE — 92507 TX SP LANG VOICE COMM INDIV: CPT | Performed by: SPEECH-LANGUAGE PATHOLOGIST

## 2024-10-28 NOTE — PROGRESS NOTES
Daily Speech Treatment Note    Today's date: 2024  Patient’s name: Galdino Gaines  : 1975  MRN: [id number]  Safety measures: h/o CVA  Referring provider: Evy Alvarez MD    Encounter Diagnosis     ICD-10-CM    1. Aphasia, late effect of cerebrovascular disease  I69.920       2. Cerebrovascular accident (CVA) of left basal ganglia (HCC)  I63.81         Visit Tracking:  POC   Expires Auth Expiration Date ST Visit Limit   2024 CC approved until 24 BOMN              Visit/Unit Tracking:  Auth Status Date 10/14/24 10/16/24 10/21/24 10/23/24 10/28/24 10/30/24 11/06/24   CC approved until 24 Used 35 36 37 38 39 40 41     Remaining BOMN BOMN BOMN BOMN BOMN BOMN BOMN     Subjective/Behavioral:  -Patient indicated that he met with a PCP on Monday (2024). BP was reportedly elevated. Patient was recommended Rx and to have blood work completed. He has been tracking his BP measures at home. Will f/u with PCP again next Monday (2024).    Objective/Assessment:  -Reviewed patient's home exercises/activities completed since last appointment. Unscrambling sentences completed in  opp independently, increasing to  opp with min-mod verbal/visual cues from clinician at start of today's session.    Short-term goals:  Patient will complete concrete and abstract categorization tasks to 90% accuracy to facilitate improved generative naming skills (to be achieved in 8-10 weeks). -- PARTIALLY MET/ONGOING    Patient will complete word generation tasks (e.g., verbal fluency, categorization, analogies, synonyms/antonyms, etc.) with 80% accuracy using word finding strategies to facilitate improved word retrieval skills (to be achieved in 8-10 weeks). -- PARTIALLY MET/ONGOING  -Word generation: Patient was presented with 2 words and asked to state 2 ways they are alike and 2 ways they are different (e.g., pen--pencil).   *Similarities: Task completed in  opp (100% acc)  independently.   *Differences: Task completed in 6/7 opp (86% acc) independently, increasing to 7/7 opp (100% acc) with min semantic cues.    Patient will name up to 5 features to describe a person/place/thing with 80% accuracy to facilitate improved utilization of circumlocution strategy (to be achieved in 8-10 weeks). -- PARTIALLY MET/ONGOING  -During informal conversation with clinician, improvements noted with fluency and content. Mild anomia present at times, but benefits from additional language processing time and min verbal cues from clinician.    Patient will answer complex verbal yes/no questions with 80% accuracy to facilitate increased auditory comprehension skills (to be achieved in 8-10 weeks). -- PARTIALLY MET/ONGOING    Patient will follow multistep verbal directions with 80% accuracy to facilitate increased auditory comprehension skills (to be achieved in 8-10 weeks). -- PARTIALLY MET/ONGOING    Plan:  -Patient was provided with home exercises/activities to target goals in plan of care at the end of today's session.  -Continue with current plan of care.

## 2024-10-28 NOTE — PROGRESS NOTES
Daily Speech Treatment Note    Today's date: 10/28/2024 ***  Patient’s name: Galdino Gaines  : 1975  MRN: [id number]  Safety measures: h/o CVA  Referring provider: Evy Alvarez MD    Encounter Diagnosis     ICD-10-CM    1. Aphasia, late effect of cerebrovascular disease  I69.920       2. Cerebrovascular accident (CVA) of left basal ganglia (HCC)  I63.81         Visit Tracking:  ***    Subjective/Behavioral:  -***    Objective/Assessment:  -Reviewed patient's home exercises/activities completed since last appointment. ***    Short-term goals:  Patient will complete concrete and abstract categorization tasks to 90% accuracy to facilitate improved generative naming skills (to be achieved in 8-10 weeks). -- PARTIALLY MET/ONGOING    Patient will complete word generation tasks (e.g., verbal fluency, categorization, analogies, synonyms/antonyms, etc.) with 80% accuracy using word finding strategies to facilitate improved word retrieval skills (to be achieved in 8-10 weeks). -- PARTIALLY MET/ONGOING    Patient will name up to 5 features to describe a person/place/thing with 80% accuracy to facilitate improved utilization of circumlocution strategy (to be achieved in 8-10 weeks). -- PARTIALLY MET/ONGOING    Patient will answer complex verbal yes/no questions with 80% accuracy to facilitate increased auditory comprehension skills (to be achieved in 8-10 weeks). -- PARTIALLY MET/ONGOING    Patient will follow multistep verbal directions with 80% accuracy to facilitate increased auditory comprehension skills (to be achieved in 8-10 weeks). -- PARTIALLY MET/ONGOING    Plan:  -Patient was provided with home exercises/activities to target goals in plan of care at the end of today's session.  -Continue with current plan of care.

## 2024-10-30 ENCOUNTER — OFFICE VISIT (OUTPATIENT)
Dept: SPEECH THERAPY | Facility: CLINIC | Age: 49
End: 2024-10-30

## 2024-10-30 DIAGNOSIS — I69.920 APHASIA, LATE EFFECT OF CEREBROVASCULAR DISEASE: Primary | ICD-10-CM

## 2024-10-30 DIAGNOSIS — I63.81 CEREBROVASCULAR ACCIDENT (CVA) OF LEFT BASAL GANGLIA (HCC): ICD-10-CM

## 2024-10-30 PROCEDURE — 92507 TX SP LANG VOICE COMM INDIV: CPT | Performed by: SPEECH-LANGUAGE PATHOLOGIST

## 2024-11-04 ENCOUNTER — APPOINTMENT (OUTPATIENT)
Dept: SPEECH THERAPY | Facility: CLINIC | Age: 49
End: 2024-11-04

## 2024-11-04 ENCOUNTER — OFFICE VISIT (OUTPATIENT)
Dept: FAMILY MEDICINE CLINIC | Facility: CLINIC | Age: 49
End: 2024-11-04

## 2024-11-04 VITALS
TEMPERATURE: 98.3 F | HEART RATE: 69 BPM | BODY MASS INDEX: 26.52 KG/M2 | RESPIRATION RATE: 18 BRPM | DIASTOLIC BLOOD PRESSURE: 90 MMHG | SYSTOLIC BLOOD PRESSURE: 154 MMHG | WEIGHT: 165 LBS | HEIGHT: 66 IN | OXYGEN SATURATION: 99 %

## 2024-11-04 DIAGNOSIS — I10 PRIMARY HYPERTENSION: Primary | ICD-10-CM

## 2024-11-04 PROCEDURE — 99213 OFFICE O/P EST LOW 20 MIN: CPT | Performed by: FAMILY MEDICINE

## 2024-11-04 RX ORDER — LOSARTAN POTASSIUM 50 MG/1
50 TABLET ORAL DAILY
Qty: 30 TABLET | Refills: 0 | Status: SHIPPED | OUTPATIENT
Start: 2024-11-04 | End: 2024-11-11

## 2024-11-04 NOTE — PROGRESS NOTES
Ambulatory Visit  Name: Galdino Gaines      : 1975      MRN: [id number]  Encounter Provider: Radha Platt MD  Encounter Date: 2024   Encounter department: [facility]    Assessment & Plan  Primary hypertension  Elevated BP in office today as well as elevated BP's at home. Pt reports hx of htn in the past that he was on medication for. Reports he has been taking old losartan 50 mg daily at home due to elevated BP's. Has a hx of CVA of left basal ganglia May 2024. Goal BP <130/80 given hx of cva. Will rx losartan 50 mg as this is what he reports he was previously taking. Will order BMP. Will follow up in 1 week with BP log and blood pressure cuff to check vs office bp cuff. ED precautions reviewed.         Orders:    losartan (COZAAR) 50 mg tablet; Take 1 tablet (50 mg total) by mouth daily    Basic metabolic panel; Future         History of Present Illness     Mr. Gaines presents to clinic for elevated BP without diagnosis. He reports his BP at home has been elevated, systolic 150-160 so he started left over losartan 50 mg that he has had in the past, does not recall how old the pills are or if they are . Denies symptomatic hypotension on losartan. Denies signs or symptoms of hypertensive urgency.       Review of Systems   Constitutional:  Negative for chills and fever.   HENT:  Negative for rhinorrhea and sore throat.    Eyes:  Negative for redness and visual disturbance.   Respiratory:  Negative for cough and shortness of breath.    Cardiovascular:  Negative for chest pain and palpitations.   Gastrointestinal:  Negative for abdominal pain, constipation and nausea.   Genitourinary:  Negative for difficulty urinating and dysuria.   Musculoskeletal:  Negative for arthralgias and myalgias.   Skin:  Negative for rash.   Allergic/Immunologic: Negative for environmental allergies and food allergies.   Neurological:  Negative for dizziness and  "headaches.     Past Medical History:   Diagnosis Date    Hypertension     Stroke (HCC)      No past surgical history on file.  No family history on file.  Social History     Tobacco Use    Smoking status: Never     Passive exposure: Never    Smokeless tobacco: Never   Vaping Use    Vaping status: Never Used   Substance and Sexual Activity    Alcohol use: Never    Drug use: Never    Sexual activity: Not Currently     Current Outpatient Medications on File Prior to Visit   Medication Sig    aspirin (ECOTRIN LOW STRENGTH) 81 mg EC tablet Take 1 tablet (81 mg total) by mouth daily    atorvastatin (LIPITOR) 80 mg tablet Take 1 tablet (80 mg total) by mouth every evening    chlorproMAZINE (THORAZINE) 25 mg tablet Take 1 tablet (25 mg total) by mouth 3 (three) times a day as needed (hiccups)     No Known Allergies    There is no immunization history on file for this patient.  Objective     /90 (BP Location: Left arm, Patient Position: Sitting, Cuff Size: Large)   Pulse 69   Temp 98.3 °F (36.8 °C) (Temporal)   Resp 18   Ht 5' 6\" (1.676 m)   Wt 74.8 kg (165 lb)   SpO2 99%   BMI 26.63 kg/m²     Physical Exam  Vitals reviewed.   Constitutional:       General: He is not in acute distress.     Appearance: Normal appearance.   HENT:      Head: Normocephalic and atraumatic.   Cardiovascular:      Rate and Rhythm: Normal rate and regular rhythm.      Pulses: Normal pulses.      Heart sounds: Normal heart sounds.   Pulmonary:      Effort: Pulmonary effort is normal. No respiratory distress.      Breath sounds: Normal breath sounds.   Abdominal:      General: Bowel sounds are normal. There is no distension.      Palpations: Abdomen is soft.   Musculoskeletal:         General: Normal range of motion.      Cervical back: Normal range of motion.      Right lower leg: No edema.      Left lower leg: No edema.   Skin:     General: Skin is warm.   Neurological:      Mental Status: He is alert and oriented to person, place, and " time.

## 2024-11-04 NOTE — PROGRESS NOTES
Daily Speech Treatment Note    Today's date: 2024 ***  Patient’s name: Galdino Gaines  : 1975  MRN: [id number]  Safety measures: h/o CVA  Referring provider: Evy Alvarez MD    Encounter Diagnosis     ICD-10-CM    1. Aphasia, late effect of cerebrovascular disease  I69.920       2. Cerebrovascular accident (CVA) of left basal ganglia (HCC)  I63.81         Visit Tracking:  ***    Subjective/Behavioral:  -***    Objective/Assessment:  -Reviewed patient's home exercises/activities completed since last appointment. ***    Short-term goals:  Patient will complete concrete and abstract categorization tasks to 90% accuracy to facilitate improved generative naming skills (to be achieved in 8-10 weeks). -- PARTIALLY MET/ONGOING    Patient will complete word generation tasks (e.g., verbal fluency, categorization, analogies, synonyms/antonyms, etc.) with 80% accuracy using word finding strategies to facilitate improved word retrieval skills (to be achieved in 8-10 weeks). -- PARTIALLY MET/ONGOING    Patient will name up to 5 features to describe a person/place/thing with 80% accuracy to facilitate improved utilization of circumlocution strategy (to be achieved in 8-10 weeks). -- PARTIALLY MET/ONGOING    Patient will answer complex verbal yes/no questions with 80% accuracy to facilitate increased auditory comprehension skills (to be achieved in 8-10 weeks). -- PARTIALLY MET/ONGOING    Patient will follow multistep verbal directions with 80% accuracy to facilitate increased auditory comprehension skills (to be achieved in 8-10 weeks). -- PARTIALLY MET/ONGOING    Plan:  -Patient was provided with home exercises/activities to target goals in plan of care at the end of today's session.  -Continue with current plan of care.

## 2024-11-04 NOTE — PROGRESS NOTES
"Daily Speech Treatment Note    Today's date: 2024  Patient’s name: Galdino Gaines  : 1975  MRN: [id number]  Safety measures: h/o CVA  Referring provider: Evy Alvarez MD    Encounter Diagnosis     ICD-10-CM    1. Aphasia, late effect of cerebrovascular disease  I69.920       2. Cerebrovascular accident (CVA) of left basal ganglia (HCC)  I63.81         Visit Tracking:  POC   Expires Auth Expiration Date ST Visit Limit   2024 CC approved until 24 BOMN              Visit/Unit Tracking:  Auth Status Date 10/14/24 10/16/24 10/21/24 10/23/24 10/28/24 10/30/24 11/06/24 11/13/24   CC approved until 24 Used 35 36 37 38 39 40 41 42     Remaining BOMN BOMN BOMN BOMN BOMN BOMN BOMN BOMN     Subjective/Behavioral:  -Patient reported that he met with his PCP again on Monday and indicated that his BP was elevated in the office, as well as with his home BP log. Patient reported that an adjustment was made with Losartan (2x/day) and he is to meet with his PCP again next Monday.    Objective/Assessment:  -Reviewed patient's home exercises/activities completed since last appointment. See below.    Short-term goals:  Patient will complete concrete and abstract categorization tasks to 90% accuracy to facilitate improved generative naming skills (to be achieved in 8-10 weeks). -- PARTIALLY MET/ONGOING  -Generative naming: Patient instructed to name 3 words for a given category (cards taken from the game \"5 Second Rule\"). Task completed across 10 trials -- 23/30 opp (77% acc) independently, increasing to 30/30 opp (100% acc) with min-mod semantic cues.     Patient will complete word generation tasks (e.g., verbal fluency, categorization, analogies, synonyms/antonyms, etc.) with 80% accuracy using word finding strategies to facilitate improved word retrieval skills (to be achieved in 8-10 weeks). -- PARTIALLY MET/ONGOING  -Language organization (HEP review and therapy completion with cues from " clinician): Patient was presented with 5-7 words and asked to rearrange the words to form a logical sentence. It should be noted that the first word was capitalized. Task completed in 17/30 opp (57% acc) independently, increasing to 30/30 opp (100% acc) with min-mod verbal/visual cues.    Patient will name up to 5 features to describe a person/place/thing with 80% accuracy to facilitate improved utilization of circumlocution strategy (to be achieved in 8-10 weeks). -- PARTIALLY MET/ONGOING    Patient will answer complex verbal yes/no questions with 80% accuracy to facilitate increased auditory comprehension skills (to be achieved in 8-10 weeks). -- PARTIALLY MET/ONGOING    Patient will follow multistep verbal directions with 80% accuracy to facilitate increased auditory comprehension skills (to be achieved in 8-10 weeks). -- PARTIALLY MET/ONGOING    Plan:  -Patient was provided with home exercises/activities to target goals in plan of care at the end of today's session.  -Continue with current plan of care.

## 2024-11-04 NOTE — ASSESSMENT & PLAN NOTE
Elevated BP in office today as well as elevated BP's at home. Pt reports hx of htn in the past that he was on medication for. Reports he has been taking old losartan 50 mg daily at home due to elevated BP's. Has a hx of CVA of left basal ganglia May 2024. Goal BP <130/80 given hx of cva. Will rx losartan 50 mg as this is what he reports he was previously taking. Will order BMP. Will follow up in 1 week with BP log and blood pressure cuff to check vs office bp cuff. ED precautions reviewed.         Orders:    losartan (COZAAR) 50 mg tablet; Take 1 tablet (50 mg total) by mouth daily    Basic metabolic panel; Future

## 2024-11-04 NOTE — PATIENT INSTRUCTIONS
Bring blood pressure log and blood pressure cuff to next visit.   Complete blood work prior to next visit   Call optometrist to discuss price of eye exam ([facility], [facility], [employer], [facility]

## 2024-11-06 ENCOUNTER — OFFICE VISIT (OUTPATIENT)
Dept: SPEECH THERAPY | Facility: CLINIC | Age: 49
End: 2024-11-06

## 2024-11-06 DIAGNOSIS — I69.920 APHASIA, LATE EFFECT OF CEREBROVASCULAR DISEASE: Primary | ICD-10-CM

## 2024-11-06 DIAGNOSIS — I63.81 CEREBROVASCULAR ACCIDENT (CVA) OF LEFT BASAL GANGLIA (HCC): ICD-10-CM

## 2024-11-06 PROCEDURE — 92507 TX SP LANG VOICE COMM INDIV: CPT | Performed by: SPEECH-LANGUAGE PATHOLOGIST

## 2024-11-07 ENCOUNTER — APPOINTMENT (OUTPATIENT)
Dept: LAB | Facility: CLINIC | Age: 49
End: 2024-11-07

## 2024-11-07 DIAGNOSIS — I10 PRIMARY HYPERTENSION: ICD-10-CM

## 2024-11-07 LAB
ANION GAP SERPL CALCULATED.3IONS-SCNC: 11 MMOL/L (ref 4–13)
BUN SERPL-MCNC: 10 MG/DL (ref 5–25)
CALCIUM SERPL-MCNC: 9.8 MG/DL (ref 8.4–10.2)
CHLORIDE SERPL-SCNC: 101 MMOL/L (ref 96–108)
CO2 SERPL-SCNC: 28 MMOL/L (ref 21–32)
CREAT SERPL-MCNC: 1 MG/DL (ref 0.6–1.3)
GFR SERPL CREATININE-BSD FRML MDRD: 87 ML/MIN/1.73SQ M
GLUCOSE P FAST SERPL-MCNC: 106 MG/DL (ref 65–99)
POTASSIUM SERPL-SCNC: 4.8 MMOL/L (ref 3.5–5.3)
SODIUM SERPL-SCNC: 140 MMOL/L (ref 135–147)

## 2024-11-07 PROCEDURE — 36415 COLL VENOUS BLD VENIPUNCTURE: CPT

## 2024-11-07 PROCEDURE — 80048 BASIC METABOLIC PNL TOTAL CA: CPT

## 2024-11-11 ENCOUNTER — APPOINTMENT (OUTPATIENT)
Dept: SPEECH THERAPY | Facility: CLINIC | Age: 49
End: 2024-11-11

## 2024-11-11 ENCOUNTER — OFFICE VISIT (OUTPATIENT)
Dept: FAMILY MEDICINE CLINIC | Facility: CLINIC | Age: 49
End: 2024-11-11

## 2024-11-11 VITALS
DIASTOLIC BLOOD PRESSURE: 89 MMHG | BODY MASS INDEX: 26.66 KG/M2 | SYSTOLIC BLOOD PRESSURE: 157 MMHG | RESPIRATION RATE: 18 BRPM | HEART RATE: 62 BPM | WEIGHT: 165.2 LBS | TEMPERATURE: 97.3 F | OXYGEN SATURATION: 98 %

## 2024-11-11 DIAGNOSIS — I63.81 CEREBROVASCULAR ACCIDENT (CVA) OF LEFT BASAL GANGLIA (HCC): ICD-10-CM

## 2024-11-11 DIAGNOSIS — I10 PRIMARY HYPERTENSION: Primary | ICD-10-CM

## 2024-11-11 DIAGNOSIS — I69.920 APHASIA, LATE EFFECT OF CEREBROVASCULAR DISEASE: Primary | ICD-10-CM

## 2024-11-11 PROCEDURE — 99213 OFFICE O/P EST LOW 20 MIN: CPT | Performed by: FAMILY MEDICINE

## 2024-11-11 RX ORDER — ATORVASTATIN CALCIUM 80 MG/1
80 TABLET, FILM COATED ORAL EVERY EVENING
Qty: 90 TABLET | Refills: 0 | Status: SHIPPED | OUTPATIENT
Start: 2024-11-11 | End: 2025-05-10

## 2024-11-11 RX ORDER — LOSARTAN POTASSIUM 50 MG/1
100 TABLET ORAL DAILY
Start: 2024-11-11 | End: 2024-11-18

## 2024-11-11 NOTE — PATIENT INSTRUCTIONS
Increase Losartan (cozaar) to 100 mg. Take two (2) of the 50 mg losartan a day   Keep daily Blood pressure log   Take Tylenol and ibuprofen as needed for shoulder pain.   Try topicals such as lidocaine patch, Voltaren gel, or Icy hot as needed for shoulder pain.

## 2024-11-11 NOTE — ASSESSMENT & PLAN NOTE
Orders:    atorvastatin (LIPITOR) 80 mg tablet; Take 1 tablet (80 mg total) by mouth every evening

## 2024-11-11 NOTE — PROGRESS NOTES
Daily Speech Treatment Note    Today's date: 11/10/2024 ***  Patient’s name: Galdino Gaines  : 1975  MRN: [id number]  Safety measures: h/o CVA  Referring provider: Evy Alvarez MD    Encounter Diagnosis     ICD-10-CM    1. Aphasia, late effect of cerebrovascular disease  I69.920       2. Cerebrovascular accident (CVA) of left basal ganglia (HCC)  I63.81         Visit Tracking:  ***    Subjective/Behavioral:  -***    Objective/Assessment:  -Reviewed patient's home exercises/activities completed since last appointment. ***    Short-term goals:  Patient will complete concrete and abstract categorization tasks to 90% accuracy to facilitate improved generative naming skills (to be achieved in 8-10 weeks). -- PARTIALLY MET/ONGOING    Patient will complete word generation tasks (e.g., verbal fluency, categorization, analogies, synonyms/antonyms, etc.) with 80% accuracy using word finding strategies to facilitate improved word retrieval skills (to be achieved in 8-10 weeks). -- PARTIALLY MET/ONGOING    Patient will name up to 5 features to describe a person/place/thing with 80% accuracy to facilitate improved utilization of circumlocution strategy (to be achieved in 8-10 weeks). -- PARTIALLY MET/ONGOING    Patient will answer complex verbal yes/no questions with 80% accuracy to facilitate increased auditory comprehension skills (to be achieved in 8-10 weeks). -- PARTIALLY MET/ONGOING    Patient will follow multistep verbal directions with 80% accuracy to facilitate increased auditory comprehension skills (to be achieved in 8-10 weeks). -- PARTIALLY MET/ONGOING    Plan:  -Patient was provided with home exercises/activities to target goals in plan of care at the end of today's session.  -Continue with current plan of care.

## 2024-11-11 NOTE — ASSESSMENT & PLAN NOTE
Elevated BP in office today as well as elevated BP's at home. Pt reports hx of htn in the past that he was on medication for. Reports he has been taking old losartan 50 mg daily at home due to elevated BP's. Has a hx of CVA of left basal ganglia May 2024. Goal BP <130/80 given hx of cva. Will rx losartan 50 mg as this is what he reports he was previously taking. Will order BMP. Will follow up in 1 week with BP log and blood pressure cuff to check vs office bp cuff. ED precautions reviewed.         Orders:    losartan (COZAAR) 50 mg tablet; Take 2 tablets (100 mg total) by mouth daily

## 2024-11-11 NOTE — PROGRESS NOTES
Daily Speech Treatment Note    Today's date: 2024   Patient’s name: Galdino Gaines  : 1975  MRN: [id number]  Safety measures: h/o CVA  Referring provider: Evy Alvarez MD    Encounter Diagnosis     ICD-10-CM    1. Aphasia, late effect of cerebrovascular disease  I69.920       2. Cerebrovascular accident (CVA) of left basal ganglia (HCC)  I63.81         Visit Tracking:  POC   Expires Auth Expiration Date ST Visit Limit   2024 CC approved until 24 BOMN              Visit/Unit Tracking:  Auth Status Date 10/14/24 10/16/24 10/21/24 10/23/24 10/28/24 10/30/24 11/06/24 11/13/24 11/20/24   CC approved until 24 Used 35 36 37 38 39 40 41 42 43     Remaining BOMN BOMN BOMN BOMN BOMN BOMN BOMN BOMN BOMN     Subjective/Behavioral:  -Patient reported that his appointment with his PCP went well on Monday. Patient noted that his blood pressure is under better control. Patient stated that he was told to continue with Losartan 100 mg daily.     Objective/Assessment:  -Reviewed patient's home exercises/activities completed since last appointment. Modifying sentence incongruities completed in 7/10 opp. Completing sentence analogies completed in 7/10 opp. Patient achieved 100% acc provided min-mod verbal cues from clinician at start of session during review.    Short-term goals:  Patient will complete concrete and abstract categorization tasks to 90% accuracy to facilitate improved generative naming skills (to be achieved in 8-10 weeks). -- PARTIALLY MET/ONGOING    Patient will complete word generation tasks (e.g., verbal fluency, categorization, analogies, synonyms/antonyms, etc.) with 80% accuracy using word finding strategies to facilitate improved word retrieval skills (to be achieved in 8-10 weeks). -- PARTIALLY MET/ONGOING  -Word deduction: Patient was provided with four clue words and asked to name what was being described. Task completed in  opp (36% acc) independently, increasing to  22/25 opp (88% acc) with min-mod semantic cues, increasing to 24/25 opp (96% acc) with additional min phonemic cues.     Patient will name up to 5 features to describe a person/place/thing with 80% accuracy to facilitate improved utilization of circumlocution strategy (to be achieved in 8-10 weeks). -- PARTIALLY MET/ONGOING    Patient will answer complex verbal yes/no questions with 80% accuracy to facilitate increased auditory comprehension skills (to be achieved in 8-10 weeks). -- PARTIALLY MET/ONGOING    Patient will follow multistep verbal directions with 80% accuracy to facilitate increased auditory comprehension skills (to be achieved in 8-10 weeks). -- PARTIALLY MET/ONGOING    Plan:  -Patient was provided with home exercises/activities to target goals in plan of care at the end of today's session.  -Continue with current plan of care.

## 2024-11-11 NOTE — PROGRESS NOTES
Ambulatory Visit  Name: Galdino Gaines      : 1975      MRN: [id number]  Encounter Provider: Radha Platt MD  Encounter Date: 2024   Encounter department: [facility]    Assessment & Plan  Primary hypertension  Elevated BP in office today as well as elevated BP's at home. Pt reports hx of htn in the past that he was on medication for. Reports he has been taking old losartan 50 mg daily at home due to elevated BP's. Has a hx of CVA of left basal ganglia May 2024. Goal BP <130/80 given hx of cva. Will rx losartan 50 mg as this is what he reports he was previously taking. Will order BMP. Will follow up in 1 week with BP log and blood pressure cuff to check vs office bp cuff. ED precautions reviewed.         Orders:    losartan (COZAAR) 50 mg tablet; Take 2 tablets (100 mg total) by mouth daily      Cerebrovascular accident (CVA) of left basal ganglia (HCC)    Orders:    atorvastatin (LIPITOR) 80 mg tablet; Take 1 tablet (80 mg total) by mouth every evening       History of Present Illness     Mr. Gaines presents to clinic for follow up of HTN. He states he has been compliant with losartan. Denies side effects. Denies symptomatic hypotension. Denies signs or symptoms of HTN emergency.         History obtained from : patient  Review of Systems   Constitutional:  Negative for chills and fever.   HENT:  Negative for rhinorrhea and sore throat.    Eyes:  Negative for redness and visual disturbance.   Respiratory:  Negative for cough and shortness of breath.    Cardiovascular:  Negative for chest pain and palpitations.   Gastrointestinal:  Negative for abdominal pain, constipation and nausea.   Genitourinary:  Negative for difficulty urinating and dysuria.   Musculoskeletal:  Negative for arthralgias and myalgias.   Skin:  Negative for rash.   Allergic/Immunologic: Negative for environmental allergies and food allergies.   Neurological:  Negative for  dizziness and headaches.           Objective     /89   Pulse 62   Temp (!) 97.3 °F (36.3 °C)   Resp 18   Wt 74.9 kg (165 lb 3.2 oz)   SpO2 98%   BMI 26.66 kg/m²     Physical Exam  Vitals reviewed.   Constitutional:       General: He is not in acute distress.     Appearance: Normal appearance. He is not ill-appearing.   HENT:      Head: Normocephalic and atraumatic.   Cardiovascular:      Rate and Rhythm: Normal rate and regular rhythm.      Pulses: Normal pulses.      Heart sounds: Normal heart sounds. No murmur heard.  Pulmonary:      Effort: Pulmonary effort is normal. No respiratory distress.      Breath sounds: Normal breath sounds.   Abdominal:      General: Bowel sounds are normal. There is no distension.      Palpations: Abdomen is soft.   Musculoskeletal:         General: Normal range of motion.      Cervical back: Normal range of motion.      Right lower leg: No edema.      Left lower leg: No edema.   Skin:     General: Skin is warm.   Neurological:      Mental Status: He is alert and oriented to person, place, and time.

## 2024-11-13 ENCOUNTER — OFFICE VISIT (OUTPATIENT)
Dept: SPEECH THERAPY | Facility: CLINIC | Age: 49
End: 2024-11-13

## 2024-11-13 DIAGNOSIS — I69.920 APHASIA, LATE EFFECT OF CEREBROVASCULAR DISEASE: Primary | ICD-10-CM

## 2024-11-13 DIAGNOSIS — I63.81 CEREBROVASCULAR ACCIDENT (CVA) OF LEFT BASAL GANGLIA (HCC): ICD-10-CM

## 2024-11-13 PROCEDURE — 92507 TX SP LANG VOICE COMM INDIV: CPT | Performed by: SPEECH-LANGUAGE PATHOLOGIST

## 2024-11-17 NOTE — PROGRESS NOTES
Daily Speech Treatment Note    Today's date: 2024  Patient’s name: Galdino Gaines  : 1975  MRN: [id number]  Safety measures: h/o CVA  Referring provider: Evy Alvarez MD    Encounter Diagnosis     ICD-10-CM    1. Aphasia, late effect of cerebrovascular disease  I69.920       2. Cerebrovascular accident (CVA) of left basal ganglia (HCC)  I63.81         Visit Tracking:  POC   Expires Auth Expiration Date ST Visit Limit   2024 CC approved until 24 BOMN              Visit/Unit Tracking:  Auth Status Date 10/14/24 10/16/24 10/21/24 10/23/24 10/28/24 10/30/24 11/06/24 11/13/24 11/20/24 11/27/24   CC approved until 24 Used 35 36 37 38 39 40 41 42 43 44     Remaining BOMN BOMN BOMN BOMN BOMN BOMN BOMN BOMN BOMN BOMN     Subjective/Behavioral:  -Patient reported that he overslept on Monday, which is the reason why he did not show up for his appointment.    Objective/Assessment:  -Reviewed patient's home exercises/activities completed since last appointment. Modifying sentence incongruities completed in 9/10 opp. Completing sentence analogies completed in 12/15 opp.       Short-term goals:  Patient will complete concrete and abstract categorization tasks to 90% accuracy to facilitate improved generative naming skills (to be achieved in 8-10 weeks). -- PARTIALLY MET/ONGOING    Patient will complete word generation tasks (e.g., verbal fluency, categorization, analogies, synonyms/antonyms, etc.) with 80% accuracy using word finding strategies to facilitate improved word retrieval skills (to be achieved in 8-10 weeks). -- PARTIALLY MET/ONGOING  -Additional items: Patient was presented with questions and was asked to solve (e.g., “In addition to shoes, what can be polished?”). Task completed in  opp (55% acc) independently, increasing to  opp (80% acc) with min semantic cues, increasing to  opp (100% acc) with additional min phonemic cues.     Patient will name up to 5 features  to describe a person/place/thing with 80% accuracy to facilitate improved utilization of circumlocution strategy (to be achieved in 8-10 weeks). -- PARTIALLY MET/ONGOING    Patient will answer complex verbal yes/no questions with 80% accuracy to facilitate increased auditory comprehension skills (to be achieved in 8-10 weeks). -- PARTIALLY MET/ONGOING    Patient will follow multistep verbal directions with 80% accuracy to facilitate increased auditory comprehension skills (to be achieved in 8-10 weeks). -- PARTIALLY MET/ONGOING  -Therapy task was presented aloud today, which incorporated receptive language goals. Patient benefited from verbal repetition from clinician as needed. Patient was also noted to repeat clinician as a compensatory strategy (i.e., facilitate increased processing of clues).    Plan:  -Patient was provided with home exercises/activities to target goals in plan of care at the end of today's session.  -Continue with current plan of care.

## 2024-11-18 ENCOUNTER — OFFICE VISIT (OUTPATIENT)
Dept: FAMILY MEDICINE CLINIC | Facility: CLINIC | Age: 49
End: 2024-11-18

## 2024-11-18 ENCOUNTER — APPOINTMENT (OUTPATIENT)
Dept: SPEECH THERAPY | Facility: CLINIC | Age: 49
End: 2024-11-18

## 2024-11-18 VITALS
WEIGHT: 167 LBS | TEMPERATURE: 98 F | HEIGHT: 66 IN | BODY MASS INDEX: 26.84 KG/M2 | RESPIRATION RATE: 20 BRPM | DIASTOLIC BLOOD PRESSURE: 76 MMHG | OXYGEN SATURATION: 99 % | SYSTOLIC BLOOD PRESSURE: 138 MMHG | HEART RATE: 75 BPM

## 2024-11-18 DIAGNOSIS — I10 PRIMARY HYPERTENSION: Primary | ICD-10-CM

## 2024-11-18 DIAGNOSIS — H53.9 VISION CHANGES: ICD-10-CM

## 2024-11-18 DIAGNOSIS — Z01.00 EYE EXAM, ROUTINE: ICD-10-CM

## 2024-11-18 DIAGNOSIS — I69.920 APHASIA, LATE EFFECT OF CEREBROVASCULAR DISEASE: Primary | ICD-10-CM

## 2024-11-18 DIAGNOSIS — I63.81 CEREBROVASCULAR ACCIDENT (CVA) OF LEFT BASAL GANGLIA (HCC): ICD-10-CM

## 2024-11-18 PROCEDURE — 99213 OFFICE O/P EST LOW 20 MIN: CPT | Performed by: FAMILY MEDICINE

## 2024-11-18 RX ORDER — LOSARTAN POTASSIUM 100 MG/1
100 TABLET ORAL DAILY
Qty: 90 TABLET | Refills: 0 | Status: SHIPPED | OUTPATIENT
Start: 2024-11-18 | End: 2025-02-16

## 2024-11-19 NOTE — ASSESSMENT & PLAN NOTE
Well controlled based off home BP log with 90% of bp at goal BP <130/80 given hx of CVA of left basal ganglia may 2024. Will continue losartan 100 mg daily. Reviewed diet and exercise recommendations.  ED precautions reviewed. Follow up in 3 months. Refill of medication sent.  Orders:    losartan (COZAAR) 100 MG tablet; Take 1 tablet (100 mg total) by mouth daily

## 2024-11-19 NOTE — PROGRESS NOTES
Name: Galdino Gaines      : 1975      MRN: [id number]  Encounter Provider: Radha Platt MD  Encounter Date: 2024   Encounter department: [facility] [place]  :  Assessment & Plan  Primary hypertension  Well controlled based off home BP log with 90% of bp at goal BP <130/80 given hx of CVA of left basal ganglia may 2024. Will continue losartan 100 mg daily. Reviewed diet and exercise recommendations.  ED precautions reviewed. Follow up in 3 months. Refill of medication sent.  Orders:    losartan (COZAAR) 100 MG tablet; Take 1 tablet (100 mg total) by mouth daily    Eye exam, routine  Patient reports it has been several years since last eye exam and he has noticed changes in his vision when at a distance. Previously discussed seeing optometrist. Pt request to see ophthalmology. Referral placed for routine eye exam for updated glasses prescription.   Orders:    Ambulatory Referral to Ophthalmology; Future    Vision changes    Orders:    Ambulatory Referral to Ophthalmology; Future           History of Present Illness     Mr. Gaines presents to clinic for HTN follow up. He states he is tolerating losartan without side effects. Denies signs or symptoms of hypotension. Denies signs or symptom os hypertensive emergency. Has BP cuff at home.      Review of Systems   Constitutional:  Negative for chills and fever.   HENT:  Negative for rhinorrhea and sore throat.    Eyes:  Negative for redness and visual disturbance.   Respiratory:  Negative for cough and shortness of breath.    Cardiovascular:  Negative for chest pain and palpitations.   Gastrointestinal:  Negative for abdominal pain, constipation and nausea.   Genitourinary:  Negative for difficulty urinating and dysuria.   Musculoskeletal:  Negative for arthralgias and myalgias.   Skin:  Negative for rash.   Allergic/Immunologic: Negative for environmental allergies and food allergies.   Neurological:  Negative  "for dizziness and headaches.          Objective   /76   Pulse 75   Temp 98 °F (36.7 °C) (Temporal)   Resp 20   Ht 5' 6\" (1.676 m)   Wt 75.8 kg (167 lb)   SpO2 99%   BMI 26.95 kg/m²      Physical Exam  Vitals reviewed.   Constitutional:       General: He is not in acute distress.     Appearance: Normal appearance. He is well-developed. He is not ill-appearing.   HENT:      Head: Normocephalic and atraumatic.   Eyes:      Conjunctiva/sclera: Conjunctivae normal.   Cardiovascular:      Rate and Rhythm: Normal rate and regular rhythm.      Heart sounds: No murmur heard.  Pulmonary:      Effort: Pulmonary effort is normal. No respiratory distress.      Breath sounds: Normal breath sounds.   Abdominal:      General: There is no distension.      Palpations: Abdomen is soft.      Tenderness: There is no abdominal tenderness.   Musculoskeletal:      Cervical back: Normal range of motion.      Right lower leg: No edema.      Left lower leg: No edema.   Skin:     General: Skin is warm and dry.   Neurological:      Mental Status: He is alert and oriented to person, place, and time.   Psychiatric:         Mood and Affect: Mood normal.       "

## 2024-11-20 ENCOUNTER — OFFICE VISIT (OUTPATIENT)
Dept: SPEECH THERAPY | Facility: CLINIC | Age: 49
End: 2024-11-20

## 2024-11-20 DIAGNOSIS — I63.81 CEREBROVASCULAR ACCIDENT (CVA) OF LEFT BASAL GANGLIA (HCC): ICD-10-CM

## 2024-11-20 DIAGNOSIS — I69.920 APHASIA, LATE EFFECT OF CEREBROVASCULAR DISEASE: Primary | ICD-10-CM

## 2024-11-20 PROCEDURE — 92507 TX SP LANG VOICE COMM INDIV: CPT | Performed by: SPEECH-LANGUAGE PATHOLOGIST

## 2024-11-25 ENCOUNTER — TELEPHONE (OUTPATIENT)
Dept: SPEECH THERAPY | Facility: CLINIC | Age: 49
End: 2024-11-25

## 2024-11-25 NOTE — TELEPHONE ENCOUNTER
Patient did not show for his 11:30am outpatient Speech Therapy appointment. Clinician attempted to contact patient via telephone. Clinician left a message on patient's voicemail requesting a call back to reschedule today's appointment.

## 2024-11-25 NOTE — PROGRESS NOTES
Daily Speech Treatment Note    Today's date: 2024   Patient’s name: Galdino Gaines  : 1975  MRN: [id number]  Safety measures: h/o CVA  Referring provider: Evy Alvarez MD    Encounter Diagnosis     ICD-10-CM    1. Aphasia, late effect of cerebrovascular disease  I69.920       2. Cerebrovascular accident (CVA) of left basal ganglia (HCC)  I63.81         Visit Tracking:  POC   Expires Auth Expiration Date ST Visit Limit   2024 CC approved until 24 BOMN              Visit/Unit Tracking:  Auth Status Date 24            CC approved until 24 Used 45              Remaining BOMN BOMN BOMN BOMN BOMN BOMN BOMN BOMN BOMN BOMN     Subjective/Behavioral:  -Patient with no concerns today.    Objective/Assessment:  -Reviewed patient's home exercises/activities completed since last appointment.      Short-term goals:  Patient will complete concrete and abstract categorization tasks to 90% accuracy to facilitate improved generative naming skills (to be achieved in 8-10 weeks). -- PARTIALLY MET/ONGOING    Patient will complete word generation tasks (e.g., verbal fluency, categorization, analogies, synonyms/antonyms, etc.) with 80% accuracy using word finding strategies to facilitate improved word retrieval skills (to be achieved in 8-10 weeks). -- PARTIALLY MET/ONGOING    Patient will name up to 5 features to describe a person/place/thing with 80% accuracy to facilitate improved utilization of circumlocution strategy (to be achieved in 8-10 weeks). -- PARTIALLY MET/ONGOING  -Verbal expression: Patient was presented with questions pertaining to personal opinions and probable outcomes. Patient was asked to supply answers in full sentences. Patient completed task with mild difficulty with anomia and organization of answers at times. He benefited from additional language processing time. Patient was noted to utilize higher-level vocabulary words intermittently during exercise.      Patient will  "answer complex verbal yes/no questions with 80% accuracy to facilitate increased auditory comprehension skills (to be achieved in 8-10 weeks). -- PARTIALLY MET/ONGOING  -Auditory comprehension/processing: Patient was presented with statements containing \"complex vocabulary\" and asked to respond yes/no (e.g., \"If a a warranty is , is it valid?\" -- NO). Task completed in 11/15 opp (73% acc) independently, increasing to 12/15 opp (80% acc) with mod verbal repetition cues.      Patient will follow multistep verbal directions with 80% accuracy to facilitate increased auditory comprehension skills (to be achieved in 8-10 weeks). -- PARTIALLY MET/ONGOING    Plan:  -Patient was provided with home exercises/activities to target goals in plan of care at the end of today's session.  -Continue with current plan of care.  "

## 2024-11-26 ENCOUNTER — OFFICE VISIT (OUTPATIENT)
Dept: NEUROLOGY | Facility: CLINIC | Age: 49
End: 2024-11-26

## 2024-11-26 VITALS — TEMPERATURE: 97.8 F | BODY MASS INDEX: 26.94 KG/M2 | WEIGHT: 166.9 LBS

## 2024-11-26 DIAGNOSIS — I63.81 CEREBROVASCULAR ACCIDENT (CVA) OF LEFT BASAL GANGLIA (HCC): Primary | ICD-10-CM

## 2024-11-26 PROCEDURE — 99214 OFFICE O/P EST MOD 30 MIN: CPT | Performed by: PHYSICIAN ASSISTANT

## 2024-11-26 NOTE — PATIENT INSTRUCTIONS
Cerebrovascular accident (CVA) of left basal ganglia (HCC)  Pt denies any symptoms to suggest new stroke.  Pt should continue Aspirin and atorvastatin for secondary stroke prevention.  Continue with good blood pressure control; I would recommend monitoring at home at least 3 times per week; Goal of <130/80; at goal  Continue with good cholesterol control; Goal LDL <70; at goal  Continue with good blood sugar control; Goal HgbA1c <7.0; at goal  Pt was encouraged to get regular exercise and follow a Mediterranean diet.  If pt has any new stroke-like symptoms including sudden painless loss of vision or double vision, difficulty speaking or swallowing, vertigo / room spinning that does not quickly resolve, or weakness / numbness affecting 1 side of the face or body he should proceed by ambulance to the nearest emergency room immediately.  Pt will follow-up in 12 months.

## 2024-11-26 NOTE — PROGRESS NOTES
Name: Galdino Gaines      : 1975      MRN: [id number]  Encounter Provider: Carmela Hodgson PA-C  Encounter Date: 2024   Encounter department: [facility] [place]    :  Assessment & Plan  Cerebrovascular accident (CVA) of left basal ganglia (HCC)  Pt denies any symptoms to suggest new stroke.  Pt should continue Aspirin and atorvastatin for secondary stroke prevention.  Continue with good blood pressure control; I would recommend monitoring at home at least 3 times per week; Goal of <130/80; at goal  Continue with good cholesterol control; Goal LDL <70; at goal  Continue with good blood sugar control; Goal HgbA1c <7.0; at goal  Pt was encouraged to get regular exercise and follow a Mediterranean diet.  If pt has any new stroke-like symptoms including sudden painless loss of vision or double vision, difficulty speaking or swallowing, vertigo / room spinning that does not quickly resolve, or weakness / numbness affecting 1 side of the face or body he should proceed by ambulance to the nearest emergency room immediately.  Pt will follow-up in 12 months.           History of Present Illness     Galdino Gaines is a right handed 48 y.o. male, with HTN, who follows in the office due to a history of stroke. He is taking Aspirin and atorvastatin. He tolerates the medications without difficulty. He has completed SLP and is following a home exercise program. He believes that he is having an easier time finding words. He will occasionally have hiccups and will take thorazine. He checks his BP at home and it is <130/80. LDL is a bit above goal.    Lipid panel: . LDL 88    I have personally reviewed the MA's review of systems and made changes as necessary.    Medical History Reviewed by provider this encounter:  Tobacco  Allergies  Meds  Problems  Med Hx  Surg Hx  Fam Hx     .     Objective   Temp 97.8 °F (36.6 °C)   Wt 75.7 kg (166 lb 14.4 oz)   BMI 26.94 kg/m²      Physical Exam  Vitals reviewed.   Constitutional:       Appearance: Normal appearance.   HENT:      Head: Normocephalic and atraumatic.      Nose: Nose normal.      Mouth/Throat:      Mouth: Mucous membranes are moist.   Eyes:      Extraocular Movements: Extraocular movements intact.      Conjunctiva/sclera: Conjunctivae normal.      Pupils: Pupils are equal, round, and reactive to light.   Pulmonary:      Effort: Pulmonary effort is normal.   Neurological:      Mental Status: He is alert and oriented to person, place, and time.      Motor: Motor strength is normal.     Gait: Gait is intact.      Deep Tendon Reflexes:      Reflex Scores:       Tricep reflexes are 2+ on the right side and 2+ on the left side.       Bicep reflexes are 2+ on the right side and 2+ on the left side.       Brachioradialis reflexes are 2+ on the right side and 2+ on the left side.       Patellar reflexes are 2+ on the right side and 2+ on the left side.       Achilles reflexes are 2+ on the right side and 2+ on the left side.  Psychiatric:         Speech: Speech normal.       Neurologic Exam     Mental Status   Oriented to person, place, and time.   Attention: normal. Concentration: normal.   Speech: speech is normal   Level of consciousness: alert  Knowledge: good.   Normal comprehension.     Cranial Nerves     CN III, IV, VI   Pupils are equal, round, and reactive to light.  Right pupil: Shape: regular. Reactivity: brisk. Consensual response: intact. Accommodation: intact.   Left pupil: Shape: regular. Reactivity: brisk. Consensual response: intact. Accommodation: intact.   CN III: no CN III palsy  CN VI: no CN VI palsy  Nystagmus: none   Ophthalmoparesis: none  Upgaze: normal  Downgaze: normal  Conjugate gaze: present    CN V   Facial sensation intact.     CN VII   Facial expression full, symmetric.     CN IX, X   CN IX normal.     CN XI   CN XI normal.     CN XII   CN XII normal.     Motor Exam   Muscle bulk: normal  Overall muscle  tone: normal    Strength   Strength 5/5 throughout.     Gait, Coordination, and Reflexes     Gait  Gait: normal    Reflexes   Right brachioradialis: 2+  Left brachioradialis: 2+  Right biceps: 2+  Left biceps: 2+  Right triceps: 2+  Left triceps: 2+  Right patellar: 2+  Left patellar: 2+  Right achilles: 2+  Left achilles: 2+      Results/Data:     SL AMB Radiology Results Review : No pertinent imaging studies reviewed.    Administrative Statements   I have spent a total time of 30 minutes in caring for this patient on the day of the visit/encounter including Prognosis, Risks and benefits of tx options, Instructions for management, Patient and family education, Importance of tx compliance, Risk factor reductions, Impressions, Counseling / Coordination of care, Documenting in the medical record, Reviewing / ordering tests, medicine, procedures  , and Obtaining or reviewing history  .

## 2024-11-26 NOTE — PROGRESS NOTES
Review of Systems   Constitutional:  Negative for appetite change, fatigue and fever.   HENT: Negative.  Negative for hearing loss, tinnitus, trouble swallowing and voice change.    Eyes: Negative.  Negative for photophobia, pain and visual disturbance.   Respiratory: Negative.  Negative for shortness of breath.    Cardiovascular: Negative.  Negative for palpitations.   Gastrointestinal: Negative.  Negative for nausea and vomiting.   Endocrine: Negative.  Negative for cold intolerance.   Genitourinary: Negative.  Negative for dysuria, frequency and urgency.   Musculoskeletal: Negative.  Negative for back pain, gait problem, myalgias, neck pain and neck stiffness.   Skin: Negative.  Negative for rash.   Allergic/Immunologic: Negative.    Neurological: Negative.  Negative for dizziness, tremors, seizures, syncope, facial asymmetry, speech difficulty, weakness, light-headedness, numbness and headaches.   Hematological: Negative.  Does not bruise/bleed easily.   Psychiatric/Behavioral: Negative.  Negative for confusion, hallucinations and sleep disturbance.    All other systems reviewed and are negative.

## 2024-11-26 NOTE — ASSESSMENT & PLAN NOTE
Pt denies any symptoms to suggest new stroke.  Pt should continue Aspirin and atorvastatin for secondary stroke prevention.  Continue with good blood pressure control; I would recommend monitoring at home at least 3 times per week; Goal of <130/80; at goal  Continue with good cholesterol control; Goal LDL <70; at goal  Continue with good blood sugar control; Goal HgbA1c <7.0; at goal  Pt was encouraged to get regular exercise and follow a Mediterranean diet.  If pt has any new stroke-like symptoms including sudden painless loss of vision or double vision, difficulty speaking or swallowing, vertigo / room spinning that does not quickly resolve, or weakness / numbness affecting 1 side of the face or body he should proceed by ambulance to the nearest emergency room immediately.  Pt will follow-up in 12 months.

## 2024-11-27 ENCOUNTER — OFFICE VISIT (OUTPATIENT)
Dept: SPEECH THERAPY | Facility: CLINIC | Age: 49
End: 2024-11-27

## 2024-11-27 DIAGNOSIS — I69.920 APHASIA, LATE EFFECT OF CEREBROVASCULAR DISEASE: Primary | ICD-10-CM

## 2024-11-27 DIAGNOSIS — I63.81 CEREBROVASCULAR ACCIDENT (CVA) OF LEFT BASAL GANGLIA (HCC): ICD-10-CM

## 2024-11-27 PROCEDURE — 92507 TX SP LANG VOICE COMM INDIV: CPT | Performed by: SPEECH-LANGUAGE PATHOLOGIST

## 2024-12-02 ENCOUNTER — TELEPHONE (OUTPATIENT)
Dept: SPEECH THERAPY | Facility: CLINIC | Age: 49
End: 2024-12-02

## 2024-12-02 NOTE — TELEPHONE ENCOUNTER
Patient did not show for his 12:15pm outpatient Speech Therapy appointment. Clinician attempted to contact patient via telephone. Clinician left a message on patient's voicemail requesting a call back to reschedule today's appointment.

## 2024-12-02 NOTE — PROGRESS NOTES
Daily Speech Treatment Note    Today's date: 2024  Patient’s name: Galdino Gaines  : 1975  MRN: [id number]  Safety measures: h/o CVA  Referring provider: Evy Alvarez MD    Encounter Diagnosis     ICD-10-CM    1. Aphasia, late effect of cerebrovascular disease  I69.920       2. Cerebrovascular accident (CVA) of left basal ganglia (HCC)  I63.81         Visit Tracking:  POC   Expires Auth Expiration Date ST Visit Limit   2024 CC approved until 24 BOMN              Visit/Unit Tracking:  Auth Status Date 24                   CC approved until 24 Used 45 46                     Remaining BOMN BOMN              Subjective/Behavioral:  -Patient reported that he had a vision exam completed last week and is waiting for his new glasses to arrive.    Objective/Assessment:  -Reviewed patient's home exercises/activities completed since last appointment. Abstract categories worksheet completed with min assistance from clinician at the start of session x6 during review.    Short-term goals:  Patient will complete concrete and abstract categorization tasks to 90% accuracy to facilitate improved generative naming skills (to be achieved in 8-10 weeks). -- PARTIALLY MET/ONGOING  -Higher-level word finding/categorization: The game “[employer]” was utilized to target word finding/categorization. Patient was presented with a single letter of the alphabet and asked to generate a single word for 12 separate categories beginning with that letter -- no time constraint (e.g., letter “L” - fruit=LEMON, man's name=MARIBELL, city=[place]). On first trial, patient completed in  opp (58% acc) independently, increasing to  opp (100% acc) with mod semantic cues from clinician. Second trial started, but not completed in its entirety, during session. Rest of trials to be completed for HEP.    Patient will complete word generation tasks (e.g., verbal fluency, categorization, analogies,  synonyms/antonyms, etc.) with 80% accuracy using word finding strategies to facilitate improved word retrieval skills (to be achieved in 8-10 weeks). -- PARTIALLY MET/ONGOING    Patient will name up to 5 features to describe a person/place/thing with 80% accuracy to facilitate improved utilization of circumlocution strategy (to be achieved in 8-10 weeks). -- PARTIALLY MET/ONGOING    Patient will answer complex verbal yes/no questions with 80% accuracy to facilitate increased auditory comprehension skills (to be achieved in 8-10 weeks). -- PARTIALLY MET/ONGOING    Patient will follow multistep verbal directions with 80% accuracy to facilitate increased auditory comprehension skills (to be achieved in 8-10 weeks). -- PARTIALLY MET/ONGOING    Plan:  -Patient was provided with home exercises/activities to target goals in plan of care at the end of today's session.  -Continue with current plan of care.

## 2024-12-02 NOTE — PROGRESS NOTES
"Daily Speech Treatment Note    Today's date: 2024  Patient’s name: Galdino Gaines  : 1975  MRN: [id number]  Safety measures: h/o CVA  Referring provider: Evy Alvarez MD    Encounter Diagnosis     ICD-10-CM    1. Aphasia, late effect of cerebrovascular disease  I69.920       2. Cerebrovascular accident (CVA) of left basal ganglia (HCC)  I63.81         Visit Tracking:  POC   Expires Auth Expiration Date ST Visit Limit   2024 CC approved until 24 BOMN              Visit/Unit Tracking:  Auth Status Date 24                 CC approved until 24 Used 45 46 47                   Remaining BOMN BOMN BOMN            Subjective/Behavioral:  -Patient with no new concerns reported today.    -As indicated in his OP ST note from 10/02/2024, \"...It was reported that patient's [place] Medicaid terminated and that he was approved for the [facility] until 2024...\" Next week will be patient's last 2 outpatient speech therapy sessions. Clinician presented patient with a printout of an online flyer from the [facility]'s Speech-Language Pathology program. They reportedly offer in-house pro-dorota clinical services. It was reported that the last session ended in 2024, but the next session will begin in 2025. Patient was provided with contact information and instructed to call/email the Director of Clinical Education if he was interested in being placed on a wait list. Reciprocal comprehension verbally expressed.    Objective/Assessment:  -Reviewed patient's home exercises/activities completed since last appointment.      Short-term goals:  Patient will complete concrete and abstract categorization tasks to 90% accuracy to facilitate improved generative naming skills (to be achieved in 8-10 weeks). -- PARTIALLY MET/ONGOING  -Higher-level word finding/categorization: The game “[employer]” was utilized to target word finding/categorization. " Patient was presented with a single letter of the alphabet and asked to generate a single word for 12 separate categories beginning with that letter -- no time constraint (e.g., letter “L” - fruit=LEMON, man's name=MARIBELL, city=[place]). Task completed across 1 trial. Task completed in 7/12 opp (58% acc) independently, increasing to 12/12 opp (100% acc) with mod semantic cues.     Patient will complete word generation tasks (e.g., verbal fluency, categorization, analogies, synonyms/antonyms, etc.) with 80% accuracy using word finding strategies to facilitate improved word retrieval skills (to be achieved in 8-10 weeks). -- PARTIALLY MET/ONGOING  -Thought organization/word generation: Patient was presented with instructions to separate combined words with a clue (e.g., “two animals” [id number] = CAT DOG). Task completed in 5/6 opp (83% acc) independently, increasing to 6/6 opp (100% acc) with min semantic cues. Assistance provided with spelling x3.    Patient will name up to 5 features to describe a person/place/thing with 80% accuracy to facilitate improved utilization of circumlocution strategy (to be achieved in 8-10 weeks). -- PARTIALLY MET/ONGOING    Patient will answer complex verbal yes/no questions with 80% accuracy to facilitate increased auditory comprehension skills (to be achieved in 8-10 weeks). -- PARTIALLY MET/ONGOING    Patient will follow multistep verbal directions with 80% accuracy to facilitate increased auditory comprehension skills (to be achieved in 8-10 weeks). -- PARTIALLY MET/ONGOING    Plan:  -Patient was provided with home exercises/activities to target goals in plan of care at the end of today's session.  -Continue with current plan of care.

## 2024-12-04 ENCOUNTER — OFFICE VISIT (OUTPATIENT)
Dept: SPEECH THERAPY | Facility: CLINIC | Age: 49
End: 2024-12-04

## 2024-12-04 DIAGNOSIS — I69.920 APHASIA, LATE EFFECT OF CEREBROVASCULAR DISEASE: Primary | ICD-10-CM

## 2024-12-04 DIAGNOSIS — I63.81 CEREBROVASCULAR ACCIDENT (CVA) OF LEFT BASAL GANGLIA (HCC): ICD-10-CM

## 2024-12-04 PROCEDURE — 92507 TX SP LANG VOICE COMM INDIV: CPT | Performed by: SPEECH-LANGUAGE PATHOLOGIST

## 2024-12-09 ENCOUNTER — OFFICE VISIT (OUTPATIENT)
Dept: SPEECH THERAPY | Facility: CLINIC | Age: 49
End: 2024-12-09

## 2024-12-09 DIAGNOSIS — I69.920 APHASIA, LATE EFFECT OF CEREBROVASCULAR DISEASE: Primary | ICD-10-CM

## 2024-12-09 DIAGNOSIS — I63.81 CEREBROVASCULAR ACCIDENT (CVA) OF LEFT BASAL GANGLIA (HCC): ICD-10-CM

## 2024-12-09 PROCEDURE — 92507 TX SP LANG VOICE COMM INDIV: CPT | Performed by: SPEECH-LANGUAGE PATHOLOGIST

## 2024-12-09 NOTE — PROGRESS NOTES
"Speech-Language Pathology Re-Evaluation    Today's date: 2024  Patient’s name: Galdino Gaines  : 1975  MRN: [id number]  Safety measures: h/o CVA  Referring provider: Evy Alvarez MD    Encounter Diagnosis     ICD-10-CM    1. Aphasia, late effect of cerebrovascular disease  I69.920       2. Cerebrovascular accident (CVA) of left basal ganglia (HCC)  I63.81         Assessment:   Patient was presented with portions of the BDAE-short form during today's re-assessment. Scores were compared to those he obtained on 2024. (It should be noted that patient is bilingual (primary language is Twi--patient is from [facility]; he is also fluent in English). Testing scores should be interpreted with caution.) Overall, patient can discuss almost all everyday problems with little or no assistance. Reduction of speech and/or comprehension, however, makes conversation about certain material difficult. On testing, patient demonstrated an improvement in score with phrase length, grammatical form, articulatory agility, and auditory comprehension of complex ideational material. A decline in score was noted with responsive naming, oral sentence reading, and oral sentence comprehension. Patient lost some points with basic word discrimination, picture-word matching, oral word reading, and melodic line. In 9 areas, patient's score remained \"unchanged\"--he stayed within the 100%ile. Patient continues to present with mild-moderate (improving) aphasia s/p CVA.     Verbal expression deficits can be c/b difficulty with word retrieval and verbal fluency. Patient implements circumlocution strategy in an attempt to self-cue. Patient demonstrates awareness to his deficits. When encountering difficulty with independent retrieval, clinician notes that patient benefits from semantic and phonemic cues, as well as visual cues and increased language processing time. Verbal repetition remains intact. Patient with inconsistent difficulty " "with answering complex yes/no questions and following of commands, as well as reduced processing speed. Patient continues with echolalic speech. Clinician suspects that this is a compensatory strategy that patient implements to self-cue to increase comprehension. Verbal repetition and rephrasing benefit patient during communication breakdowns. Occasional difficulty with reading comprehension noted during therapy, as well as during testing today. Patient benefits from having additional time, as well as reading material a second time through, as compensatory strategies, as well as clinician assisting with guided reading as needed. Improvements with overall graphic expression and content. Some spelling difficulties and missing of articles with sentence formation remain; however, English is patient's second language (?if this is patient's baseline status). Patient's \"Communication Confidence Rating Scale for Aphasia (CCRSA)\" improved to 94% (last score: 83%).      Patient would benefit from continued outpatient skilled Speech Therapy services to promote positive communication interactions with both familiar & unfamiliar listeners, to follow directions within functional activities, to increase participation in meaningful activities, to facilitate overall improved quality of life, to reduce caregiver burden, and to receive instruction on HEP.     As indicated in his OP ST note from 10/02/2024, \"...It was reported that patient[place] and that he was approved for the [facility] until 12/22/2024...\" Patient reported that he will not be continuing with OP ST services after this week. Last week, clinician presented patient with a printout of an online flyer from the [facility]'s Speech-Language Pathology program. They reportedly offer in-house pro-dorota clinical services. It was reported that the last session ended in September 2024, but the next session will begin in April 2025. Patient was " provided with contact information and instructed to call/email the Director of Clinical Education if he was interested in being placed on a wait list. Reciprocal comprehension verbally expressed.      Short-term goals:  Patient will be educated on word finding strategies (i.e., circumlocution) for improved generative naming and verbal expression skills (to be achieved in 1-2 weeks). -- MET     Patient will complete concrete and abstract categorization tasks to 90% accuracy to facilitate improved generative naming skills (to be achieved in 8-10 weeks). -- PARTIALLY MET/ONGOING     Patient will complete word generation tasks (e.g., verbal fluency, categorization, analogies, synonyms/antonyms, etc.) with 80% accuracy using word finding strategies to facilitate improved word retrieval skills (to be achieved in 8-10 weeks). -- PARTIALLY MET/ONGOING     Patient will name up to 5 features to describe a person/place/thing with 80% accuracy to facilitate improved utilization of circumlocution strategy (to be achieved in 8-10 weeks). -- PARTIALLY MET/ONGOING     Patient will complete picture description tasks using language organization strategies with 80% accuracy to facilitate improved utilization of circumlocution strategy (to be achieved in 8-10 weeks). -- MET     Patient will answer complex verbal yes/no questions with 80% accuracy to facilitate increased auditory comprehension skills (to be achieved in 8-10 weeks). -- PARTIALLY MET/ONGOING     Patient will follow multistep verbal directions with 80% accuracy to facilitate increased auditory comprehension skills (to be achieved in 8-10 weeks). -- PARTIALLY MET/ONGOING     Patient will complete writing tasks at the sentence level with 80% accuracy to increase writing skills within FLE (to be achieved in 8-10 weeks). -- MET     Patient will read short paragraphs and answer multiple choice, yes/no, fill in the blank, and open-ended questions with 80% accuracy to facilitate  "increased comprehension of functional reading material (to be achieved in 8-10 weeks). -- MET     Patient will complete BDAE (short form assessment--[place] Naming Test & Narrative Writing) testing for further development of POC/goals (to be achieved in 1-2 weeks). -- MET    Long-term goals:  Patient will demonstrate improved expressive and receptive language skills from moderate to mild-WNL during structured and unstructured tasks (to be achieved by discharge). -- PARTIALLY MET/ONGOING     Patient will improve ability to facilitate communication to meet needs including use of compensatory strategies to promote meaningful interactions for improved quality of life and maximize level of independence (to be achieved by discharge). -- PARTIALLY MET/ONGOING      Plan:  Patient would benefit from outpatient skilled Speech Therapy services: Speech-language therapy    Frequency: 2x weekly (However, as mentioned above, patient reported that he will not be continuing with OP ST services after this week.)  Duration: 2 months    Intervention certification from: 12/16/2024  Intervention certification to: 02/16/2025      Subjective:  Patient reported that he was doing well today.    Patient's goal(s): \"to recollect and be fast with speaking, and to be able to write well\"       Objective (testing):  Communication Confidence Rating Scale for Aphasia (CCRSA) (0 = “not confident”, 50 = “moderately confident”, 100 = “very confident”)     How confident are you about your ability to talk with people? - 90/100     How confident are you about your ability to stay in touch with family and friends? - 90/100     How confident are you that people include you in conversations? - 90/100     How confident are you about your ability to follow news and sports on TV? - 100/100     How confident are you about your ability to follow movies on TV or in a theatre? - 90/100     How confident are you about your ability to speak on the phone? - 100/100   "   How confident are you that people understand you when you talk? - 90/100     How confident are you that you can make your own decisions? - 100/100     How confident are you about your ability to speak for yourself? - 100/100     How confident are you that you can participate in conversation about your finances? - 90/100     TOTAL - 94 (RE 08/12/24: 83% confidence) -- IMPROVEMENT      The [place] Diagnostic Aphasia Examination-Third Edition (BDAE-3) is a comprehensive standardized assessment designed to evaluate a broad range of language impairments that often arise as a consequence of organic brain dysfunction. The BDAE-3 is divided into five subtests, including conversational & expository speech, auditory comprehension, oral expression, reading, and writing. The results of the BDAE-3 are used to classify a patient’s language profiles into one of the localization-based classifications of aphasia: Broca’s, Wernicke’s, anomic, conduction, transcortical, transcortical motor, transcortical sensory, and global aphasia syndromes, although the test does not always provide a diagnosis or a therapeutic approach. The following results were obtained during the administration of the short form assessment:       Score: Percentile: RE Score: Status:   SEVERITY RATING:  3/5 80%ile 2.5 IMPROVEMENT          FLUENCY:       -Phrase length (rating scale) 6/7 30%ile 5 IMPROVEMENT   -Melodic line (rating scale) 6/7 80%ile 6 NO CHANGE   -Grammatical form (rating scale) 5/7 50%ile 3 IMPROVEMENT          CONVERSATION/EXPOSITORY SPEECH:      -Simple social responses 7/7 100%ile 7 NO CHANGE / MAX          AUDITORY COMPREHENSION:       -Basic word discrimination 14/16 40%ile 14 NO CHANGE   -Commands 10/10 100%ile 10 NO CHANGE / MAX   -Complex ideational material 5/6 70%ile 3 IMPROVEMENT          ARTICULATION:       -Articulatory agility (rating scale) 6/7 70%ile 6 IMPROVEMENT          RECITATION:       -Automatized sequences 4/4 100%ile 4 NO  CHANGE / MAX          REPETITION:       -Words 5/5 100%ile  5 NO CHANGE / MAX   -Sentences 2/2 100%ile 2 NO CHANGE / MAX          NAMING:       -Responsive naming 4/10 30%ile 6 DECLINE   -[place] Naming Test - short DNT  DNT    -Special categories 12/12 100%ile 12 NO CHANGE / MAX          READING:       -Matching cases & scripts 4/4 100%ile 4 NO CHANGE / MAX   -Number matching 4/4 100%ile 4 NO CHANGE / MAX   -Picture-word matching 3/4 40%ile 3 NO CHANGE   -Oral word reading 12/15 40%ile 12 NO CHANGE   -Oral sentence reading 4/5 80%ile 5 DECLINE   -Oral sentence comprehension 2/3 50%ile 3 DECLINE   -Sentence/paragraph comprehension 4/4 100%ile 4 NO CHANGE / MAX          WRITING:       -Form DNT  14    -Letter choice DNT  21    -Motor facility DNT  14    -Primer words DNT  4    -Regular phonics DNT  1    -Common irregular words 2/3 65%ile 2 NO CHANGE   -Written picture naming  1/4 30%ile 1 NO CHANGE   -Narrative writing 9/11 80%ile DNT      “RE” indicates the scores from the re-valuation (08/12/2024).    Due to time constraints, only portions of the standardized assessment were administered on this date of service.      Overall, patient presents with a mild-moderate expressive/receptive aphasia with a severity rating of 3 (out of a possible 5 being fluent speech).    0 - No usable speech or auditory comprehension.     1 - All communication is through fragmentary expression; great need for inference, questioning, and guessing by the listener. The range of information that can be exchanged is limited, and the listener carries the burden of communication.      2 - Conversation about familiar subjects is possible with help from the listener. There are frequent failures to convey the idea, but the patient shares the burden of communication.      3 - The patient can discuss almost all everyday problems with little or no assistance. Reduction of speech and/or comprehension, however, makes conversation about certain material  difficult or impossible.      4 - Some obvious loss of fluency in speech or facility of comprehension, without significant limitation on ideas expressed or form of expression.      5 - Minimal discernible speech handicap; the patient may have subjective difficulties that are not obvious to the listener.       Treatment:  N/A      Visit Tracking:  POC   Expires Auth Expiration Date ST Visit Limit   02/16/2025 CC approved until 12/22/24 BOMN              Visit/Unit Tracking:  Auth Status Date 12/04/24 12/09/24 12/11/24 12/16/24               CC approved until 12/22/24 Used 45 46 47 48                  Remaining BOMN BOMN BOMN BOMN             Intervention comments:  -Re-evaluation/administration of standardized test with patient (45 minutes)  -Scoring/interpretation of the standardized test for the development of POC, and write-up of the report (60 minutes)

## 2024-12-09 NOTE — PROGRESS NOTES
"Speech-Language Pathology Discharge    Today's date: 2024  Patient’s name: Galdino Gaines  : 1975  MRN: [id number]  Safety measures: h/o CVA  Referring provider: Evy Alvarez MD    Encounter Diagnosis     ICD-10-CM    1. Aphasia, late effect of cerebrovascular disease  I69.920       2. Cerebrovascular accident (CVA) of left basal ganglia (HCC)  I63.81         Assessment:   Patient to be discharged from outpatient skilled Speech Therapy services on this date of service.     Per re-evaluation note from Monday (2024),   \"...Patient was presented with portions of the BDAE-short form during today's re-assessment. Scores were compared to those he obtained on 2024. (It should be noted that patient is bilingual (primary language is Twi--patient is [facility] [place] [facility]; he is also fluent in English). Testing scores should be interpreted with caution.) Overall, patient can discuss almost all everyday problems with little or no assistance. Reduction of speech and/or comprehension, however, makes conversation about certain material difficult. On testing, patient demonstrated an improvement in score with phrase length, grammatical form, articulatory agility, and auditory comprehension of complex ideational material. A decline in score was noted with responsive naming, oral sentence reading, and oral sentence comprehension. Patient lost some points with basic word discrimination, picture-word matching, oral word reading, and melodic line. In 9 areas, patient's score remained \"unchanged\"--he stayed within the 100%ile. Patient continues to present with mild-moderate (improving) aphasia s/p CVA.     Verbal expression deficits can be c/b difficulty with word retrieval and verbal fluency. Patient implements circumlocution strategy in an attempt to self-cue. Patient demonstrates awareness to his deficits. When encountering difficulty with independent retrieval, clinician notes that patient benefits from semantic " "and phonemic cues, as well as visual cues and increased language processing time. Verbal repetition remains intact. Patient with inconsistent difficulty with answering complex yes/no questions and following of commands, as well as reduced processing speed. Patient continues with echolalic speech. Clinician suspects that this is a compensatory strategy that patient implements to self-cue to increase comprehension. Verbal repetition and rephrasing benefit patient during communication breakdowns. Occasional difficulty with reading comprehension noted during therapy, as well as during testing today. Patient benefits from having additional time, as well as reading material a second time through, as compensatory strategies, as well as clinician assisting with guided reading as needed. Improvements with overall graphic expression and content. Some spelling difficulties and missing of articles with sentence formation remain; however, English is patient's second language (?if this is patient's baseline status). Patient's \"Communication Confidence Rating Scale for Aphasia (CCRSA)\" improved to 94% (last score: 83%).    Patient would benefit from continued outpatient skilled Speech Therapy services to promote positive communication interactions with both familiar & unfamiliar listeners, to follow directions within functional activities, to increase participation in meaningful activities, to facilitate overall improved quality of life, to reduce caregiver burden, and to receive instruction on HEP.     As indicated in his OP ST note from 10/02/2024, \"...It was reported that patient's [place] Medicaid terminated and that he was approved for the [facility] Program until 12/22/2024...\" Patient reported that he will not be continuing with OP ST services after this week. Last week, clinician presented patient with a printout of an online flyer from the [facility]'s Speech-Language Pathology program. They reportedly offer " "in-house [facility] clinical services. It was reported that the last session ended in September 2024, but the next session will begin in April 2025. Patient was provided with contact information and instructed to call/email the Director of Clinical Education if he was interested in being placed on a wait list. Reciprocal comprehension verbally expressed.\"      Short-term goals:  Patient will be educated on word finding strategies (i.e., circumlocution) for improved generative naming and verbal expression skills (to be achieved in 1-2 weeks). -- MET     Patient will complete concrete and abstract categorization tasks to 90% accuracy to facilitate improved generative naming skills (to be achieved in 8-10 weeks). -- PARTIALLY MET/DISCHARGE     Patient will complete word generation tasks (e.g., verbal fluency, categorization, analogies, synonyms/antonyms, etc.) with 80% accuracy using word finding strategies to facilitate improved word retrieval skills (to be achieved in 8-10 weeks). -- PARTIALLY MET/DISCHARGE     Patient will name up to 5 features to describe a person/place/thing with 80% accuracy to facilitate improved utilization of circumlocution strategy (to be achieved in 8-10 weeks). -- PARTIALLY MET/DISCHARGE     Patient will complete picture description tasks using language organization strategies with 80% accuracy to facilitate improved utilization of circumlocution strategy (to be achieved in 8-10 weeks). -- MET     Patient will answer complex verbal yes/no questions with 80% accuracy to facilitate increased auditory comprehension skills (to be achieved in 8-10 weeks). -- PARTIALLY MET/DISCHARGE     Patient will follow multistep verbal directions with 80% accuracy to facilitate increased auditory comprehension skills (to be achieved in 8-10 weeks). -- PARTIALLY MET/DISCHARGE     Patient will complete writing tasks at the sentence level with 80% accuracy to increase writing skills within FLE (to be achieved in " "8-10 weeks). -- MET     Patient will read short paragraphs and answer multiple choice, yes/no, fill in the blank, and open-ended questions with 80% accuracy to facilitate increased comprehension of functional reading material (to be achieved in 8-10 weeks). -- MET     Patient will complete BDAE (short form assessment--[place] Naming Test & Narrative Writing) testing for further development of POC/goals (to be achieved in 1-2 weeks). -- MET    Long-term goals:  Patient will demonstrate improved expressive and receptive language skills from moderate to mild-WNL during structured and unstructured tasks (to be achieved by discharge). -- PARTIALLY MET/DISCHARGE     Patient will improve ability to facilitate communication to meet needs including use of compensatory strategies to promote meaningful interactions for improved quality of life and maximize level of independence (to be achieved by discharge). -- MET      Plan:  Recommendations:  -Patient to be discharged from outpatient skilled Speech Therapy services: Patient reported that he will not be continuing with OP ST services after this week. Patient to be discharged to an independent Home Exercise Program.    -Frequency: N/A  -Duration: N/A      Subjective:  Patient did not report any concerns upon arrival to therapy today.    Patient's goal(s): \"to recollect and be fast with speaking, and to be able to write well\"       Objective (testing):  No formal testing was completed on this date of service.      Treatment:  -Clinician presented patient with instruction on a packet of speech-language activities to work on as part of his home exercise program.     -Clinician provided patient with the [facility]'s \"Stroke [facility] Support Group: Adapting to Life after Stroke\" flyer. Patient was encouraged to sign-up online if he was interested.    -Patient was reminded to contact the Director of Clinical Education at [facility]'s Speech-Language Pathology program if he wishes " to pursue additional speech therapy services as offer in-house [facility] clinical services.      Visit Tracking:  POC   Expires Auth Expiration Date ST Visit Limit   12/18/24 CC approved until 12/22/24 BOMN              Visit/Unit Tracking:  Auth Status Date 12/04/24 12/09/24 12/11/24 12/16/24 12/18/24             CC approved until 12/22/24 Used 45 46 47 48   49               Remaining BOMN BOMN BOMN BOMN BOMN

## 2024-12-11 ENCOUNTER — OFFICE VISIT (OUTPATIENT)
Dept: SPEECH THERAPY | Facility: CLINIC | Age: 49
End: 2024-12-11

## 2024-12-11 DIAGNOSIS — I63.81 CEREBROVASCULAR ACCIDENT (CVA) OF LEFT BASAL GANGLIA (HCC): ICD-10-CM

## 2024-12-11 DIAGNOSIS — I69.920 APHASIA, LATE EFFECT OF CEREBROVASCULAR DISEASE: Primary | ICD-10-CM

## 2024-12-11 PROCEDURE — 92507 TX SP LANG VOICE COMM INDIV: CPT | Performed by: SPEECH-LANGUAGE PATHOLOGIST

## 2024-12-16 ENCOUNTER — EVALUATION (OUTPATIENT)
Dept: SPEECH THERAPY | Facility: CLINIC | Age: 49
End: 2024-12-16

## 2024-12-16 DIAGNOSIS — I63.81 CEREBROVASCULAR ACCIDENT (CVA) OF LEFT BASAL GANGLIA (HCC): ICD-10-CM

## 2024-12-16 DIAGNOSIS — I69.920 APHASIA, LATE EFFECT OF CEREBROVASCULAR DISEASE: Primary | ICD-10-CM

## 2024-12-16 PROCEDURE — 96105 ASSESSMENT OF APHASIA: CPT | Performed by: SPEECH-LANGUAGE PATHOLOGIST

## 2024-12-18 ENCOUNTER — OFFICE VISIT (OUTPATIENT)
Dept: SPEECH THERAPY | Facility: CLINIC | Age: 49
End: 2024-12-18

## 2024-12-18 DIAGNOSIS — I69.920 APHASIA, LATE EFFECT OF CEREBROVASCULAR DISEASE: Primary | ICD-10-CM

## 2024-12-18 DIAGNOSIS — I63.81 CEREBROVASCULAR ACCIDENT (CVA) OF LEFT BASAL GANGLIA (HCC): ICD-10-CM

## 2024-12-18 PROCEDURE — 92507 TX SP LANG VOICE COMM INDIV: CPT | Performed by: SPEECH-LANGUAGE PATHOLOGIST

## 2025-02-15 DIAGNOSIS — I10 PRIMARY HYPERTENSION: ICD-10-CM

## 2025-02-17 ENCOUNTER — OFFICE VISIT (OUTPATIENT)
Dept: FAMILY MEDICINE CLINIC | Facility: CLINIC | Age: 50
End: 2025-02-17

## 2025-02-17 VITALS
OXYGEN SATURATION: 99 % | HEART RATE: 59 BPM | DIASTOLIC BLOOD PRESSURE: 74 MMHG | HEIGHT: 66 IN | SYSTOLIC BLOOD PRESSURE: 144 MMHG | TEMPERATURE: 98.9 F | WEIGHT: 161 LBS | RESPIRATION RATE: 20 BRPM | BODY MASS INDEX: 25.88 KG/M2

## 2025-02-17 DIAGNOSIS — Z12.11 COLON CANCER SCREENING: ICD-10-CM

## 2025-02-17 DIAGNOSIS — Z00.00 ANNUAL PHYSICAL EXAM: Primary | ICD-10-CM

## 2025-02-17 DIAGNOSIS — I10 PRIMARY HYPERTENSION: ICD-10-CM

## 2025-02-17 PROCEDURE — 99396 PREV VISIT EST AGE 40-64: CPT | Performed by: FAMILY MEDICINE

## 2025-02-17 RX ORDER — LOSARTAN POTASSIUM 100 MG/1
100 TABLET ORAL DAILY
Qty: 90 TABLET | Refills: 0 | Status: SHIPPED | OUTPATIENT
Start: 2025-02-17

## 2025-02-17 NOTE — PROGRESS NOTES
Adult Annual Physical  Name: Galdino Gaines      : 1975      MRN: [id number]  Encounter Provider: Radha Platt MD  Encounter Date: 2025   Encounter department: [facility]    Assessment & Plan  Annual physical exam  Presents for annual well visit. Cologuard ordered for colon cancer screen. Pt without immunization record. Pt provided number for health bureau if unable to obtain reorders.       Colon cancer screening    Orders:    Cologuard    Primary hypertension  Slightly above  goal BP <130/80 given hx of CVA of left basal ganglia may 2024. Pt does not have BP log with him in office but reports BP's roughly 120-130/ 60's. Will continue losartan 100 mg daily. Reviewed diet and exercise recommendations. Patient given BP log to bring to follow up appointment.  ED precautions reviewed. Follow up in 3 months.          Immunizations and preventive care screenings were discussed with patient today. Appropriate education was printed on patient's after visit summary.      Immunization:  - Patient has immunization record in [facility]. Discussed obtaining records to bring to office and if not, provided patient with the number to the [facility] as patient does not have insurance.     Screening: I have discussed each screening test below (per USPSTF guideline) with patient, to which patient expresses understanding and is agreeable to plan  - Obesity -- Negative   - Hypertension -- Positive, medication compliant   - Nicotine/EtOH/Drugs -- Denies hx of tobacco use currently or in the past, denies alcohol or illicit drug use.   - Depression -- Negative   Depression: Not at risk (2025)    PHQ-2     PHQ-2 Score: 0     - HIV -- Negative   - Hep C -- Negative   Lab Results   Component Value Date/Time    HEPCAB Non-reactive 2024 07:37 AM     - T2DM -- A1C   Lab Results   Component Value Date    HGBA1C 5.0 2024     Lab Results   Component Value  Date    GLUF 106 (H) 11/07/2024    LDLCALC 88 09/19/2024    CREATININE 1.00 11/07/2024     - Aspirin -- On Asa 81 mg  - Statin -- on Lipitor 80 mg   - Colorectal cancer -- Cologuard ordered     Counseling:  Alcohol/drug use: discussed moderation in alcohol intake, the recommendations for healthy alcohol use, and avoidance of illicit drug use.  Dental Health: discussed importance of regular tooth brushing, flossing, and dental visits.  Sexual health: discussed sexually transmitted diseases, partner selection, use of condoms, avoidance of unintended pregnancy, and contraceptive alternatives.  Exercise: the importance of regular exercise/physical activity was discussed. Recommend exercise 3-5 times per week for at least 30 minutes.          History of Present Illness     Adult Annual Physical:  Patient presents for annual physical. Presents to clinic for Annual well visit and HTN follow up. States he keeps track of BP at home and BP runs roughly 120-130/60's. Denies symptomatic hypotension. Reports no other concerns at this time. .     Diet and Physical Activity:  - Diet/Nutrition: well balanced diet and limited junk food. skips BF most days, eats lung, dinner  - Exercise: no formal exercise and walking.    Depression Screening:  - PHQ-2 Score: 0    General Health:  - Sleep: sleeps well and > 8 hours of sleep on average.  - Hearing: normal hearing bilateral ears.  - Vision: wears glasses, most recent eye exam < 1 year ago and no vision problems.  - Dental: no dental visits for > 1 year, brushes teeth once daily and does not floss.     Health:  - History of STDs: no.   - Urinary symptoms: none.     Review of Systems   Constitutional:  Negative for activity change, appetite change, chills, fatigue, fever and unexpected weight change.   HENT:  Negative for congestion, dental problem, ear pain, sneezing and sore throat.    Eyes:  Negative for pain.        Wears glasses at baseline   Respiratory:  Negative for cough,  "chest tightness and shortness of breath.    Cardiovascular:  Negative for chest pain.   Gastrointestinal:  Negative for abdominal pain, constipation, diarrhea, nausea and vomiting.   Genitourinary:  Negative for dysuria, hematuria and testicular pain.   Musculoskeletal:  Negative for back pain and myalgias.   Skin:  Negative for rash.   Allergic/Immunologic: Negative for environmental allergies and food allergies.   Neurological:  Negative for weakness, numbness and headaches.   Psychiatric/Behavioral:  Negative for behavioral problems, confusion and sleep disturbance. The patient is not nervous/anxious.          Objective   /74   Pulse 59   Temp 98.9 °F (37.2 °C) (Temporal)   Resp 20   Ht 5' 6\" (1.676 m)   Wt 73 kg (161 lb)   SpO2 99%   BMI 25.99 kg/m²     Physical Exam  Vitals reviewed.   Constitutional:       General: He is not in acute distress.     Appearance: Normal appearance. He is well-developed. He is not ill-appearing.   HENT:      Head: Normocephalic and atraumatic.   Eyes:      Conjunctiva/sclera: Conjunctivae normal.   Cardiovascular:      Rate and Rhythm: Normal rate and regular rhythm.      Heart sounds: No murmur heard.  Pulmonary:      Effort: Pulmonary effort is normal. No respiratory distress.      Breath sounds: Normal breath sounds.   Abdominal:      General: Bowel sounds are normal.      Palpations: Abdomen is soft.      Tenderness: There is no abdominal tenderness.   Musculoskeletal:      Cervical back: Neck supple.      Right lower leg: No edema.      Left lower leg: No edema.   Skin:     General: Skin is warm and dry.      Capillary Refill: Capillary refill takes less than 2 seconds.   Neurological:      Mental Status: He is alert.   Psychiatric:         Mood and Affect: Mood normal.         "

## 2025-02-17 NOTE — PATIENT INSTRUCTIONS
"Try and [place] physician [place]. If you have not had a tetanus vaccination in the last 10 years please call the [facility] at [phone]    Patient Education     Routine physical for adults   The Basics   Written by the doctors and editors at [facility]   What is a physical? -- A physical is a routine visit, or \"check-up,\" with your doctor. You might also hear it called a \"wellness visit\" or \"preventive visit.\"  During each visit, the doctor will:   Ask about your physical and mental health   Ask about your habits, behaviors, and lifestyle   Do an exam   Give you vaccines if needed   Talk to you about any medicines you take   Give advice about your health   Answer your questions  Getting regular check-ups is an important part of taking care of your health. It can help your doctor find and treat any problems you have. But it's also important for preventing health problems.  A routine physical is different from a \"sick visit.\" A sick visit is when you see a doctor because of a health concern or problem. Since physicals are scheduled ahead of time, you can think about what you want to ask the doctor.  How often should I get a physical? -- It depends on your age and health. In general, for people age 21 years and older:   If you are younger than 50 years, you might be able to get a physical every 3 years.   If you are 50 years or older, your doctor might recommend a physical every year.  If you have an ongoing health condition, like diabetes or high blood pressure, your doctor will probably want to see you more often.  What happens during a physical? -- In general, each visit will include:   Physical exam - The doctor or nurse will check your height, weight, heart rate, and blood pressure. They will also look at your eyes and ears. They will ask about how you are feeling and whether you have any symptoms that bother you.   Medicines - It's a good idea to bring a list of " "all the medicines you take to each doctor visit. Your doctor will talk to you about your medicines and answer any questions. Tell them if you are having any side effects that bother you. You should also tell them if you are having trouble paying for any of your medicines.   Habits and behaviors - This includes:   Your diet   Your exercise habits   Whether you smoke, drink alcohol, or use drugs   Whether you are sexually active   Whether you feel safe at home  Your doctor will talk to you about things you can do to improve your health and lower your risk of health problems. They will also offer help and support. For example, if you want to quit smoking, they can give you advice and might prescribe medicines. If you want to improve your diet or get more physical activity, they can help you with this, too.   Lab tests, if needed - The tests you get will depend on your age and situation. For example, your doctor might want to check your:   Cholesterol   Blood sugar   Iron level   Vaccines - The recommended vaccines will depend on your age, health, and what vaccines you already had. Vaccines are very important because they can prevent certain serious or deadly infections.   Discussion of screening - \"Screening\" means checking for diseases or other health problems before they cause symptoms. Your doctor can recommend screening based on your age, risk, and preferences. This might include tests to check for:   Cancer, such as breast, prostate, cervical, ovarian, colorectal, prostate, lung, or skin cancer   Sexually transmitted infections, such as chlamydia and gonorrhea   Mental health conditions like depression and anxiety  Your doctor will talk to you about the different types of screening tests. They can help you decide which screenings to have. They can also explain what the results might mean.   Answering questions - The physical is a good time to ask the doctor or nurse questions about your health. If needed, they can " refer you to other doctors or specialists, too.  Adults older than 65 years often need other care, too. As you get older, your doctor will talk to you about:   How to prevent falling at home   Hearing or vision tests   Memory testing   How to take your medicines safely   Making sure that you have the help and support you need at home  All topics are updated as new evidence becomes available and our peer review process is complete.  This topic retrieved from [employer] on: May 02, 2024.  Topic 533726 Version 1.0  Release: 32.4.3 - C32.122  © 2024 UpToDate, Inc. and/or its affiliates. All rights reserved.  Consumer Information Use and Disclaimer   Disclaimer: This generalized information is a limited summary of diagnosis, treatment, and/or medication information. It is not meant to be comprehensive and should be used as a tool to help the user understand and/or assess potential diagnostic and treatment options. It does NOT include all information about conditions, treatments, medications, side effects, or risks that may apply to a specific patient. It is not intended to be medical advice or a substitute for the medical advice, diagnosis, or treatment of a health care provider based on the health care provider's examination and assessment of a patient's specific and unique circumstances. Patients must speak with a health care provider for complete information about their health, medical questions, and treatment options, including any risks or benefits regarding use of medications. This information does not endorse any treatments or medications as safe, effective, or approved for treating a specific patient. UpToDate, Inc. and its affiliates disclaim any warranty or liability relating to this information or the use thereof.The use of this information is governed by the Terms of Use, available at https://www.woltersCrowdvanceuwer.com/en/know/clinical-effectiveness-terms. 2024© UpToDate, Inc. and its affiliates and/or licensors.  "All rights reserved.  Copyright   © 2024 UpToDate, Inc. and/or its affiliates. All rights reserved.    Patient Education     Routine physical for adults   The Basics   Written by the doctors and editors at [facility]   What is a physical? -- A physical is a routine visit, or \"check-up,\" with your doctor. You might also hear it called a \"wellness visit\" or \"preventive visit.\"  During each visit, the doctor will:   Ask about your physical and mental health   Ask about your habits, behaviors, and lifestyle   Do an exam   Give you vaccines if needed   Talk to you about any medicines you take   Give advice about your health   Answer your questions  Getting regular check-ups is an important part of taking care of your health. It can help your doctor find and treat any problems you have. But it's also important for preventing health problems.  A routine physical is different from a \"sick visit.\" A sick visit is when you see a doctor because of a health concern or problem. Since physicals are scheduled ahead of time, you can think about what you want to ask the doctor.  How often should I get a physical? -- It depends on your age and health. In general, for people age 21 years and older:   If you are younger than 50 years, you might be able to get a physical every 3 years.   If you are 50 years or older, your doctor might recommend a physical every year.  If you have an ongoing health condition, like diabetes or high blood pressure, your doctor will probably want to see you more often.  What happens during a physical? -- In general, each visit will include:   Physical exam - The doctor or nurse will check your height, weight, heart rate, and blood pressure. They will also look at your eyes and ears. They will ask about how you are feeling and whether you have any symptoms that bother you.   Medicines - It's a good idea to bring a list of all the medicines you take to each doctor visit. Your doctor will talk to you about your " "medicines and answer any questions. Tell them if you are having any side effects that bother you. You should also tell them if you are having trouble paying for any of your medicines.   Habits and behaviors - This includes:   Your diet   Your exercise habits   Whether you smoke, drink alcohol, or use drugs   Whether you are sexually active   Whether you feel safe at home  Your doctor will talk to you about things you can do to improve your health and lower your risk of health problems. They will also offer help and support. For example, if you want to quit smoking, they can give you advice and might prescribe medicines. If you want to improve your diet or get more physical activity, they can help you with this, too.   Lab tests, if needed - The tests you get will depend on your age and situation. For example, your doctor might want to check your:   Cholesterol   Blood sugar   Iron level   Vaccines - The recommended vaccines will depend on your age, health, and what vaccines you already had. Vaccines are very important because they can prevent certain serious or deadly infections.   Discussion of screening - \"Screening\" means checking for diseases or other health problems before they cause symptoms. Your doctor can recommend screening based on your age, risk, and preferences. This might include tests to check for:   Cancer, such as breast, prostate, cervical, ovarian, colorectal, prostate, lung, or skin cancer   Sexually transmitted infections, such as chlamydia and gonorrhea   Mental health conditions like depression and anxiety  Your doctor will talk to you about the different types of screening tests. They can help you decide which screenings to have. They can also explain what the results might mean.   Answering questions - The physical is a good time to ask the doctor or nurse questions about your health. If needed, they can refer you to other doctors or specialists, too.  Adults older than 65 years often need " other care, too. As you get older, your doctor will talk to you about:   How to prevent falling at home   Hearing or vision tests   Memory testing   How to take your medicines safely   Making sure that you have the help and support you need at home  All topics are updated as new evidence becomes available and our peer review process is complete.  This topic retrieved from [employer] on: May 02, 2024.  Topic 377677 Version 1.0  Release: 32.4.3 - C32.122  © 2024 UpToDate, Inc. and/or its affiliates. All rights reserved.  Consumer Information Use and Disclaimer   Disclaimer: This generalized information is a limited summary of diagnosis, treatment, and/or medication information. It is not meant to be comprehensive and should be used as a tool to help the user understand and/or assess potential diagnostic and treatment options. It does NOT include all information about conditions, treatments, medications, side effects, or risks that may apply to a specific patient. It is not intended to be medical advice or a substitute for the medical advice, diagnosis, or treatment of a health care provider based on the health care provider's examination and assessment of a patient's specific and unique circumstances. Patients must speak with a health care provider for complete information about their health, medical questions, and treatment options, including any risks or benefits regarding use of medications. This information does not endorse any treatments or medications as safe, effective, or approved for treating a specific patient. UpToDate, Inc. and its affiliates disclaim any warranty or liability relating to this information or the use thereof.The use of this information is governed by the Terms of Use, available at https://www.wolterskluwer.com/en/know/clinical-effectiveness-terms. 2024© UpToDate, Inc. and its affiliates and/or licensors. All rights reserved.  Copyright   © 2024 UpToDate, Inc. and/or its affiliates. All rights  reserved.

## 2025-02-17 NOTE — PROGRESS NOTES
Adult Annual Physical  Name: Galdino Gaines      : 1975      MRN: [id number]  Encounter Provider: Radha Platt MD  Encounter Date: 2025   Encounter department: [facility]    Assessment & Plan  Annual physical exam           Immunizations and preventive care screenings were discussed with patient today. Appropriate education was printed on patient's after visit summary.    {Prostate cancer screening - consider in males between age of 40-75 depending on age, race, and risk factors. There is difference in the guidelines in regards to the optimal age for screening.  USPSTF states to consider periodic screening in males between the age of 50 to 69. This text is informational and does not need to be selected but if you wish, you can insert standard documentation in your progress note by using F2 (Optional):42825}    Counseling:  {Annual Physical; Counselin}         History of Present Illness   {?Quick Links Encounters * My Last Note * Last Note in Specialty * Snapshot * Since Last Visit * History :57367}  Adult Annual Physical  Review of Systems  {Select to Display PMH (Optional):05687}    Objective {?Quick Links Trend Vitals * Enter New Vitals * Results Review * Timeline (Adult) * Labs * Imaging * Cardiology * Procedures * Lung Cancer Screening * Surgical eConsent :92063}  There were no vitals taken for this visit.    Physical Exam  {Administrative / Billing Section (Optional):32399}

## 2025-02-18 NOTE — ASSESSMENT & PLAN NOTE
Slightly above  goal BP <130/80 given hx of CVA of left basal ganglia may 2024. Pt does not have BP log with him in office but reports BP's roughly 120-130/ 60's. Will continue losartan 100 mg daily. Reviewed diet and exercise recommendations. Patient given BP log to bring to follow up appointment.  ED precautions reviewed. Follow up in 3 months.

## 2025-03-17 NOTE — PROGRESS NOTES
Zio                                             Cardiology Follow Up    Galdino Gaines  1975  [id number]  [facility]  [address]  [place] [id number] [phone]      Assessment & Plan  Cerebrovascular accident (CVA) of left basal ganglia (HCC)  5/11/24 CVA in left basal ganglia.  2 weeks diet ordered in September he never received the monitor.  I reordered 2 weeks Zio out arrhythmia contributing to CVA.  I have briefly discussed loop in plant, if Zio does not show any arrhythmias.  Hand out information provided   Primary hypertension  LUE sitting 94183, home /78  Continue on losartan 100 mg daily, DASH diet  Continue with home blood pressure monitoring, instructed to use an upper arm blood pressure cuff, sit for 10 days prior to monitoring.  DASH diet f  Hypercholesterolemia  5/12/24 , TG 88, HDL 59,  9/19/24  TG 69 HDL 54 LDL 88  Continue Lipitor  80 mg daily, heart healthy diet        Interval History:   On 6/18/24 Galdino was seen in our office for a follow up visit.  He was accompanied by his sister.  Jesus did  not have health insurance he has not received the extended ambulatory Holter monitor.  Not willing to pay out-of-pocket.  He denied chest pain palpitations lightheadedness or dizziness.  He admitted to slight shortness of breath with walking quickly or working quickly. On 9/18/25 Galdino was seen by Dr eHaly.  2 week Zio ordered.  He was instructed on occasional home BP monitoring.       Galdino present to our office for a 6-month follow-up visit.  He denies dyspnea with minimal exertion chest pain palpitations lightheadedness or dizziness.  He denies residual deficits from his stroke.  He never received the Zio patch.  He is [employer] and has 100% coverage for his medical care.     Medical History   Primary Cardiologist  Hypertension  Hyperlipidemia 5/12/24 , TG 88, HDL 59,  9/19/24  TG 69  HDL 54 LDL 88  5/11/24 CVA in left basal ganglia.  Ambulatory event recorder ordered at discharge  Patient does not have health insurance.   EE showed left ventricle cavity size normal wall thickness normal LVEF 65% systolic function normal wall motion is normal.  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  Not suggestive of a mass.  Not felt to be an embolic source.  Atrial septum no patent foramen ovale detected left atrial appendage normal function no thrombus.  Right Ventre there is size normal systolic function normal.  Left atrium right atrium normal in size.  Aortic valve trileaflet no evidence of regurgitation no evidence of stenosis.  No evidence of significant valvular disease.      Patient Active Problem List   Diagnosis    Cerebrovascular accident (CVA) of left basal ganglia (HCC)    Primary hypertension    Intractable hiccups    Hypercholesterolemia    Under or uninsured     Past Medical History:   Diagnosis Date    Hypertension     Stroke (HCC)      Social History     Socioeconomic History    Marital status: Single     Spouse name: Not on file    Number of children: Not on file    Years of education: Not on file    Highest education level: Not on file   Occupational History    Not on file   Tobacco Use    Smoking status: Never     Passive exposure: Never    Smokeless tobacco: Never   Vaping Use    Vaping status: Never Used   Substance and Sexual Activity    Alcohol use: Never    Drug use: Never    Sexual activity: Not Currently   Other Topics Concern    Not on file   Social History Narrative    Not on file     Social Drivers of Health     Financial Resource Strain: Low Risk  (6/21/2024)    Overall Financial Resource Strain (CARDIA)     Difficulty of Paying Living Expenses: Not hard at all   Food Insecurity: No Food Insecurity (6/21/2024)    Nursing - Inadequate  Food Risk Classification     Worried About Running Out of Food in the Last Year: Never true     Ran Out of Food in the Last Year: Never true     Ran Out of Food in the Last Year: Not on file   Transportation Needs: No Transportation Needs (6/21/2024)    PRAPARE - Transportation     Lack of Transportation (Medical): No     Lack of Transportation (Non-Medical): No   Physical Activity: Inactive (6/21/2024)    Exercise Vital Sign     Days of Exercise per Week: 0 days     Minutes of Exercise per Session: 0 min   Stress: No Stress Concern Present (6/21/2024)    [employer] - Occupational Stress Questionnaire     Feeling of Stress : Not at all   Social Connections: Unknown (6/21/2024)    Social Connection and Isolation Panel [NHANES]     Frequency of Communication with Friends and Family: More than three times a week     Frequency of Social Gatherings with Friends and Family: More than three times a week     Attends Shinto Services: More than 4 times per year     Active Member of Clubs or Organizations: No     Attends Club or Organization Meetings: Never     Marital Status: Not on file   Intimate Partner Violence: Not At Risk (6/21/2024)    Humiliation, Afraid, Rape, and Kick questionnaire     Fear of Current or Ex-Partner: No     Emotionally Abused: No     Physically Abused: No     Sexually Abused: No   Housing Stability: Low Risk  (6/21/2024)    Housing Stability Vital Sign     Unable to Pay for Housing in the Last Year: No     Number of Times Moved in the Last Year: 1     Homeless in the Last Year: No      No family history on file.  No past surgical history on file.    Current Outpatient Medications:     aspirin (ECOTRIN LOW STRENGTH) 81 mg EC tablet, Take 1 tablet (81 mg total) by mouth daily, Disp: 120 tablet, Rfl: 3    atorvastatin (LIPITOR) 80 mg tablet, Take 1 tablet (80 mg total) by mouth every evening, Disp: 90 tablet, Rfl: 0    chlorproMAZINE (THORAZINE) 25 mg tablet, Take 1  tablet (25 mg total) by mouth 3 (three) times a day as needed (hiccups), Disp: 60 tablet, Rfl: 1    losartan (COZAAR) 100 MG tablet, TAKE 1 TABLET BY MOUTH EVERY DAY, Disp: 90 tablet, Rfl: 0  No Known Allergies    Labs:  No visits with results within 2 Month(s) from this visit.   Latest known visit with results is:   Appointment on 11/07/2024   Component Date Value    Sodium 11/07/2024 140     Potassium 11/07/2024 4.8     Chloride 11/07/2024 101     CO2 11/07/2024 28     ANION GAP 11/07/2024 11     BUN 11/07/2024 10     Creatinine 11/07/2024 1.00     Glucose, Fasting 11/07/2024 106 (H)     Calcium 11/07/2024 9.8     eGFR 11/07/2024 87      Imaging: No results found.    Review of Systems:  Review of Systems   Neurological:         Memory difficulties   Expressive aphasia    All other systems reviewed and are negative.      Physical Exam:  Physical Exam  Constitutional:       Appearance: Normal appearance.   HENT:      Head: Normocephalic.   Neck:      Vascular: No carotid bruit.   Cardiovascular:      Rate and Rhythm: Normal rate and regular rhythm.      Pulses: Normal pulses.      Heart sounds: Normal heart sounds.   Pulmonary:      Effort: Pulmonary effort is normal.      Breath sounds: Normal breath sounds.   Musculoskeletal:         General: Normal range of motion.      Cervical back: Normal range of motion and neck supple.      Right lower leg: No edema.      Left lower leg: No edema.   Skin:     General: Skin is warm and dry.      Capillary Refill: Capillary refill takes less than 2 seconds.   Neurological:      General: No focal deficit present.      Mental Status: He is alert and oriented to person, place, and time.   Psychiatric:         Mood and Affect: Mood normal.         Behavior: Behavior normal.

## 2025-03-18 ENCOUNTER — OFFICE VISIT (OUTPATIENT)
Dept: CARDIOLOGY CLINIC | Facility: CLINIC | Age: 50
End: 2025-03-18

## 2025-03-18 VITALS
OXYGEN SATURATION: 98 % | DIASTOLIC BLOOD PRESSURE: 80 MMHG | BODY MASS INDEX: 25.39 KG/M2 | WEIGHT: 158 LBS | HEIGHT: 66 IN | HEART RATE: 72 BPM | SYSTOLIC BLOOD PRESSURE: 148 MMHG

## 2025-03-18 DIAGNOSIS — I10 PRIMARY HYPERTENSION: ICD-10-CM

## 2025-03-18 DIAGNOSIS — I63.81 CEREBROVASCULAR ACCIDENT (CVA) OF LEFT BASAL GANGLIA (HCC): Primary | ICD-10-CM

## 2025-03-18 DIAGNOSIS — E78.00 HYPERCHOLESTEROLEMIA: ICD-10-CM

## 2025-03-18 PROCEDURE — 99214 OFFICE O/P EST MOD 30 MIN: CPT | Performed by: NURSE PRACTITIONER

## 2025-03-18 RX ORDER — ATORVASTATIN CALCIUM 80 MG/1
80 TABLET, FILM COATED ORAL EVERY EVENING
Qty: 90 TABLET | Refills: 3 | Status: SHIPPED | OUTPATIENT
Start: 2025-03-18 | End: 2025-09-14

## 2025-03-18 NOTE — ASSESSMENT & PLAN NOTE
5/11/24 CVA in left basal ganglia.  2 weeks diet ordered in September he never received the monitor.  I reordered 2 weeks Zio out arrhythmia contributing to CVA.  I have briefly discussed loop in plant, if Zio does not show any arrhythmias.  Hand out information provided   
5/12/24 , TG 88, HDL 59,  9/19/24  TG 69 HDL 54 LDL 88  Continue Lipitor  80 mg daily, heart healthy diet  
GUILLERMO sitting 37213, home /78  Continue on losartan 100 mg daily, DASH diet  Continue with home blood pressure monitoring, instructed to use an upper arm blood pressure cuff, sit for 10 days prior to monitoring.  DASH diet f  
Yes

## 2025-04-29 ENCOUNTER — OFFICE VISIT (OUTPATIENT)
Dept: FAMILY MEDICINE CLINIC | Facility: CLINIC | Age: 50
End: 2025-04-29

## 2025-04-29 VITALS
BODY MASS INDEX: 25.13 KG/M2 | OXYGEN SATURATION: 99 % | HEART RATE: 77 BPM | HEIGHT: 66 IN | WEIGHT: 156.4 LBS | RESPIRATION RATE: 18 BRPM | SYSTOLIC BLOOD PRESSURE: 117 MMHG | TEMPERATURE: 98.5 F | DIASTOLIC BLOOD PRESSURE: 70 MMHG

## 2025-04-29 DIAGNOSIS — M77.01 MEDIAL EPICONDYLITIS OF RIGHT ELBOW: Primary | ICD-10-CM

## 2025-04-29 PROCEDURE — 99213 OFFICE O/P EST LOW 20 MIN: CPT | Performed by: FAMILY MEDICINE

## 2025-04-29 NOTE — PROGRESS NOTES
Name: Galdino Gaines      : 1975      MRN: [id number]  Encounter Provider: Radha Platt MD  Encounter Date: 2025   Encounter department: [facility] [place]  :  Assessment & Plan  Medial epicondylitis of right elbow  2 mo hx of right elbow pain with physical exam findings consistent with medial epicondylitis. Recommend conservative measures including NSAIDS prn, topical diclofenan or lidocaine prn, ice, rest. Will refer to PT. Follow up in 6 weeks or sooner at needed.   Orders:    Ambulatory Referral to Physical Therapy; Future    Diclofenac Sodium (VOLTAREN) 1 %; Apply 2 g topically 4 (four) times a day           History of Present Illness   Patient presents to clinic due to two month hx of intermittent right medial elbow pain. He states the pain started randomly and denies any new activities or history or repetitive motion. Patient states that elbow flexion causes pain, denies any other exacerbating factors. States that pain radiates up to his shoulder. He denies swelling or erythema of right arm or elbow. Denies weakness, numbness, or tingling of right arm. Denies previous history of injury or surgery to right arm. Patient reports rest and ibuprofen helps relieve symptoms.         Review of Systems   Constitutional:  Negative for chills and fever.   HENT:  Negative for rhinorrhea and sore throat.    Eyes:  Negative for visual disturbance.   Respiratory:  Negative for cough and shortness of breath.    Cardiovascular:  Negative for chest pain and palpitations.   Gastrointestinal:  Negative for abdominal pain and constipation.   Genitourinary:  Negative for difficulty urinating and dysuria.   Musculoskeletal:  Positive for arthralgias. Negative for myalgias.   Skin:  Negative for rash.   Allergic/Immunologic: Negative for environmental allergies and food allergies.   Neurological:  Negative for dizziness and headaches.       Objective   /70 (BP Location:  "Left arm, Patient Position: Sitting, Cuff Size: Large)   Pulse 77   Temp 98.5 °F (36.9 °C) (Temporal)   Resp 18   Ht 5' 6\" (1.676 m)   Wt 70.9 kg (156 lb 6.4 oz)   SpO2 99%   BMI 25.24 kg/m²      Physical Exam  Vitals and nursing note reviewed.   Constitutional:       General: He is not in acute distress.     Appearance: He is well-developed.   HENT:      Head: Normocephalic and atraumatic.   Eyes:      Conjunctiva/sclera: Conjunctivae normal.   Cardiovascular:      Rate and Rhythm: Normal rate and regular rhythm.      Heart sounds: No murmur heard.  Pulmonary:      Effort: Pulmonary effort is normal. No respiratory distress.      Breath sounds: Normal breath sounds.   Abdominal:      Palpations: Abdomen is soft.      Tenderness: There is no abdominal tenderness.   Musculoskeletal:         General: No swelling.      Right shoulder: Normal.      Left shoulder: Normal.      Right elbow: Normal.      Left elbow: Normal.      Cervical back: Neck supple.      Comments: Pain with supination of right upper extremity.    Skin:     General: Skin is warm and dry.      Capillary Refill: Capillary refill takes less than 2 seconds.   Neurological:      Mental Status: He is alert.   Psychiatric:         Mood and Affect: Mood normal.       "

## 2025-04-29 NOTE — PATIENT INSTRUCTIONS
Use tylenol and ibuprofen as needed for pain and swelling or right elbow. Use topical volteran gel as needed for right elbow pain. Use ice as needed to elbow.   Call physical therapy to schedule an appointment

## 2025-05-06 ENCOUNTER — EVALUATION (OUTPATIENT)
Dept: PHYSICAL THERAPY | Facility: REHABILITATION | Age: 50
End: 2025-05-06

## 2025-05-06 DIAGNOSIS — M77.01 MEDIAL EPICONDYLITIS OF RIGHT ELBOW: Primary | ICD-10-CM

## 2025-05-06 PROCEDURE — 97161 PT EVAL LOW COMPLEX 20 MIN: CPT

## 2025-05-06 PROCEDURE — 97110 THERAPEUTIC EXERCISES: CPT

## 2025-05-06 NOTE — PROGRESS NOTES
PT Evaluation     Today's date: 2025  Patient name: Galdino Gaines  : 1975  MRN: [id number]  Referring provider: Radha Platt,*  Dx:   Encounter Diagnosis     ICD-10-CM    1. Medial epicondylitis of right elbow  M77.01 Ambulatory Referral to Physical Therapy          Start Time: 1245  Stop Time: 1325  Total time in clinic (min): 40 minutes    Assessment  Impairments: abnormal coordination, abnormal muscle firing, abnormal or restricted ROM, activity intolerance, impaired balance, impaired physical strength, lacks appropriate home exercise program, pain with function, weight-bearing intolerance, poor body mechanics, unable to perform ADL, participation limitations, activity limitations and endurance    Assessment details: Galdino Gaines is a 49 y.o. male presenting with symptoms similar to right medial epicondylitis. Primary impairments include limited ROM, strength and pain with activity. Will benefit from skilled PT interventions for these impairments to return to PLOF. HEP was provided and reviewed. Patient is able to complete HEP with good technique and appropriate pain response. Patient expressed understanding of appropriate dosage and frequency of HEP and was instructed to discontinue exercises if symptoms are exacerbated. No additional referral necessary. 1:1 with Kaleigh Adorno, PT, DPT for entirety of session.     Understanding of Dx/Px/POC: good     Prognosis: good    Goals    Short Term Goals:  In 2 weeks, the patient will:  1. Decrease worst level of pain by 1 point subjectively   2. Increase elbow flexion ROM to with in functional limits  3. Supervision with HEP for self care    Long Term Goals:  In 8 weeks, the patient will:  1. Increase elbow flexion strength to within 90% of the left side  2. FOTO to greater than predicted value  3. Independent with HEP for selfcare        Plan  Patient would benefit from: skilled physical therapy and PT eval  Planned modality interventions:  "biofeedback, electrical stimulation/Russian stimulation, TENS, neuromuscular electric stimulation and unattended electrical stimulation    Planned therapy interventions: abdominal trunk stabilization, activity modification, ADL retraining, ADL training, balance, balance/weight bearing training, behavior modification, body mechanics training, functional ROM exercises, flexibility, fine motor coordination training, gait training, graded activity, graded exercise, graded motor, home exercise program, therapeutic training, therapeutic exercise, therapeutic activities, postural training, self care, strengthening, stretching, patient/caregiver education, motor coordination training, muscle pump exercises, nerve gliding, neuromuscular re-education, manual therapy, joint mobilization and kinesiology taping    Frequency: 2x week  Duration in weeks: 8  Plan of Care beginning date: 5/6/2025  Plan of Care expiration date: 7/1/2025  Treatment plan discussed with: patient        Subjective  Diagnosis: right medial elbow pain   ZAIN: pain began over two months ago   DOI: insidious onset  Limitations: cannot sleep on right side  Aggravating factors: lifting something heavy   Easing factors: rest   PSHx: n/a   Pain: Best-  0/10, Worst-  7-8/10, Current-  3/10 pain medication has helped a lot \"if I dont apply pressure and rest its okay\" During initial evaluation patient points to lateral elbow when asked where pain is and he mention it refers up the tricep into the shoulder.   PLOF: no issues prior   Goals: relieve pain    Objective    Strength and ROM evaluated B from a regional biomechanical perspective and values relevant to this episode recorded in tables below    Range of Motion measurements for the Elbow (in degrees)  Joint Motion Right:   5/6/2025   Left:   5/6/2025     Flexion (140-150) wnl wnl   Extension (-10 - 0) wnl wnl   Pronation (81-88) Wnl     Supination (87-94) Wnl !        Range of Motion measurements for the Wrist " (in degrees)  Joint Motion Right:   5/6/2025   Left:   5/6/2025     Flexion (74-77) wnl wnl   Extension (64-68) wnl wnl   Ulnar Deviation (30-32) wnl    Radial Deviation (17-19) wnl        UE Manual Muscle Testing  Test Action RIGHT   5/6/2025   LEFT  5/6/2025      Shoulder elevation 5/5 ! 5/5   Shoulder flexion 3+/5 ! 5/5   Shoulder abduction 5/5 5/5   Elbow Flexion 4+/5 5/5   Elbow extension 4/5 ! 5/5   Wrist Flexion 5/5 5/5   Wrist Extension 5/5 5/5   Wrist Radial Deviation 5/5 5/5   Wrist Ulnar Deviation 5/5 5/5    75# !  80#      Palpation:      Special Tests  Test / Measure  5/6/2025   Tinel's Sign -   Reverse Cozen's (Medial Epicondylitis) -   Cozen's (Active)  Lateral Epicondylitis  +   Mill's (Passive)  Lateral Epicondylitis  +   Maduley's Test (resist middle digit) -   Elbow Valgus Test (MCL) -   Elbow Varus Test (LCL) -         Dural Mobility:   Median Nerve: -  Radial Nerve: -  Ulnar Nerve: -    Flowsheet Rows      Flowsheet Row Most Recent Value   PT/OT G-Codes    Current Score 63   Projected Score 72               Precautions: n/a     POC expires Unit limit Auth Expiration date PT/OT + Visit Limit?   7/1/2025 BOMN 6/27/2025 60         Visit/Unit Tracking  AUTH Status:  Date 5/6 6/27/2025 Used 1         Remaining             Pertinent Findings:      POC End Date: 7/1/2025                                                                                          Test / Measure  5/6   FOTO (Predicted 72) 63   Right Elbow Ext Strength  4/5 !   Right Shoulder Flexion Strength  3+/5 !   AROM Right Supination  Wnl !       Access Code: [email or web address]  URL: [email or web address]  Date: 05/06/2025  Prepared by: Kaleigh Adorno    Exercises  - Standing Single Arm Elbow Flexion with Resistance  - 2 x daily - 2 sets - 10 reps - 5 hold  - Standing Elbow Extension with Self-Anchored Resistance  - 2 x daily - 2 sets - 10 reps - 5 hold      Manuals 5/6            PROM                                                      Neuro Re-Ed             Nerve Glides              Pro/Supination             Gripper                                                                 Ther Ex             Row             Pull downs              Curl             Tricep Extension              Bicep Stretch                                                    Ther Activity                          UBE             Gait Training                                       Modalities

## 2025-05-09 ENCOUNTER — OFFICE VISIT (OUTPATIENT)
Dept: PHYSICAL THERAPY | Facility: REHABILITATION | Age: 50
End: 2025-05-09

## 2025-05-09 DIAGNOSIS — M77.01 MEDIAL EPICONDYLITIS OF RIGHT ELBOW: Primary | ICD-10-CM

## 2025-05-09 PROCEDURE — 97112 NEUROMUSCULAR REEDUCATION: CPT

## 2025-05-09 PROCEDURE — 97140 MANUAL THERAPY 1/> REGIONS: CPT

## 2025-05-09 PROCEDURE — 97110 THERAPEUTIC EXERCISES: CPT

## 2025-05-09 NOTE — PROGRESS NOTES
"Daily Note     Today's date: 2025  Patient name: Galdino Gaines  : 1975  MRN: [id number]  Referring provider: Emely Flowers MD  Dx:   Encounter Diagnosis     ICD-10-CM    1. Medial epicondylitis of right elbow  M77.01           Start Time: 09  Stop Time: 1024  Total time in clinic (min): 44 minutes    Subjective:  Pt notes he feels the same as during his initial eval. His pain is around a 3/10 and HEP has been going well.        Objective: See treatment diary below      Assessment: New strengthening and stretching initiated this visit. Pt noted that a lot of his pain today is in his right shoulder. Shoulder isometrics were initiated and patient was able to perform with reports of \"a little bit\" of pain. Pt instructed to change how hard he was pressing in-order to avoid pain. Tolerated treatment well. Patient demonstrated fatigue post treatment and would benefit from continued PT. 1:1 with Kaleigh Adorno DPT for entirety of session.         Plan: Continue per plan of care.      Precautions: n/a     POC expires Unit limit Auth Expiration date PT/OT + Visit Limit?   2025 BOMN 2025 60         Visit/Unit Tracking  AUTH Status:  Date 2025 Used 1 2        Remaining             Pertinent Findings:      POC End Date: 2025                                                                                          Test / Measure     FOTO (Predicted 72) 63   Right Elbow Ext Strength  4/5 !   Right Shoulder Flexion Strength  3+/5 !   AROM Right Supination  Wnl !     Access Code: [email or web address]  URL: [email or web address]  Date: 2025  Prepared by: Kaleigh Adorno    Exercises  - Standing Single Arm Elbow Flexion with Resistance  - 2 x daily - 2 sets - 10 reps - 5 hold  - Standing Elbow Extension with Self-Anchored Resistance  - 2 x daily - 2 sets - 10 reps - 5 hold  - Isometric Shoulder Flexion at Wall  - 1 x daily - 10 reps - 5 hold  - Isometric Shoulder Abduction at Wall " " - 1 x daily - 10 reps - 5 hold  - Isometric Shoulder External Rotation at Wall  - 1 x daily - 10 reps - 5 hold  - Standing Isometric Shoulder Internal Rotation at Doorway  - 1 x daily - 10 reps - 5 hold      Manuals 5/6 5/9           PROM              IASTM                                       Neuro Re-Ed             Nerve Glides              Hammer Pro/Supination  20x ea  pg          Gripper  2x10 5\" Blk           Wrist Ext Ecc  Red FB 2x10            Shoulder Isometrics F/Abd/IR/ER   10x5\" ea                                      Ther Ex             Row  2x10 16#            Pull downs              Curl  2x10 8#            Tricep Extension   2x10 10#            Bicep S'  2x20\"            Wrist Ext/F S'  3x30\"                                      Ther Activity                          UBE  4/4           Gait Training                                       Modalities                                            "

## 2025-05-13 ENCOUNTER — OFFICE VISIT (OUTPATIENT)
Dept: PHYSICAL THERAPY | Facility: REHABILITATION | Age: 50
End: 2025-05-13

## 2025-05-13 DIAGNOSIS — M77.01 MEDIAL EPICONDYLITIS OF RIGHT ELBOW: Primary | ICD-10-CM

## 2025-05-13 PROCEDURE — 97110 THERAPEUTIC EXERCISES: CPT

## 2025-05-13 PROCEDURE — 97112 NEUROMUSCULAR REEDUCATION: CPT

## 2025-05-13 PROCEDURE — 97530 THERAPEUTIC ACTIVITIES: CPT

## 2025-05-13 NOTE — PROGRESS NOTES
"Daily Note     Today's date: 2025  Patient name: Galdino Gaines  : 1975  MRN: [id number]  Referring provider: Radha Platt,*  Dx:   Encounter Diagnosis     ICD-10-CM    1. Medial epicondylitis of right elbow  M77.01             Start Time: 1307  Stop Time: 1352  Total time in clinic (min): 45 minutes    Subjective:  Pt notes he feels the same as during his initial eval. His pain is around a 3/10 and HEP has been going well.        Objective: See treatment diary below      Assessment: New strengthening and stretching initiated this visit. Pt noted that a lot of his pain today is in his right shoulder. Shoulder isometrics were changed from pressing against wall to pressing into green theraband to limit the intensity of the exercise. This resulted slight discomfort but the patient felt, \"it is good\". Tolerated treatment well. Patient demonstrated fatigue post treatment and would benefit from continued PT. 1:1 with Kaleigh Adorno DPT for entirety of session.         Plan: Continue per plan of care.      Precautions: n/a     POC expires Unit limit Auth Expiration date PT/OT + Visit Limit?   2025 BOMN 2025 60         Visit/Unit Tracking  AUTH Status:  Date 2025 Used 1 2 3       Remaining             Pertinent Findings:      POC End Date: 2025                                                                                          Test / Measure     FOTO (Predicted 72) 63   Right Elbow Ext Strength  4/5 !   Right Shoulder Flexion Strength  3+/5 !   AROM Right Supination  Wnl !     Access Code: [email or web address]  URL: [email or web address]  Date: 2025  Prepared by: Kaleigh Adorno    Exercises  - Standing Single Arm Elbow Flexion with Resistance  - 2 x daily - 2 sets - 10 reps - 5 hold  - Standing Elbow Extension with Self-Anchored Resistance  - 2 x daily - 2 sets - 10 reps - 5 hold  - Isometric Shoulder Flexion at Wall  - 1 x daily - 10 reps - 5 hold  - " "Isometric Shoulder Abduction at Wall  - 1 x daily - 10 reps - 5 hold  - Isometric Shoulder External Rotation at Wall  - 1 x daily - 10 reps - 5 hold  - Standing Isometric Shoulder Internal Rotation at Doorway  - 1 x daily - 10 reps - 5 hold      Manuals 5/6 5/9 5/13          PROM              IASTM                                       Neuro Re-Ed             Nerve Glides              Hammer Pro/Supination  20x ea  20x 2#           Gripper  2x10 5\" Blk 2x10 5\" Blk          Wrist Ext Ecc  Red FB 2x10  Red FB 2x10          Shoulder Isometrics F/Abd/IR/ER   10x5\" ea  10x5\" ea GTB                                     Ther Ex             Row  2x10 16#  2x12 16#          Pull downs              Curl  2x10 8#  2x10 8#           Tricep Extension   2x10 10#  2x10 10#           Bicep S'  2x20\"  3x30\"           Wrist Ext S'  3x30\"  2x30\"           Wrist F S'    2x30\"                        Ther Activity                          UBE  4/4 4/4          Gait Training                                       Modalities                                            "

## 2025-05-16 ENCOUNTER — APPOINTMENT (OUTPATIENT)
Dept: PHYSICAL THERAPY | Facility: REHABILITATION | Age: 50
End: 2025-05-16

## 2025-05-19 ENCOUNTER — OFFICE VISIT (OUTPATIENT)
Dept: FAMILY MEDICINE CLINIC | Facility: CLINIC | Age: 50
End: 2025-05-19

## 2025-05-19 VITALS
BODY MASS INDEX: 24.92 KG/M2 | OXYGEN SATURATION: 98 % | TEMPERATURE: 98 F | DIASTOLIC BLOOD PRESSURE: 92 MMHG | HEART RATE: 56 BPM | RESPIRATION RATE: 16 BRPM | SYSTOLIC BLOOD PRESSURE: 149 MMHG | WEIGHT: 154.4 LBS

## 2025-05-19 DIAGNOSIS — I10 PRIMARY HYPERTENSION: ICD-10-CM

## 2025-05-19 DIAGNOSIS — I10 PRIMARY HYPERTENSION: Primary | ICD-10-CM

## 2025-05-19 DIAGNOSIS — M77.01 MEDIAL EPICONDYLITIS OF RIGHT ELBOW: ICD-10-CM

## 2025-05-19 PROCEDURE — 99213 OFFICE O/P EST LOW 20 MIN: CPT | Performed by: FAMILY MEDICINE

## 2025-05-19 RX ORDER — LOSARTAN POTASSIUM 100 MG/1
100 TABLET ORAL DAILY
Qty: 90 TABLET | Refills: 1 | Status: SHIPPED | OUTPATIENT
Start: 2025-05-19 | End: 2025-11-15

## 2025-05-19 RX ORDER — LOSARTAN POTASSIUM 100 MG/1
100 TABLET ORAL DAILY
Qty: 90 TABLET | Refills: 0 | OUTPATIENT
Start: 2025-05-19

## 2025-05-19 NOTE — PROGRESS NOTES
Name: Galdino Gaines      : 1975      MRN: [id number]  Encounter Provider: Radha Platt MD  Encounter Date: 2025   Encounter department: [facility] [place]  :  Assessment & Plan  Primary hypertension  Home BP log at goal of  BP <130/80 given hx of CVA of left basal ganglia may 2024.  Will continue losartan 100 mg daily. Reviewed diet and exercise recommendations. Patient given BP log to bring to follow up appointment.  ED precautions reviewed. Follow up in 6 months.     Orders:    losartan (COZAAR) 100 MG tablet; Take 1 tablet (100 mg total) by mouth daily      Medial epicondylitis of right elbow  Improved with conservative measures of PT, Ice, and topical therapies. Continue conservative measures at this time.               History of Present Illness   Mr. Gaines presents to clinic for follow up of HTN and right elbow pain.     He states he has been participating in PT and notes improvement in his right elbow pain. He states he has been using Voltaren topically which he feels has been helping with his discomfort.    Patient states he has been keeping track of blood pressure at home and BP has been running between 120's-120/ 70's. He states his BP is elevated today in office because he has not taken his BP med yet this morning and typically takes medication in AM. Denies signs or symptoms of hypotension.       Review of Systems   Constitutional:  Negative for chills and fever.   HENT:  Negative for rhinorrhea and sore throat.    Eyes:  Negative for redness and visual disturbance.   Respiratory:  Negative for cough and shortness of breath.    Cardiovascular:  Negative for chest pain and palpitations.   Gastrointestinal:  Negative for abdominal pain, constipation and nausea.   Genitourinary:  Negative for difficulty urinating and dysuria.   Musculoskeletal:  Negative for joint swelling.   Skin:  Negative for rash.   Allergic/Immunologic: Negative for  environmental allergies and food allergies.   Neurological:  Negative for dizziness and headaches.       Objective   /92 (BP Location: Left arm, Patient Position: Sitting, Cuff Size: Standard)   Pulse 56   Temp 98 °F (36.7 °C)   Resp 16   Wt 70 kg (154 lb 6.4 oz)   SpO2 98%   BMI 24.92 kg/m²      Physical Exam  Vitals and nursing note reviewed.   Constitutional:       General: He is not in acute distress.     Appearance: He is well-developed.   HENT:      Head: Normocephalic and atraumatic.     Eyes:      Conjunctiva/sclera: Conjunctivae normal.       Cardiovascular:      Rate and Rhythm: Normal rate and regular rhythm.      Heart sounds: No murmur heard.  Pulmonary:      Effort: Pulmonary effort is normal. No respiratory distress.      Breath sounds: Normal breath sounds.   Abdominal:      Palpations: Abdomen is soft.      Tenderness: There is no abdominal tenderness.     Musculoskeletal:         General: No swelling.      Cervical back: Neck supple.     Skin:     General: Skin is warm and dry.      Capillary Refill: Capillary refill takes less than 2 seconds.     Neurological:      Mental Status: He is alert.     Psychiatric:         Mood and Affect: Mood normal.

## 2025-05-19 NOTE — ASSESSMENT & PLAN NOTE
Home BP log at goal of  BP <130/80 given hx of CVA of left basal ganglia may 2024.  Will continue losartan 100 mg daily. Reviewed diet and exercise recommendations. Patient given BP log to bring to follow up appointment.  ED precautions reviewed. Follow up in 6 months.     Orders:    losartan (COZAAR) 100 MG tablet; Take 1 tablet (100 mg total) by mouth daily

## 2025-05-23 ENCOUNTER — APPOINTMENT (OUTPATIENT)
Dept: PHYSICAL THERAPY | Facility: REHABILITATION | Age: 50
End: 2025-05-23

## 2025-05-29 NOTE — PROGRESS NOTES
I called Galdino today to inform him that the ZIO did not show any evidence of atrial fibrillation or arrhythmia contributing to his CVA.  I have discussed a loop implant.  Risk versus the benefit discussed.  He is refusing at this time.

## 2025-05-30 ENCOUNTER — OFFICE VISIT (OUTPATIENT)
Dept: PHYSICAL THERAPY | Facility: REHABILITATION | Age: 50
End: 2025-05-30

## 2025-05-30 DIAGNOSIS — M77.01 MEDIAL EPICONDYLITIS OF RIGHT ELBOW: Primary | ICD-10-CM

## 2025-05-30 PROCEDURE — 97112 NEUROMUSCULAR REEDUCATION: CPT

## 2025-05-30 PROCEDURE — 97110 THERAPEUTIC EXERCISES: CPT

## 2025-05-30 NOTE — PROGRESS NOTES
Daily Note     Today's date: 2025  Patient name: Galdino Gaines  : 1975  MRN: [id number]  Referring provider: Radha Platt,*  Dx:   Encounter Diagnosis     ICD-10-CM    1. Medial epicondylitis of right elbow  M77.01                      Subjective: Patient states he has more pain in his R shoulder when lifting something to heavy or when he wakes up sleeping on it.       Objective: See treatment diary below      Assessment: Tolerated treatment fair. Issued Blue and maroon T band for HEP and reviewed as well. Soreness persisted throughout treatment but no other increased symptoms. Patient would benefit from continued PT      Plan: Continue per plan of care.       Precautions: n/a     POC expires Unit limit Auth Expiration date PT/OT + Visit Limit?   2025 BOMN 2025 60         Visit/Unit Tracking  AUTH Status:  Date 2025 Used 1 2 3 4      Remaining             Pertinent Findings:      POC End Date: 2025                                                                                          Test / Measure     FOTO (Predicted 72) 63   Right Elbow Ext Strength  4/5 !   Right Shoulder Flexion Strength  3+/5 !   AROM Right Supination  Wnl !     Access Code: [email or web address]  URL: [email or web address]  Date: 2025  Prepared by: Kaleigh Adorno    Exercises  - Standing Single Arm Elbow Flexion with Resistance  - 2 x daily - 2 sets - 10 reps - 5 hold  - Standing Elbow Extension with Self-Anchored Resistance  - 2 x daily - 2 sets - 10 reps - 5 hold  - Isometric Shoulder Flexion at Wall  - 1 x daily - 10 reps - 5 hold  - Isometric Shoulder Abduction at Wall  - 1 x daily - 10 reps - 5 hold  - Isometric Shoulder External Rotation at Wall  - 1 x daily - 10 reps - 5 hold  - Standing Isometric Shoulder Internal Rotation at Doorway  - 1 x daily - 10 reps - 5 hold      Manuals          PROM              IASTM                                     "   Neuro Re-Ed             Nerve Glides              Hammer Pro/Supination  20x ea  20x 2#  2# 3 x 10          Gripper  2x10 5\" Blk 2x10 5\" Blk Blk 5\" 2 x 10          Wrist Ext Ecc  Red FB 2x10  Red FB 2x10 Red FB 2 x 10          Shoulder Isometrics F/Abd/IR/ER   10x5\" ea  10x5\" ea GTB  GTB 5\" x 10                                    Ther Ex             Row  2x10 16#  2x12 16# 16# 3 x 10          Pull downs              Curl  2x10 8#  2x10 8#  8# 2 x 10          Tricep Extension   2x10 10#  2x10 10#  10# 2 x 10          Bicep S'  2x20\"  3x30\"  3 x 30\"         Wrist Ext S'  3x30\"  2x30\"  2 x 30\"          Wrist F S'    2x30\"  2 x 30\"                       Ther Activity                          UBE  4/4 4/4 4'/4'         Gait Training                                       Modalities                                              "

## 2025-06-04 ENCOUNTER — OFFICE VISIT (OUTPATIENT)
Dept: PHYSICAL THERAPY | Facility: REHABILITATION | Age: 50
End: 2025-06-04

## 2025-06-04 DIAGNOSIS — M77.01 MEDIAL EPICONDYLITIS OF RIGHT ELBOW: Primary | ICD-10-CM

## 2025-06-04 PROCEDURE — 97112 NEUROMUSCULAR REEDUCATION: CPT

## 2025-06-04 PROCEDURE — 97110 THERAPEUTIC EXERCISES: CPT

## 2025-06-04 PROCEDURE — 97530 THERAPEUTIC ACTIVITIES: CPT

## 2025-06-04 NOTE — PROGRESS NOTES
Daily Note     Today's date: 2025  Patient name: Galdino aGines  : 1975  MRN: [id number]  Referring provider: Emely Flowers MD  Dx:   Encounter Diagnosis     ICD-10-CM    1. Medial epicondylitis of right elbow  M77.01           Start Time: 920  Stop Time: 958  Total time in clinic (min): 38 minutes    Subjective: Galdino denies soreness upon arrival. Elbow has been getting better. Still gets some radiating pain to his tricep.      Objective: See treatment diary below      Assessment: Tolerated treatment well. Continued with progressions of existing interventions as charted below with good tolerance. Patient continues to be appropriately challenged at the current therapeutic volume and intensity. We will continue to progress as tolerated.  Patient would benefit from continued PT      Plan: Continue per plan of care.       Precautions: n/a     POC expires Unit limit Auth Expiration date PT/OT + Visit Limit?   2025 BOMN 2025 60         Visit/Unit Tracking  AUTH Status:  Date 2025 Used 1 2 3 4 5     Remaining             Pertinent Findings:      POC End Date: 2025                                                                                          Test / Measure     FOTO (Predicted 72) 63   Right Elbow Ext Strength  4/5 !   Right Shoulder Flexion Strength  3+/5 !   AROM Right Supination  Wnl !     Access Code: [email or web address]  URL: [email or web address]  Date: 2025  Prepared by: Kaleigh Adorno    Exercises  - Standing Single Arm Elbow Flexion with Resistance  - 2 x daily - 2 sets - 10 reps - 5 hold  - Standing Elbow Extension with Self-Anchored Resistance  - 2 x daily - 2 sets - 10 reps - 5 hold  - Isometric Shoulder Flexion at Wall  - 1 x daily - 10 reps - 5 hold  - Isometric Shoulder Abduction at Wall  - 1 x daily - 10 reps - 5 hold  - Isometric Shoulder External Rotation at Wall  - 1 x daily - 10 reps - 5 hold  - Standing Isometric Shoulder  "Internal Rotation at Doorway  - 1 x daily - 10 reps - 5 hold      Manuals 5/6 5/9 5/13 5/30 6/4        PROM              IASTM                                       Neuro Re-Ed             Nerve Glides              Hammer Pro/Supination  20x ea  20x 2#  2# 3 x 10  2x10 ea red therabar        Gripper  2x10 5\" Blk 2x10 5\" Blk Blk 5\" 2 x 10  Blk 5\"2x10        Wrist Ext Ecc  Red FB 2x10  Red FB 2x10 Red FB 2 x 10  Red FB 2x10        Shoulder Isometrics F/Abd/IR/ER   10x5\" ea  10x5\" ea GTB  GTB 5\" x 10                                    Ther Ex             Row  2x10 16#  2x12 16# 16# 3 x 10  16# 3x10        Pull downs      16# 2x10        Curl  2x10 8#  2x10 8#  8# 2 x 10  3x10 10#        Tricep Extension   2x10 10#  2x10 10#  10# 2 x 10  2x10 15#        Bicep S'  2x20\"  3x30\"  3 x 30\" 3x30\"        Wrist Ext S'  3x30\"  2x30\"  2 x 30\"  3x30\"        Wrist F S'    2x30\"  2 x 30\"  3x30\"                     Ther Activity                          UBE  4/4 4/4 4'/4' 4/4        Gait Training                                       Modalities                                              "

## 2025-06-06 ENCOUNTER — OFFICE VISIT (OUTPATIENT)
Dept: PHYSICAL THERAPY | Facility: REHABILITATION | Age: 50
End: 2025-06-06

## 2025-06-06 DIAGNOSIS — M77.01 MEDIAL EPICONDYLITIS OF RIGHT ELBOW: Primary | ICD-10-CM

## 2025-06-06 PROCEDURE — 97112 NEUROMUSCULAR REEDUCATION: CPT

## 2025-06-06 PROCEDURE — 97110 THERAPEUTIC EXERCISES: CPT

## 2025-06-06 NOTE — PROGRESS NOTES
Daily Note     Today's date: 2025  Patient name: Galdino Gaines  : 1975  MRN: [id number]  Referring provider: Radha Platt,*  Dx:   Encounter Diagnosis     ICD-10-CM    1. Medial epicondylitis of right elbow  M77.01           Start Time: 930  Stop Time: 1007  Total time in clinic (min): 37 minutes    Subjective: Presents to therapy noting his elbow is slowly getting better, stating he does not have any issues with any of the exercises he has been performing.      Objective: See treatment diary below      Assessment: Patient able to complete full program and all progressions without adverse response. Little to no cueing needed throughout secondary to consistent repetitions with HEP. Patient demonstrated fatigue post treatment, exhibited good technique with therapeutic exercises, and would benefit from continued PT.      Plan: Continue per plan of care.      Precautions: n/a     POC expires Unit limit Auth Expiration date PT/OT + Visit Limit?   2025 BOMN 2025 60         Visit/Unit Tracking  AUTH Status:  Date 2025 Used 1 2 3 4 5 6    Remaining             Pertinent Findings:      POC End Date: 2025                                                                                          Test / Measure     FOTO (Predicted 72) 63   Right Elbow Ext Strength  4/5 !   Right Shoulder Flexion Strength  3+/5 !   AROM Right Supination  Wnl !     Access Code: [email or web address]  URL: [email or web address]  Date: 2025  Prepared by: Kaleigh Adorno    Exercises  - Standing Single Arm Elbow Flexion with Resistance  - 2 x daily - 2 sets - 10 reps - 5 hold  - Standing Elbow Extension with Self-Anchored Resistance  - 2 x daily - 2 sets - 10 reps - 5 hold  - Isometric Shoulder Flexion at Wall  - 1 x daily - 10 reps - 5 hold  - Isometric Shoulder Abduction at Wall  - 1 x daily - 10 reps - 5 hold  - Isometric Shoulder External Rotation at Wall  - 1 x daily -  "10 reps - 5 hold  - Standing Isometric Shoulder Internal Rotation at Doorway  - 1 x daily - 10 reps - 5 hold      Manuals 5/6 5/9 5/13 5/30 6/4 6/6       PROM              IASTM                                       Neuro Re-Ed             Nerve Glides              Hammer Pro/Supination  20x ea  20x 2#  2# 3 x 10  2x10 ea red therabar 2x10 ea GFB       Gripper  2x10 5\" Blk 2x10 5\" Blk Blk 5\" 2 x 10  Blk 5\"2x10 Blk 5\"2x10       Wrist Ext Ecc  Red FB 2x10  Red FB 2x10 Red FB 2 x 10  Red FB 2x10 GFB  2x10       Shoulder Isometrics F/Abd/IR/ER   10x5\" ea  10x5\" ea GTB  GTB 5\" x 10                                    Ther Ex             Row  2x10 16#  2x12 16# 16# 3 x 10  16# 3x10 16#  3x10       Pull downs      16# 2x10 16#  2x10       Curl  2x10 8#  2x10 8#  8# 2 x 10  3x10 10# 3x10  10#       Tricep Extension   2x10 10#  2x10 10#  10# 2 x 10  2x10 15# 2x10  15#       Bicep S'  2x20\"  3x30\"  3 x 30\" 3x30\" 3x30''       Wrist Ext S'  3x30\"  2x30\"  2 x 30\"  3x30\" 3x30''       Wrist F S'    2x30\"  2 x 30\"  3x30\" 3x30''                    Ther Activity                          UBE  4/4 4/4 4'/4' 4/4 4/4       Gait Training                                       Modalities                                                "

## 2025-06-13 ENCOUNTER — OFFICE VISIT (OUTPATIENT)
Dept: PHYSICAL THERAPY | Facility: REHABILITATION | Age: 50
End: 2025-06-13

## 2025-06-13 DIAGNOSIS — M77.01 MEDIAL EPICONDYLITIS OF RIGHT ELBOW: Primary | ICD-10-CM

## 2025-06-13 PROCEDURE — 97110 THERAPEUTIC EXERCISES: CPT

## 2025-06-13 PROCEDURE — 97530 THERAPEUTIC ACTIVITIES: CPT

## 2025-06-13 PROCEDURE — 97112 NEUROMUSCULAR REEDUCATION: CPT

## 2025-06-13 NOTE — PROGRESS NOTES
Daily Note     Today's date: 2025  Patient name: Galdino Gaines  : 1975  MRN: [id number]  Referring provider: Radha Platt,*  Dx:   Encounter Diagnosis     ICD-10-CM    1. Medial epicondylitis of right elbow  M77.01             Start Time: 1425  Stop Time: 1503  Total time in clinic (min): 38 minutes    Subjective: Presents to therapy noting he hasn't been getting any pain unless he performs a pulling motion and is at end range.       Objective: See treatment diary below      Assessment: Patient able to complete full program and all progressions without adverse response. Little to no cueing needed throughout secondary to consistent repetitions with HEP. Patient demonstrated fatigue post treatment, exhibited good technique with therapeutic exercises, and would benefit from continued PT. 1:1 with Kaleigh Adorno DPT for entirety of session.         Plan: Continue per plan of care.      Precautions: n/a     POC expires Unit limit Auth Expiration date PT/OT + Visit Limit?   2025 BOMN 2025 60         Visit/Unit Tracking  AUTH Status:  Date 2025 Used 1 2 3 4 5 6 7    Remaining              Pertinent Findings:      POC End Date: 2025                                                                                          Test / Measure     FOTO (Predicted 72) 63   Right Elbow Ext Strength  4/5 !   Right Shoulder Flexion Strength  3+/5 !   AROM Right Supination  Wnl !     Access Code: [email or web address]  URL: [email or web address]  Date: 2025  Prepared by: Kaleigh Adorno    Exercises  - Standing Single Arm Elbow Flexion with Resistance  - 2 x daily - 2 sets - 10 reps - 5 hold  - Standing Elbow Extension with Self-Anchored Resistance  - 2 x daily - 2 sets - 10 reps - 5 hold  - Isometric Shoulder Flexion at Wall  - 1 x daily - 10 reps - 5 hold  - Isometric Shoulder Abduction at Wall  - 1 x daily - 10 reps - 5 hold  - Isometric Shoulder  "External Rotation at Wall  - 1 x daily - 10 reps - 5 hold  - Standing Isometric Shoulder Internal Rotation at Doorway  - 1 x daily - 10 reps - 5 hold      Manuals 5/6 5/9 5/13 5/30 6/4 6/6 6/13      PROM              IASTM                                       Neuro Re-Ed             Nerve Glides              Hammer Pro/Supination  20x ea  20x 2#  2# 3 x 10  2x10 ea red therabar 2x10 ea GFB 2x10 ea GFB      Gripper  2x10 5\" Blk 2x10 5\" Blk Blk 5\" 2 x 10  Blk 5\"2x10 Blk 5\" 2x10 Blk 20x5\"       Wrist Ext Ecc  Red FB 2x10  Red FB 2x10 Red FB 2 x 10  Red FB 2x10 GFB  2x10 GFB 3x10       Shoulder Isometrics F/Abd/IR/ER   10x5\" ea  10x5\" ea GTB  GTB 5\" x 10                                    Ther Ex             Row  2x10 16#  2x12 16# 16# 3 x 10  16# 3x10 16#  3x10 16#  3x10      Pull downs      16# 2x10 16#  2x10 16# 3x10       Curl  2x10 8#  2x10 8#  8# 2 x 10  3x10 10# 3x10  10# 3x10 10#      Tricep Extension   2x10 10#  2x10 10#  10# 2 x 10  2x10 15# 2x10  15# 3x10 10#       Bicep S'  2x20\"  3x30\"  3 x 30\" 3x30\" 3x30'' 3x30''      Wrist Ext S'  3x30\"  2x30\"  2 x 30\"  3x30\" 3x30'' 3x30''      Wrist F S'    2x30\"  2 x 30\"  3x30\" 3x30'' 3x30''                   Ther Activity                          UBE  4/4 4/4 4'/4' 4/4 4/4 4/4      Gait Training                                       Modalities                                                "

## 2025-06-17 ENCOUNTER — OFFICE VISIT (OUTPATIENT)
Dept: PHYSICAL THERAPY | Facility: REHABILITATION | Age: 50
End: 2025-06-17

## 2025-06-17 DIAGNOSIS — M77.01 MEDIAL EPICONDYLITIS OF RIGHT ELBOW: Primary | ICD-10-CM

## 2025-06-17 PROCEDURE — 97112 NEUROMUSCULAR REEDUCATION: CPT

## 2025-06-17 PROCEDURE — 97110 THERAPEUTIC EXERCISES: CPT

## 2025-06-17 PROCEDURE — 97530 THERAPEUTIC ACTIVITIES: CPT

## 2025-06-17 NOTE — PROGRESS NOTES
Daily Note     Today's date: 2025  Patient name: Galdino Gaines  : 1975  MRN: [id number]  Referring provider: Radha Platt,*  Dx:   Encounter Diagnosis     ICD-10-CM    1. Medial epicondylitis of right elbow  M77.01               Start Time: 1020  Stop Time: 1102  Total time in clinic (min): 42 minutes    Subjective: Presents to therapy noting he hasn't been getting any pain unless he performs a pulling motion and is at end range.       Objective: See treatment diary below      Assessment: Patient able to complete full program and all progressions without adverse response. Little to no cueing needed throughout secondary to consistent repetitions with HEP. Patient demonstrated fatigue post treatment, exhibited good technique with therapeutic exercises, and would benefit from continued PT. 1:1 with Kaleigh Adorno, DPT for 38 min and IEP for remainder.         Plan: Continue per plan of care.      Precautions: n/a     POC expires Unit limit Auth Expiration date PT/OT + Visit Limit?   2025 BOMN 2025 60         Visit/Unit Tracking  AUTH Status:  Date 2025 Used 1 2 3 4 5 6 7 8    Remaining               Pertinent Findings:      POC End Date: 2025                                                                                          Test / Measure     FOTO (Predicted 72) 63   Right Elbow Ext Strength  4/5 !   Right Shoulder Flexion Strength  3+/5 !   AROM Right Supination  Wnl !     Access Code: [email or web address]  URL: [email or web address]  Date: 2025  Prepared by: Kaleigh Adorno    Exercises  - Standing Single Arm Elbow Flexion with Resistance  - 2 x daily - 2 sets - 10 reps - 5 hold  - Standing Elbow Extension with Self-Anchored Resistance  - 2 x daily - 2 sets - 10 reps - 5 hold  - Isometric Shoulder Flexion at Wall  - 1 x daily - 10 reps - 5 hold  - Isometric Shoulder Abduction at Wall  - 1 x daily - 10 reps - 5 hold  -  "Isometric Shoulder External Rotation at Wall  - 1 x daily - 10 reps - 5 hold  - Standing Isometric Shoulder Internal Rotation at Doorway  - 1 x daily - 10 reps - 5 hold      Manuals 5/6 5/9 5/13 5/30 6/4 6/6 6/13 6/17     PROM              IASTM                                       Neuro Re-Ed             Nerve Glides              Hammer Pro/Supination  20x ea  20x 2#  2# 3 x 10  2x10 ea red therabar 2x10 ea GFB 2x10 ea GFB 2x10 ea GFB     Gripper  2x10 5\" Blk 2x10 5\" Blk Blk 5\" 2 x 10  Blk 5\"2x10 Blk 5\" 2x10 Blk 20x5\"  Blk 20x5\"      Wrist Ext Ecc  Red FB 2x10  Red FB 2x10 Red FB 2 x 10  Red FB 2x10 GFB  2x10 GFB 3x10  GFB 3x10      Shoulder Isometrics F/Abd/IR/ER   10x5\" ea  10x5\" ea GTB  GTB 5\" x 10                                    Ther Ex             Row  2x10 16#  2x12 16# 16# 3 x 10  16# 3x10 16#  3x10 16#  3x10 16# 3x10      Pull downs      16# 2x10 16#  2x10 16# 3x10  16# 3x10      Curl  2x10 8#  2x10 8#  8# 2 x 10  3x10 10# 3x10  10# 3x10 10# 3x10 10#      Tricep Extension   2x10 10#  2x10 10#  10# 2 x 10  2x10 15# 2x10  15# 3x10 10#  3x10 10#      Bicep S'  2x20\"  3x30\"  3 x 30\" 3x30\" 3x30'' 3x30'' 3x30''     Wrist Ext S'  3x30\"  2x30\"  2 x 30\"  3x30\" 3x30'' 3x30'' 3x30''     Wrist F S'    2x30\"  2 x 30\"  3x30\" 3x30'' 3x30'' 3x30''                  Ther Activity                          UBE  4/4 4/4 4'/4' 4/4 4/4 4/4 4/4     Gait Training                                       Modalities                                                "